# Patient Record
Sex: FEMALE | Race: BLACK OR AFRICAN AMERICAN | NOT HISPANIC OR LATINO | Employment: OTHER | URBAN - METROPOLITAN AREA
[De-identification: names, ages, dates, MRNs, and addresses within clinical notes are randomized per-mention and may not be internally consistent; named-entity substitution may affect disease eponyms.]

---

## 2017-01-03 ENCOUNTER — GENERIC CONVERSION - ENCOUNTER (OUTPATIENT)
Dept: OTHER | Facility: OTHER | Age: 67
End: 2017-01-03

## 2017-01-20 ENCOUNTER — ALLSCRIPTS OFFICE VISIT (OUTPATIENT)
Dept: OTHER | Facility: OTHER | Age: 67
End: 2017-01-20

## 2017-04-14 ENCOUNTER — ALLSCRIPTS OFFICE VISIT (OUTPATIENT)
Dept: OTHER | Facility: OTHER | Age: 67
End: 2017-04-14

## 2017-05-17 ENCOUNTER — ALLSCRIPTS OFFICE VISIT (OUTPATIENT)
Dept: OTHER | Facility: OTHER | Age: 67
End: 2017-05-17

## 2017-05-17 LAB
BILIRUB UR QL STRIP: NORMAL
CLARITY UR: NORMAL
COLOR UR: YELLOW
GLUCOSE (HISTORICAL): NORMAL
HGB UR QL STRIP.AUTO: NORMAL
KETONES UR STRIP-MCNC: NORMAL MG/DL
LEUKOCYTE ESTERASE UR QL STRIP: NORMAL
NITRITE UR QL STRIP: NORMAL
PH UR STRIP.AUTO: 7 [PH]
PROT UR STRIP-MCNC: NORMAL MG/DL
SP GR UR STRIP.AUTO: 1.01
UROBILINOGEN UR QL STRIP.AUTO: 0

## 2017-05-30 ENCOUNTER — ALLSCRIPTS OFFICE VISIT (OUTPATIENT)
Dept: OTHER | Facility: OTHER | Age: 67
End: 2017-05-30

## 2017-05-30 DIAGNOSIS — Z00.00 ENCOUNTER FOR GENERAL ADULT MEDICAL EXAMINATION WITHOUT ABNORMAL FINDINGS: ICD-10-CM

## 2017-05-30 DIAGNOSIS — M41.9 SCOLIOSIS: ICD-10-CM

## 2017-06-06 ENCOUNTER — APPOINTMENT (OUTPATIENT)
Dept: PHYSICAL THERAPY | Facility: CLINIC | Age: 67
End: 2017-06-06
Payer: MEDICARE

## 2017-06-06 ENCOUNTER — GENERIC CONVERSION - ENCOUNTER (OUTPATIENT)
Dept: OTHER | Facility: OTHER | Age: 67
End: 2017-06-06

## 2017-06-06 PROCEDURE — G8982 BODY POS GOAL STATUS: HCPCS

## 2017-06-06 PROCEDURE — 97110 THERAPEUTIC EXERCISES: CPT

## 2017-06-06 PROCEDURE — 97161 PT EVAL LOW COMPLEX 20 MIN: CPT

## 2017-06-06 PROCEDURE — G8981 BODY POS CURRENT STATUS: HCPCS

## 2017-06-09 ENCOUNTER — ALLSCRIPTS OFFICE VISIT (OUTPATIENT)
Dept: OTHER | Facility: OTHER | Age: 67
End: 2017-06-09

## 2017-06-14 ENCOUNTER — GENERIC CONVERSION - ENCOUNTER (OUTPATIENT)
Dept: OTHER | Facility: OTHER | Age: 67
End: 2017-06-14

## 2017-06-15 ENCOUNTER — APPOINTMENT (OUTPATIENT)
Dept: PHYSICAL THERAPY | Facility: CLINIC | Age: 67
End: 2017-06-15
Payer: MEDICARE

## 2017-06-15 PROCEDURE — 97110 THERAPEUTIC EXERCISES: CPT

## 2017-06-19 ENCOUNTER — GENERIC CONVERSION - ENCOUNTER (OUTPATIENT)
Dept: OTHER | Facility: OTHER | Age: 67
End: 2017-06-19

## 2017-06-22 ENCOUNTER — APPOINTMENT (OUTPATIENT)
Dept: PHYSICAL THERAPY | Facility: CLINIC | Age: 67
End: 2017-06-22
Payer: MEDICARE

## 2017-06-22 PROCEDURE — 97110 THERAPEUTIC EXERCISES: CPT

## 2017-06-28 ENCOUNTER — APPOINTMENT (OUTPATIENT)
Dept: PHYSICAL THERAPY | Facility: CLINIC | Age: 67
End: 2017-06-28
Payer: MEDICARE

## 2017-06-28 PROCEDURE — 97110 THERAPEUTIC EXERCISES: CPT

## 2017-06-30 ENCOUNTER — ALLSCRIPTS OFFICE VISIT (OUTPATIENT)
Dept: OTHER | Facility: OTHER | Age: 67
End: 2017-06-30

## 2017-07-06 ENCOUNTER — APPOINTMENT (OUTPATIENT)
Dept: PHYSICAL THERAPY | Facility: CLINIC | Age: 67
End: 2017-07-06
Payer: MEDICARE

## 2017-07-06 PROCEDURE — 97110 THERAPEUTIC EXERCISES: CPT

## 2017-07-06 PROCEDURE — G8983 BODY POS D/C STATUS: HCPCS

## 2017-07-06 PROCEDURE — G8982 BODY POS GOAL STATUS: HCPCS

## 2017-07-10 ENCOUNTER — ALLSCRIPTS OFFICE VISIT (OUTPATIENT)
Dept: OTHER | Facility: OTHER | Age: 67
End: 2017-07-10

## 2017-07-12 ENCOUNTER — GENERIC CONVERSION - ENCOUNTER (OUTPATIENT)
Dept: OTHER | Facility: OTHER | Age: 67
End: 2017-07-12

## 2017-07-13 ENCOUNTER — APPOINTMENT (OUTPATIENT)
Dept: PHYSICAL THERAPY | Facility: CLINIC | Age: 67
End: 2017-07-13
Payer: MEDICARE

## 2017-07-14 ENCOUNTER — TRANSCRIBE ORDERS (OUTPATIENT)
Dept: LAB | Facility: CLINIC | Age: 67
End: 2017-07-14

## 2017-07-14 ENCOUNTER — APPOINTMENT (OUTPATIENT)
Dept: LAB | Facility: CLINIC | Age: 67
End: 2017-07-14
Payer: MEDICARE

## 2017-07-14 DIAGNOSIS — Z00.00 ENCOUNTER FOR GENERAL ADULT MEDICAL EXAMINATION WITHOUT ABNORMAL FINDINGS: ICD-10-CM

## 2017-07-14 LAB
BASOPHILS # BLD AUTO: 0.06 THOUSANDS/ΜL (ref 0–0.1)
BASOPHILS NFR BLD AUTO: 2 % (ref 0–1)
EOSINOPHIL # BLD AUTO: 0 THOUSAND/ΜL (ref 0–0.61)
EOSINOPHIL NFR BLD AUTO: 0 % (ref 0–6)
ERYTHROCYTE [DISTWIDTH] IN BLOOD BY AUTOMATED COUNT: 13.5 % (ref 11.6–15.1)
HCT VFR BLD AUTO: 37.5 % (ref 34.8–46.1)
HGB BLD-MCNC: 12 G/DL (ref 11.5–15.4)
LYMPHOCYTES # BLD AUTO: 1.18 THOUSANDS/ΜL (ref 0.6–4.47)
LYMPHOCYTES NFR BLD AUTO: 29 % (ref 14–44)
MCH RBC QN AUTO: 30 PG (ref 26.8–34.3)
MCHC RBC AUTO-ENTMCNC: 32 G/DL (ref 31.4–37.4)
MCV RBC AUTO: 94 FL (ref 82–98)
MONOCYTES # BLD AUTO: 0.55 THOUSAND/ΜL (ref 0.17–1.22)
MONOCYTES NFR BLD AUTO: 14 % (ref 4–12)
NEUTROPHILS # BLD AUTO: 2.27 THOUSANDS/ΜL (ref 1.85–7.62)
NEUTS SEG NFR BLD AUTO: 55 % (ref 43–75)
NRBC BLD AUTO-RTO: 0 /100 WBCS
PLATELET # BLD AUTO: 217 THOUSANDS/UL (ref 149–390)
PMV BLD AUTO: 11.6 FL (ref 8.9–12.7)
RBC # BLD AUTO: 4 MILLION/UL (ref 3.81–5.12)
WBC # BLD AUTO: 4.06 THOUSAND/UL (ref 4.31–10.16)

## 2017-07-14 PROCEDURE — 36415 COLL VENOUS BLD VENIPUNCTURE: CPT

## 2017-07-14 PROCEDURE — 85025 COMPLETE CBC W/AUTO DIFF WBC: CPT

## 2017-07-19 ENCOUNTER — GENERIC CONVERSION - ENCOUNTER (OUTPATIENT)
Dept: OTHER | Facility: OTHER | Age: 67
End: 2017-07-19

## 2017-07-25 ENCOUNTER — ALLSCRIPTS OFFICE VISIT (OUTPATIENT)
Dept: OTHER | Facility: OTHER | Age: 67
End: 2017-07-25

## 2017-08-09 ENCOUNTER — APPOINTMENT (OUTPATIENT)
Dept: AUDIOLOGY | Facility: CLINIC | Age: 67
End: 2017-08-09
Payer: MEDICARE

## 2017-08-09 PROCEDURE — 92557 COMPREHENSIVE HEARING TEST: CPT | Performed by: AUDIOLOGIST

## 2017-08-09 PROCEDURE — 92567 TYMPANOMETRY: CPT | Performed by: AUDIOLOGIST

## 2017-08-16 DIAGNOSIS — H91.90 HEARING LOSS: ICD-10-CM

## 2017-09-25 ENCOUNTER — TRANSCRIBE ORDERS (OUTPATIENT)
Dept: LAB | Facility: CLINIC | Age: 67
End: 2017-09-25

## 2017-09-25 ENCOUNTER — APPOINTMENT (OUTPATIENT)
Dept: LAB | Facility: CLINIC | Age: 67
End: 2017-09-25
Payer: MEDICARE

## 2017-09-25 DIAGNOSIS — Z00.00 ROUTINE GENERAL MEDICAL EXAMINATION AT A HEALTH CARE FACILITY: Primary | ICD-10-CM

## 2017-09-25 DIAGNOSIS — Z00.00 ROUTINE GENERAL MEDICAL EXAMINATION AT A HEALTH CARE FACILITY: ICD-10-CM

## 2017-09-25 LAB
BASOPHILS # BLD AUTO: 0.02 THOUSANDS/ΜL (ref 0–0.1)
BASOPHILS NFR BLD AUTO: 1 % (ref 0–1)
EOSINOPHIL # BLD AUTO: 0 THOUSAND/ΜL (ref 0–0.61)
EOSINOPHIL NFR BLD AUTO: 0 % (ref 0–6)
ERYTHROCYTE [DISTWIDTH] IN BLOOD BY AUTOMATED COUNT: 13.6 % (ref 11.6–15.1)
HCT VFR BLD AUTO: 37.4 % (ref 34.8–46.1)
HGB BLD-MCNC: 11.8 G/DL (ref 11.5–15.4)
LYMPHOCYTES # BLD AUTO: 1.03 THOUSANDS/ΜL (ref 0.6–4.47)
LYMPHOCYTES NFR BLD AUTO: 33 % (ref 14–44)
MCH RBC QN AUTO: 29.3 PG (ref 26.8–34.3)
MCHC RBC AUTO-ENTMCNC: 31.6 G/DL (ref 31.4–37.4)
MCV RBC AUTO: 93 FL (ref 82–98)
MONOCYTES # BLD AUTO: 0.39 THOUSAND/ΜL (ref 0.17–1.22)
MONOCYTES NFR BLD AUTO: 13 % (ref 4–12)
NEUTROPHILS # BLD AUTO: 1.67 THOUSANDS/ΜL (ref 1.85–7.62)
NEUTS SEG NFR BLD AUTO: 53 % (ref 43–75)
NRBC BLD AUTO-RTO: 0 /100 WBCS
PLATELET # BLD AUTO: 228 THOUSANDS/UL (ref 149–390)
PMV BLD AUTO: 11.6 FL (ref 8.9–12.7)
RBC # BLD AUTO: 4.03 MILLION/UL (ref 3.81–5.12)
WBC # BLD AUTO: 3.11 THOUSAND/UL (ref 4.31–10.16)

## 2017-09-25 PROCEDURE — 85025 COMPLETE CBC W/AUTO DIFF WBC: CPT

## 2017-09-25 PROCEDURE — 36415 COLL VENOUS BLD VENIPUNCTURE: CPT

## 2017-10-03 ENCOUNTER — ALLSCRIPTS OFFICE VISIT (OUTPATIENT)
Dept: OTHER | Facility: OTHER | Age: 67
End: 2017-10-03

## 2017-10-27 NOTE — PROGRESS NOTES
Assessment  1  Callus (700) (L84)   2  Atherosclerosis of arteries of extremities (440 20) (I70 209)   3  Deformity of ankle and foot, acquired (736 70) (M21 969)   4  Pain in both feet (729 5) (M79 671,M79 672)    Plan   · Follow-up visit in 9 weeks Evaluation and Treatment  Follow-up  Status: Hold For -  Scheduling  Requested for: 77IOF1041   · Inspect your feet and legs daily if you have vascular disease ; Status:Complete;   Done:  45HGY7019 02:11PM          Follow-up visit in 3 months Evaluation and Treatment  Follow-up  Status: Hold For - Scheduling  Requested for: 06Ixw2335  Ordered; For: Pain in both feet;  Ordered By: Jewel Rose  Performed:   Due: 30BHI2716     Discussion/Summary  The patient, patient's caretaker was counseled regarding instructions for management,-- risk factor reductions,-- patient and family education,-- importance of compliance with treatment  The treatment plan was reviewed with the patient/guardian  The patient/guardian understands and agrees with the treatment plan      Chief Complaint  Patient has painful Calluses and thick painful nails that hurt when walking and wearing shoes  History of Present Illness  HPI: aids have not noticed any redness or signs of infection      Active Problems  1  Acid reflux (530 81) (K21 9)   2  Aftercare following joint replacement surgery (V54 81) (Z47 1)   3  Arthritis (716 90) (M19 90)   4  Atherosclerosis of arteries of extremities (440 20) (I70 209)   5  BMI 32 0-32 9,adult (V85 32) (Z68 32)   6  Callus (700) (L84)   7  Cataract (366 9) (H26 9)   8  Deformity of ankle and foot, acquired (736 70) (M21 969)   9  Depression screen (V79 0) (Z13 89)   10  Encounter for health maintenance examination with abnormal findings (V70 0) (Z00 01)   11  Flank pain (789 09) (R10 9)   12  Hammer toes of both feet (735 4) (M20 41,M20 42)   13  Hearing loss (389 9) (H91 90)   14  Ingrowing nail (703 0) (L60 0)   15   Lactose intolerance (271 3) (E73 9) 16  Need for Tdap vaccination (V06 1) (Z23)   17  Osteoarthritis, knee/lower leg (715 96) (M17 9)   18  Osteoporosis (733 00) (M81 0)   19  Pain in both feet (729 5) (M79 671,M79 672)   20  Pain in extremity (729 5) (M79 609)   21  Rheumatoid arthritis (714 0) (M06 9)   22  Routine eye exam (V72 0) (Z01 00)   23  Screening for genitourinary condition (V81 6) (Z13 89)   24  Seizure, epileptic (345 90) (G40 909)   25  Severe scoliosis (737 30) (M41 9)   26  Special screening for other neurological conditions (V80 09) (Z13 89)   27  Systemic lupus erythematosus (710 0) (M32 9)    Past Medical History   · _ (795 0)   · Acute upper respiratory infection (465 9) (J06 9)   · History of Cellulitis of toe of right foot (681 10) (L03 031)   · History of Dermatophytosis of nail (110 1) (B35 1)   · History of Fall, accidental (E888 9) (M15 CJXE)   · History of acute gastritis (V12 70) (Z87 19)   · History of allergic rhinitis (V12 69) (Z87 09)   · History of disuse osteoporosis (V13 59) (Z87 39)   · History of hypertension (V12 59) (Z86 79)   · History of ingrown nail (V13 3) (Z87 2)   · History of viral infection (V12 09) (Z86 19)   · History of Maceration of skin (709 8) (L98 8)   · History of Middle ear effusion (381 4) (H65 90)   · History of Paronychia of toe (681 11) (L03 039)   · History of Rib pain on left side (786 50) (R07 81)   · Screening examination for pulmonary tuberculosis (V74 1) (Z11 1)   · Skin Abscess Of Hip (682 6)    The active problems and past medical history were reviewed and updated today  Surgical History   · History of Knee Replacement   · History of Revision Of Total Knee Arthroplasty    The surgical history was reviewed and updated today         Family History  Mother    · Family history of Alzheimer's disease (V17 2) (Z82 0)   · Family history of arthritis (V17 7) (Z82 61)   · Family history of malignant neoplasm of breast (V16 3) (Z80 3)   · FH: mastectomy (V19 8) (Z80 80)  Father    · Family history of diabetes mellitus (V18 0) (Z83 3)    Social History   · Lives with roommate   · Never A Smoker   · No alcohol use  The social history was reviewed and updated today  Current Meds   1  B-6 50 MG Oral Tablet; take 1 tab twice daily, once at 8 am and once at 8pm;   Therapy: 35XFY9101 to (Last Rx:97Qnw5512)  Requested for: 77DRW1980 Ordered   2  Calcitonin ([place]) 200 UNIT/ACT Nasal Solution; ONE SPRAY TO ALTERNATING   NOSTIL @ 8PM  DR ETIENNE; Therapy: 06Vqc6409 to (Evaluate:78Clu8968)  Requested for: 10XMK6563; Last   Rx:09Jun2017 Ordered   3  Calcium 600 MG Oral Tablet; take 1 tab daily at 8am and one tab at 8pm  Requested for:   43Cls3325; Last Rx:11Gsn6517 Ordered   4  Calcium Carbonate 600 MG Oral Tablet; TAKE ONE TABLET @ 8AM AND @ [address]; Therapy: 66HEX3179 to (Evaluate:77Qim6729)  Requested for: 58JEM6753; Last   Rx:65Rsd6117 Ordered   5  Claritin 10 MG Oral Tablet; take 1 tab daily at 8pm;   Therapy: 26AUF9341 to (Evaluate:85Ivr3192)  Requested for: 19ROE9034; Last   WS:08RNP3626 Ordered   6  Clindamycin HCl - 300 MG Oral Capsule; take 2 caps prior to dental procedures; Therapy: 20FKS9238 to (Last Rx:34Hiy9429)  Requested for: 27Udc1797 Ordered   7  Daily Multi Oral Tablet; Take 1 tab daily at 8AM; Last Rx:09Jun2017 Ordered   8  Ferocon Oral Capsule; TAKE ONE CAPSULE @ 8AM DROPPERFULMICAH BABB; Therapy: 30JGZ9625 to (77 769 135)  Requested for: 18HNU9264; Last   Rx:33Hyn1974 Ordered   9  Folic Acid 1 MG Oral Tablet; TAKE ONE TABLET @ 8AM DROPPERFULL  ETIENNE; Therapy: 39NFD5628 to (Evaluate:16Oct2016)  Requested for: 03Pbl6102; Last   Rx:50Bev1168 Ordered   10  Fosamax 70 MG Oral Tablet; take 1 tab by mouth once a week on sunday mornings at    7am; Last Rx:85Ujp2495 Ordered   11  Furosemide 20 MG Oral Tablet; TAKE ONE TABLET @ 8AM DR ETIENNE; Therapy: 52Omg3604 to (Evaluate:40Crr9724)  Requested for: 81ITP2729; Last    AR:90FFQ1022 Ordered   12   Lactaid 3000 UNIT Oral Tablet; Take 2 tabs with 1st bite of dairy as needed  take 1 more    tab if dairy consumption continues 30-45 mins after 1st dosage; Therapy: 48CMR6878 to (Evaluate:84Nhf8500); Last Rx:09Jun2017 Ordered   13  Lactase Enzyme 3000 UNIT Oral Tablet; TAKE 2 CAPLETS WITH 1ST BITE OF DAIRY  TAKE 1 CAPLET IF DAIRY CONSUMPTION CONTINUES 30-45 MINUTES AFTER 1ST    DOSAGE;    Therapy: 43Qet7459 to (Evaluate:20Apr2017)  Requested for: 70OZU9207; Last    Rx:79Zhg6338 Ordered   14  LevETIRAcetam 500 MG Oral Tablet; 1 Two Times A Day; 7 AM & 8 PM;    Therapy: 91KHA2882 to (Evaluate:15Nov2016)  Requested for: 31IEO2811; Last    Rx:49Oxx2763 Ordered   15  Pataday 0 2 % Ophthalmic Solution; Therapy: 32XBI2739 to Recorded   16  PHENobarbital 64 8 MG Oral Tablet; Therapy: (Recorded:62Xfr7981) to Recorded   17  Plaquenil 200 MG Oral Tablet; Therapy: (Recorded:66Ozc7703) to Recorded   18  PredniSONE 5 MG Oral Tablet; TAKE 1 TABLET DAILY; Therapy: (Recorded:35Nvm6117) to Recorded   19  RaNITidine HCl - 150 MG Oral Tablet; TAKE ONE TABLET @ 8AM AND @ 8PM  DR ETIENNE; Therapy: 45MVN2185 to (Evaluate:24Ebc8974)  Requested for: 76ISU6941; Last    NH:96BTG7736 Ordered   20  Tab-A-Mike Oral Tablet; TAKE ONE TABLET @ 8AM  DR Kirsten Navas; Therapy: 11Aug2016 to (Evaluate:30Jan2018)  Requested for: 69Lnb8454; Last    Rx:68Nik8557 Ordered   21  Tylenol 325 MG Oral Tablet; TAKE 1 TO 2 TABLETS EVERY 4 HOURS AS NEEDED; Therapy: 20KUU4550 to (Evaluate:03Jun2017); Last Rx:75Uab3925 Ordered   22  Vitamin B-6 50 MG Oral Tablet; TAKE ONE TABLET @ 8AM AND @ 8PM  DR BABB; Therapy: 46Yuw9937 to (21 )  Requested for: 23Vwt5422; Last    Rx:81Tjv4078 Ordered   23  Vitamin D (Cholecalciferol) 400 UNIT Oral Tablet; TAKE 2 TABLETS @ [facility]; Therapy: 27WKL1655 to (Evaluate:04Jun2018)  Requested for: 35HRL0530; Last    Rx:09Jun2017 Ordered    The medication list was reviewed and updated today  Allergies  1  Erythromycin TABS   2  Penicillins   3  Relafen TABS   4  Erythromycin TABS   5  Penicillins   6  Relafen TABS  7  Dairy   8  No Known Latex Allergies   9  Seasonal    Vitals   Recorded: 14NYM4280 56:55LE   Systolic 972   Diastolic 82   Height 5 ft 3 in   Weight 181 lb    BMI Calculated 32 06   BSA Calculated 1 85     Physical Exam    Constitutional: no acute distress, well appearing and well nourished  Pulmonary: no increased work of breathing or signs of respiratory distress  Cardiovascular: abnormal dorsalis pedis pulse,-- abnormal posterior tibialis pulse,-- elevation pallor,-- dependence rubor,-- abnormal capillary refill-- and-- edema  Orthopedic/Biomechanical: abnormal foot type,-- hammertoe(s),-- bunion(s),-- abnormal MPJ ROM,-- abnormal midtarsal joint ROM,-- abnormal subtalar joint ROM,-- decreased strength with dorsiflexion,-- discolored nails,-- ingrown nails-- and-- subungual debris, but-- normal strength  Skin: keratoses  Left Foot: Appearance: Normal except as noted: a deformity-- and-- pes planus  Great toe deformities include a bunion  Second toe deformities include hammer toe  Third toe deformities include hammer toe  Forth toe deformities include hammer toe  Fifth toe deformities include hammer toe  Evaluation of the great toe nail demonstrates paronychia-- and-- an ingrown nail  Negative erythema negative signs of infection  Hyperkeratotic lesions first digit in first MPJ bilateral large hyperkeratotic lesion distal right third digit  Special Tests: painful hyperkeratotic lesion on the distal aspect of right third digit secondary to contracted hammertoes patient has rigidly contracted hammertoes 2 through 5 bilateral    Right Foot: Appearance: Normal except as noted: a deformity-- and-- pes planus  Great toe deformities include a bunion  Second toe deformities include hammer toe  Third toe deformities include hammer toe  Forth toe deformities include hammer toe   Fifth toe deformities include hammer toe  Evaluation of the great toe nail demonstrates no paronychia-- and-- no ingrown nail  No signs of ingrown erythema no exudate  Pre-ulcerative callus first and fifth met heads rigid hammertoes 2 through 5 bilateral moderate hallux valgus bilateral decreased range of motion first MPJ  Painful hyperkeratotic lesion on the distal aspect of the right third toe  There is blood beneath the skin there is no exudate or signs of infection  Special Tests: mild hyperpigmentation lower legs bilateral mild edema  Neurological Exam: performed  Light touch was decreased bilaterally  Vibratory sensation was decreased in both ankles  Response to monofilament test was 4/6 sites missed bilaterally  Deep tendon reflexes: achilles reflex absent bilateraly  Vascular Exam: Dorsalis pedis pulses were absent bilaterally  Posterior tibial pulses were absent bilaterally  Elevation Pallor: present bilaterally  Dependence rubor was present bilaterally  Capillary refill time was greater than 3 seconds bilaterally  Edema: moderate bilaterally  Toenails: All of the toenails were elongated,-- hypertrophied,-- discolored,-- shown to have subungual debris-- and-- tender  Hyperkeratosis: present on both first toes,-- present on the right third toe,-- present on both first sub metatarsals-- and-- skin hairless atrophic  Shoe Gear Evaluation: performed ()  Right Foot: width: d-- and-- length: 9  Left Foot: width: d-- and-- length: 9  Recommendation(s): athletic shoes-- and-- SAS style  Procedure      Procedure: Debridement of hyperkeratosis  The procedure's risks and infection risk were discussed with the patient and with the guardian  Procedure Note:  Anesthesia:   The area for debridement was located on the First metatarsal head, fifth metatarsal head, bilateral  Debridement was performed on partial thickness  Post-Procedure:   Aseptic debridement of pre-trophic hyperkeratotic lesions ? 4 plantar feet bilateral  Debridement with #10 scalpel debrided nonviable tissue down to healthy viable tissue  No complicationsdebridement and planing of nails x10, manually, and mechanically       Future Appointments    Date/Time Provider Specialty Site   10/24/2017 02:45 PM Evonne Duran DPM Podiat[facility] [address] [facility]   12/19/2017 03:15 PM Evonne Duran DPM Podiatry [address] [facility]     Signatures   Electronically signed by :  Colleen Hou DPM; Oct  3 2017  2:15PM EST                       (Author)

## 2017-11-14 ENCOUNTER — TRANSCRIBE ORDERS (OUTPATIENT)
Dept: ADMINISTRATIVE | Facility: HOSPITAL | Age: 67
End: 2017-11-14

## 2017-11-14 DIAGNOSIS — M85.80 OSTEOPENIA, UNSPECIFIED LOCATION: ICD-10-CM

## 2017-11-14 DIAGNOSIS — Z12.31 VISIT FOR SCREENING MAMMOGRAM: Primary | ICD-10-CM

## 2017-11-15 ENCOUNTER — HOSPITAL ENCOUNTER (OUTPATIENT)
Dept: RADIOLOGY | Facility: CLINIC | Age: 67
Discharge: HOME | End: 2017-11-15
Payer: MEDICARE

## 2017-11-15 DIAGNOSIS — Z12.31 VISIT FOR SCREENING MAMMOGRAM: ICD-10-CM

## 2017-11-15 PROCEDURE — G0202 SCR MAMMO BI INCL CAD: HCPCS

## 2017-12-05 ENCOUNTER — ALLSCRIPTS OFFICE VISIT (OUTPATIENT)
Dept: OTHER | Facility: OTHER | Age: 67
End: 2017-12-05

## 2017-12-05 ENCOUNTER — TRANSCRIBE ORDERS (OUTPATIENT)
Dept: ADMINISTRATIVE | Facility: HOSPITAL | Age: 67
End: 2017-12-05

## 2017-12-05 DIAGNOSIS — R07.2 PRECORDIAL PAIN: Primary | ICD-10-CM

## 2017-12-05 DIAGNOSIS — M32.9 SYSTEMIC LUPUS ERYTHEMATOSUS, UNSPECIFIED SLE TYPE, UNSPECIFIED ORGAN INVOLVEMENT STATUS (HCC): ICD-10-CM

## 2017-12-05 DIAGNOSIS — R06.02 SHORTNESS OF BREATH: ICD-10-CM

## 2017-12-05 DIAGNOSIS — R07.2 PRECORDIAL PAIN: ICD-10-CM

## 2017-12-05 DIAGNOSIS — R94.31 NONSPECIFIC ABNORMAL ELECTROCARDIOGRAM (ECG) (EKG): Primary | ICD-10-CM

## 2017-12-05 DIAGNOSIS — I70.209 ATHEROSCLEROSIS OF NATIVE ARTERY OF EXTREMITY (HCC): ICD-10-CM

## 2017-12-07 NOTE — PROGRESS NOTES
Assessment  1  Systemic lupus erythematosus (710 0) (M32 9)  2  Severe scoliosis (737 30) (M41 9)  3  Abnormal ECG (794 31) (R94 31)  4  Chest pain, precordial (786 51) (R07 2)  5  Exertional shortness of breath (786 05) (R06 02)  6  Atherosclerosis of arteries of extremities (440 20) (I70 209)    Plan     · * NM MYOCARDIAL PERFUSION SPECT (STRESS AND/OR REST); Status:NeedInformation - Financial Authorization; Requested for:[id number];   Perform:[facility]; [phone]; Last Updated By:Regla Nicholas; 12/5/2017 1:46:51 PM;Ordered; For:Abnormal ECG, Chest pain, precordial; Ordered By:Cristobal Morgan;     · (1) LIPID PANEL FASTING W DIRECT LDL REFLEX; Status:Active; Requestedfor:78Aeu0046;   Perform:[facility]; Due:33Oyf4218; Ordered; For:Atherosclerosis of arteries of extremities; Ordered By:Cristobal Morgan;   · (1) LIPOPROTEIN (a); Status:Active; Requested for:42Hds9054;   Perform:[facility]; Due:11Pdp4120; Ordered; For:Atherosclerosis of arteries of extremities; Ordered By:Cristobal Morgan;   · EKG/ECG- POC; Status:Complete;   Done: 67CLL6253  Perform: In Office; [phone]; Last Updated By:Any Del Toro; 12/5/2017 1:13:39 PM;Ordered; For:Atherosclerosis of arteries of extremities; Ordered By:Cristobal Morgan;     · ECHO COMPLETE WITH CONTRAST IF INDICATED; Status:Need Information - FinancialAuthorization; Requested for:[id number];   Perform:[facility]; [phone]; Last Updated By:Regla Nicholas; 12/5/2017 1:46:51 PM;Ordered; For:Chest pain, precordial, Exertional shortness of breath, Systemic lupus erythematosus; Ordered By:Cristobal Morgan;    Discussion/Summary    Exertional chest tightness-intermediate [place] risk factorsnuclear stress test  Unable to walk secondary to severe scoliosislipid panel  shortness of breath-ECG with incomplete right bundle branch block/possible left atrial enlargement? Rule out structural abnormality elevated filling pressures   pulmonary htn from sle?  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  She has been having more shortness of breath with exertion and tightness in chest  Previously she could walk without having to stop  She is a poor historian and caretaker helping with history  Has never passed out before  Some depedent edema in the past  She a neurologist for seizures  [facility]      Review of Systems     Cardiac: chest pain-- and-- palpitations present , but-- No complaints of chest pain, no palpitations, no fainting  Skin: No complaints of nonhealing sores or skin rash  Genitourinary: No complaints of recurrent urinary tract infections, frequent urination at night, difficult urination, blood in urine, kidney stones, loss of bladder control, kidney problems, denies any birth control or hormone replacement, is not post menopausal, not currently pregnant  Psychological: No complaints of feeling depressed, anxiety, panic attacks, or difficulty concentrating  General: No complaints of trouble sleeping, lack of energy, fatigue, appetite changes, weight changes, fever, frequent infections, or night sweats  Respiratory: shortness of breath, but-- as noted in HPI  HEENT: No complaints of serious problems, hearing problems, nose problems, throat problems, or snoring  Gastrointestinal: No complaints of liver problems, nausea, vomiting, heartburn, constipation, bloody stools, diarrhea, problems swallowing, adbominal pain, or rectal bleeding    Hematologic: No complaints of bleeding disorders, anemia, blood clots, or excessive brusing  Neurological: No complaints of numbness, tingling, dizziness, weakness, seizures, headaches, syncope or fainting, AM fatigue, daytime sleepiness, no witnessed apnea episodes  Musculoskeletal: No complaints of arthritis, back pain, or painfull swelling  Active Problems  1  Acid reflux (530 81) (K21 9)  2  Aftercare following joint replacement surgery (V54 81) (Z47 1)  3  Arthritis (716 90) (M19 90)  4  Atherosclerosis of arteries of extremities (440 20) (I70 209)  5  BMI 32 0-32 9,adult (V85 32) (Z68 32)  6  Callus (700) (L84)  7  Cataract (366 9) (H26 9)  8  Deformity of ankle and foot, acquired (736 70) (M21 969)  9  Depression screen (V79 0) (Z13 89)  10  Encounter for health maintenance examination with abnormal findings (V70 0) (Z00 01)  11  Flank pain (789 09) (R10 9)  12  Hammer toes of both feet (735 4) (M20 41,M20 42)  13  Hearing loss (389 9) (H91 90)  14  Ingrowing nail (703 0) (L60 0)  15  Lactose intolerance (271 3) (E73 9)  16  Need for Tdap vaccination (V06 1) (Z23)  17  Osteoarthritis, knee/lower leg (715 96) (M17 9)  18  Osteoporosis (733 00) (M81 0)  19  Pain in both feet (729 5) (M79 671,M79 672)  20  Pain in extremity (729 5) (M79 609)  21  Rheumatoid arthritis (714 0) (M06 9)  22  Routine eye exam (V72 0) (Z01 00)  23  Screening for genitourinary condition (V81 6) (Z13 89)  24  Seizure, epileptic (345 90) (G40 909)  25  Severe scoliosis (737 30) (M41 9)  26  Special screening for other neurological conditions (V80 09) (Z13 89)  27   Systemic lupus erythematosus (710 0) (M32 9)    Past Medical History   · _ (795 0)   · Acute upper respiratory infection (465 9) (J06 9)   · History of Cellulitis of toe of right foot (681 10) (L03 031)   · History of Dermatophytosis of nail (110 1) (B35 1)   · History of Fall, accidental (K599 6) (D24 GFKE)   · History of acute gastritis (V12 70) (Z87 19)   · History of allergic rhinitis (V12 69) (Z87 09)   · History of disuse osteoporosis (V13 59) (Z87 39)   · History of hypertension (V12 59) (Z86 79)   · History of ingrown nail (V13 3) (Z87 2)   · History of viral infection (V12 09) (Z86 19)   · History of Maceration of skin (709 8) (L98 8)   · History of Middle ear effusion (381 4) (H65 90)   · History of Paronychia of toe (681 11) (L03 039)   · History of Rib pain on left side (786 50) (R07 81)   · Screening examination for pulmonary tuberculosis (V74 1) (Z11 1)   · Skin Abscess Of Hip (682 6)    The active problems and past medical history were reviewed and updated today  Surgical History   · History of Knee Replacement   · History of Revision Of Total Knee Arthroplasty    The surgical history was reviewed and updated today  Family History  Mother    · Family history of Alzheimer's disease (V17 2) (Z82 0)   · Family history of arthritis (V17 7) (Z82 61)   · Family history of malignant neoplasm of breast (V16 3) (Z80 3)   · FH: mastectomy (V19 8) (Z80 80)  Father    · Family history of diabetes mellitus (V18 0) (Z83 3)    The family history was reviewed and updated today  Social History     · Lives with roommate   · Never A Smoker   · No alcohol use  The social history was reviewed and updated today  Current Meds  1  B-6 50 MG Oral Tablet; take 1 tab twice daily, once at 8 am and once at 8pm; Therapy: 20ZUH3298 to (Last Rx:29Sep2017)  Requested for: 70AFZ1020 Ordered  2  Calcitonin ([place]) 200 UNIT/ACT Nasal Solution; ONE SPRAY TO ALTERNATING NOSTIL @ 8PM  DR ETIENNE; Therapy: 11Aug2016 to (Evaluate:45Cdu9689)  Requested for: 90LWW9925; Last Rx:09Jun2017 Ordered  3  Calcium 600 MG Oral Tablet; take 1 tab daily at 8am and one tab at 8pm  Requested for: 83Ash8665; Last Rx:87Bex8961 Ordered  4  Calcium Carbonate 600 MG Oral Tablet; TAKE ONE TABLET @ 8AM AND @ 8PM  DR BABB;  Therapy: 26CNX0114 to ([phone])  Requested for: 05UPU7551; Last Rx:05Oct2017 Ordered  5  Claritin 10 MG Oral Tablet; take 1 tab daily at 8pm; Therapy: 64AUQ0892 to (Evaluate:31Srj0341)  Requested for: 91YOJ4190; Last EH:50SVQ6634 Ordered  6  Clindamycin HCl - 300 MG Oral Capsule; take 2 caps prior to dental procedures; Therapy: 74IWD5320 to (Last Rx:25Ywh7985)  Requested for: 76Llq7468 Ordered  7  Daily Multi Oral Tablet; Take 1 tab daily at 8AM; Last Rx:09Jun2017 Ordered  8  Ferocon Oral Capsule; TAKE ONE CAPSULE @ 8AM DROPPERFULL  SKINNY; Therapy: 23WCI0821 to (Brian Search)  Requested for: 65WGC1590; Last Rx:05Oct2017 Ordered  9  Folic Acid 1 MG Oral Tablet; TAKE ONE TABLET @ 8AM DROPPERFUL  ETIENNE; Therapy: 06IUA2522 to (Evaluate:24Uqm1006)  Requested for: 38Lyw4372; Last Rx:85Mpv1777 Ordered  10  Fosamax 70 MG Oral Tablet; take 1 tab by mouth once a week on sunday mornings at  7am; Last Rx:09Jun2017 Ordered  11  Furosemide 20 MG Oral Tablet; TAKE ONE TABLET @ 8AM DR ETIENNE; Therapy: 10Xpl5587 to (Evaluate:83Asf4615)  Requested for: 88YPH6917; Last  HX:35XRS2557 Ordered  12  Lactaid 3000 UNIT Oral Tablet; Take 2 tabs with 1st bite of dairy as needed  take 1 more  tab if dairy consumption continues 30-45 mins after 1st dosage; Therapy: 20XZZ7317 to (Evaluate:08Riw8735); Last Rx:09Jun2017 Ordered  13  Lactase Enzyme 3000 UNIT Oral Tablet; TAKE 2 CAPLETS WITH 1ST BITE OF DAIRY  TAKE 1 CAPLET IF DAIRY CONSUMPTION CONTINUES 30-45 MINUTES AFTER 1ST  DOSAGE;  Therapy: 19Xks7518 to (Evaluate:26Jan2018)  Requested for: 27Nov2017; Last  Rx:27Nov2017 Ordered  14  LevETIRAcetam 500 MG Oral Tablet; 1 Two Times A Day; 7 AM & 8 PM;  Therapy: 42TAG2545 to (Evaluate:78Uob5246)  Requested for: 34LNY8125; Last  Rx:53Voa7893 Ordered  15  Pataday 0 2 % Ophthalmic Solution; Therapy: 18YUR5151 to Recorded  16  PHENobarbital 64 8 MG Oral Tablet; Therapy: (Recorded:94Toi6559) to Recorded  17  Plaquenil 200 MG Oral Tablet; Therapy: (Recorded:78Hmh1379) to Recorded  18   PredniSONE 5 MG Oral Tablet; TAKE 1 TABLET DAILY; Therapy: (Recorded:71Stm5356) to Recorded  19  RaNITidine HCl - 150 MG Oral Tablet; TAKE ONE TABLET @ 8AM AND @ 8PM  DR ETIENNE; Therapy: 22HYB8278 to (Evaluate:05Tsz0291)  Requested for: 09QVQ1963; Last  NE:41PRF9617 Ordered  20  Tab-A-Mike Oral Tablet; TAKE ONE TABLET @ 8AM    [address] [place] [address]; Therapy: 11Aug2016 to (Evaluate:30Jan2018)  Requested for: 24Ich0118; Last  Rx:06Hpf4065 Ordered  21  Tylenol 325 MG Oral Tablet; TAKE 1 TO 2 TABLETS EVERY 4 HOURS AS NEEDED; Therapy: 76VGR5771 to (Evaluate:03Jun2017); Last Rx:19Rjt1005 Ordered  22  Vitamin B-6 50 MG Oral Tablet; TAKE ONE TABLET @ 8AM AND @ 8PM  DR BABB; Therapy: 11Aug2016 to ([phone])  Requested for: 05Oct2017; Last  Rx:05Oct2017 Ordered  23  Vitamin D (Cholecalciferol) 400 UNIT Oral Tablet; TAKE 2 TABLETS @ [facility] 11; Therapy: 73CWL7997 to (Evaluate:04Jun2018)  Requested for: 76NPX8538; Last  Rx:09Jun2017 Ordered    The medication list was reviewed and updated today  Allergies  1  Erythromycin TABS  2  Penicillins  3  Relafen TABS  4  Erythromycin TABS  5  Penicillins  6  Relafen TABS  7  Dairy  8  No Known Latex Allergies  9  Seasonal    Vitals  Signs     Heart Rate: 68, Apical  Systolic: 890, RUE, Sitting  Diastolic: 80, RUE, Sitting  Height: 5 ft 3 in  Weight: 185 lb 4 oz  BMI Calculated: 32 82  BSA Calculated: 1 87  O2 Saturation: 97, RA    Physical Exam   Eyes  Conjunctiva and Sclera examination: Conjunctiva pink, sclera anicteric  Ears, Nose, Mouth, and Throat - Oropharynx: Clear, nares are clear, mucous membranes are moist   Neck  Neck and thyroid: Normal, supple, trachea midline, no thyromegaly  Pulmonary  Respiratory effort: No increased work of breathing or signs of respiratory distress  Auscultation of lungs: Clear to auscultation, no rales, no rhonchi, no wheezing, good air movement  Cardiovascular PMI displaced  -- Regular rate and rhythm positive S1 positive S2 positive S4    Carotid pulses: Normal, 2+ bilaterally  Peripheral vascular exam: Normal pulses throughout, no tenderness, erythema or swelling  Pedal pulses: Normal, 2+ bilaterally  Examination of extremities for edema and/or varicosities: Normal    Abdomen  Abdomen: Non-tender and no distention  Liver and spleen: No hepatomegaly or splenomegaly  Musculoskeletal Gait and station: Abnormal -- Digits and nails: Abnormal -- Inspection/palpation of joints, bones, and muscles: Abnormal -- Severe scoliosis  Skin - Skin and subcutaneous tissue: Abnormal   Neurologic - Cranial nerves: II - XII intact  -- Speech: Normal    Psychiatric - Orientation to person, place, and time: Normal -- Mood and affect: Normal       Results/Data  I personally reviewed the recording/images in the office today  My interpretation follows  Sinus rhythm with premature APC possible left atrial enlargement incomplete right bundle branch block      Future Appointments    Date/Time Provider Specialty Site   12/19/2017 03:15 PM Raydell Babinski, DPM Podiatry [address] [facility]   01/09/2018 01:00 PM JUSTIN Roth   Cardiology [address]       Signatures   Electronically signed by : JUSTIN Ortega ; Dec  6 2017  1:01PM EST                       (Author)

## 2017-12-19 ENCOUNTER — ALLSCRIPTS OFFICE VISIT (OUTPATIENT)
Dept: OTHER | Facility: OTHER | Age: 67
End: 2017-12-19

## 2017-12-20 ENCOUNTER — HOSPITAL ENCOUNTER (OUTPATIENT)
Dept: RADIOLOGY | Facility: HOSPITAL | Age: 67
Discharge: HOME | End: 2017-12-20
Attending: INTERNAL MEDICINE
Payer: MEDICARE

## 2017-12-20 ENCOUNTER — HOSPITAL ENCOUNTER (OUTPATIENT)
Dept: NON INVASIVE DIAGNOSTICS | Facility: HOSPITAL | Age: 67
Discharge: HOME | End: 2017-12-20
Attending: INTERNAL MEDICINE
Payer: MEDICARE

## 2017-12-20 ENCOUNTER — TRANSCRIBE ORDERS (OUTPATIENT)
Dept: ADMINISTRATIVE | Facility: HOSPITAL | Age: 67
End: 2017-12-20

## 2017-12-20 ENCOUNTER — GENERIC CONVERSION - ENCOUNTER (OUTPATIENT)
Dept: CARDIOLOGY CLINIC | Facility: CLINIC | Age: 67
End: 2017-12-20

## 2017-12-20 ENCOUNTER — LAB (OUTPATIENT)
Dept: LAB | Facility: HOSPITAL | Age: 67
End: 2017-12-20
Attending: INTERNAL MEDICINE
Payer: MEDICARE

## 2017-12-20 DIAGNOSIS — I70.90 MONCKEBERG'S MEDIAL SCLEROSIS: ICD-10-CM

## 2017-12-20 DIAGNOSIS — R07.2 PRECORDIAL PAIN: ICD-10-CM

## 2017-12-20 DIAGNOSIS — R94.31 NONSPECIFIC ABNORMAL ELECTROCARDIOGRAM (ECG) (EKG): ICD-10-CM

## 2017-12-20 DIAGNOSIS — I70.90 MONCKEBERG'S MEDIAL SCLEROSIS: Primary | ICD-10-CM

## 2017-12-20 DIAGNOSIS — I70.209 ATHEROSCLEROSIS OF NATIVE ARTERY OF EXTREMITY (HCC): ICD-10-CM

## 2017-12-20 DIAGNOSIS — R06.02 SHORTNESS OF BREATH: ICD-10-CM

## 2017-12-20 DIAGNOSIS — M32.9 SYSTEMIC LUPUS ERYTHEMATOSUS, UNSPECIFIED SLE TYPE, UNSPECIFIED ORGAN INVOLVEMENT STATUS (HCC): ICD-10-CM

## 2017-12-20 LAB
CHOLEST SERPL-MCNC: 194 MG/DL (ref 50–200)
HDLC SERPL-MCNC: 102 MG/DL (ref 40–60)
LDLC SERPL CALC-MCNC: 71 MG/DL (ref 0–100)
TRIGL SERPL-MCNC: 107 MG/DL

## 2017-12-20 PROCEDURE — A9502 TC99M TETROFOSMIN: HCPCS

## 2017-12-20 PROCEDURE — 83695 ASSAY OF LIPOPROTEIN(A): CPT

## 2017-12-20 PROCEDURE — 78452 HT MUSCLE IMAGE SPECT MULT: CPT

## 2017-12-20 PROCEDURE — 80061 LIPID PANEL: CPT

## 2017-12-20 PROCEDURE — 93017 CV STRESS TEST TRACING ONLY: CPT

## 2017-12-20 PROCEDURE — 93306 TTE W/DOPPLER COMPLETE: CPT

## 2017-12-20 RX ORDER — PHENOL 1.4 %
600 AEROSOL, SPRAY (ML) MUCOUS MEMBRANE 2 TIMES DAILY WITH MEALS
COMMUNITY
End: 2018-07-01 | Stop reason: SDUPTHER

## 2017-12-20 RX ORDER — RANITIDINE 150 MG/1
150 TABLET ORAL 2 TIMES DAILY
COMMUNITY
End: 2018-02-09 | Stop reason: SDUPTHER

## 2017-12-20 RX ORDER — FUROSEMIDE 20 MG/1
20 TABLET ORAL 2 TIMES DAILY
COMMUNITY
End: 2018-03-06 | Stop reason: SDUPTHER

## 2017-12-20 RX ORDER — HYDROXYCHLOROQUINE SULFATE 200 MG/1
200 TABLET, FILM COATED ORAL 2 TIMES DAILY WITH MEALS
COMMUNITY

## 2017-12-20 RX ORDER — FOLIC ACID 1 MG/1
TABLET ORAL DAILY
COMMUNITY

## 2017-12-20 RX ORDER — ALENDRONATE SODIUM 70 MG/1
70 TABLET ORAL
COMMUNITY
End: 2021-12-29

## 2017-12-20 RX ORDER — LANOLIN ALCOHOL/MO/W.PET/CERES
50 CREAM (GRAM) TOPICAL DAILY
COMMUNITY
End: 2021-12-29

## 2017-12-20 RX ORDER — PHENOBARBITAL 64.8 MG/1
64.8 TABLET ORAL 2 TIMES DAILY
COMMUNITY

## 2017-12-20 RX ORDER — LORATADINE 10 MG/1
10 TABLET ORAL DAILY
COMMUNITY
End: 2018-04-25 | Stop reason: SDUPTHER

## 2017-12-20 RX ADMIN — REGADENOSON 0.4 MG: 0.08 INJECTION, SOLUTION INTRAVENOUS at 09:44

## 2017-12-20 NOTE — PROGRESS NOTES
Assessment  1  Atherosclerosis of arteries of extremities (440 20) (I70 209)   2  Callus (700) (L84)   3  Pain in both feet (729 5) (M79 671,M79 672)   4  Deformity of ankle and foot, acquired (736 70) (M21 969)    Plan   · Follow-up visit in 9 weeks Evaluation and Treatment  Follow-up  Status: Hold For -Scheduling  Requested for: 72SXQ2907   · Inspect your feet and legs daily if you have vascular disease ; Status:Complete;   Done:11Lqi5450 03:26PM      Follow-up visit in 3 months Evaluation and Treatment  Follow-up  Status: Hold For - Scheduling  Requested for: 82Ihn8546 Ordered; For: Pain in both feet;  Ordered By: Yuan Cook  Performed:   Due: 09WCI3079   Discussion/Summary    Daily foot checks monitor signs of infection  The patient, patient's caretaker was counseled regarding instructions for management,-- risk factor reductions,-- patient and family education,-- importance of compliance with treatment  The treatment plan was reviewed with the patient/guardian  The patient/guardian understands and agrees with the treatment plan      Chief Complaint  Patient has painful Calluses and thick painful nails that hurt when walking and wearing shoes  History of Present Illness  HPI: aids have not noticed any redness or signs of infection      Active Problems  1  Abnormal ECG (794 31) (R94 31)   2  Acid reflux (530 81) (K21 9)   3  Aftercare following joint replacement surgery (V54 81) (Z47 1)   4  Arthritis (716 90) (M19 90)   5  Atherosclerosis of arteries of extremities (440 20) (I70 209)   6  BMI 32 0-32 9,adult (V85 32) (Z68 32)   7  Callus (700) (L84)   8  Cataract (366 9) (H26 9)   9  Chest pain, precordial (786 51) (R07 2)   10  Deformity of ankle and foot, acquired (736 70) (M21 969)   11  Depression screen (V79 0) (Z13 89)   12  Encounter for health maintenance examination with abnormal findings (V70 0) (Z00 01)   13  Encounter for screening colonoscopy (V76 51) (Z12 11)   14   Exertional shortness of breath (786 05) (R06 02)   15  Flank pain (789 09) (R10 9)   16  Hammer toes of both feet (735 4) (M20 41,M20 42)   17  Hearing loss (389 9) (H91 90)   18  Ingrowing nail (703 0) (L60 0)   19  Lactose intolerance (271 3) (E73 9)   20  Need for Tdap vaccination (V06 1) (Z23)   21  Osteoarthritis, knee/lower leg (715 96) (M17 9)   22  Osteoporosis (733 00) (M81 0)   23  Pain in both feet (729 5) (M79 671,M79 672)   24  Pain in extremity (729 5) (M79 609)   25  Rheumatoid arthritis (714 0) (M06 9)   26  Routine eye exam (V72 0) (Z01 00)   27  Screening for genitourinary condition (V81 6) (Z13 89)   28  Seizure, epileptic (345 90) (G40 909)   29  Severe scoliosis (737 30) (M41 9)   30  Special screening for other neurological conditions (V80 09) (Z13 89)   31  Systemic lupus erythematosus (710 0) (M32 9)    Past Medical History   · _ (795 0)   · Acute upper respiratory infection (465 9) (J06 9)   · History of Cellulitis of toe of right foot (681 10) (L03 031)   · History of Dermatophytosis of nail (110 1) (B35 1)   · History of Fall, accidental (E888 9) (L93 CSAD)   · History of acute gastritis (V12 70) (Z87 19)   · History of allergic rhinitis (V12 69) (Z87 09)   · History of cataract (V12 49) (Z86 69)   · History of disuse osteoporosis (V13 59) (Z87 39)   · History of gastroesophageal reflux (GERD) (V12 79) (Z87 19)   · History of hypertension (V12 59) (Z86 79)   · History of ingrown nail (V13 3) (Z87 2)   · History of seizure (V12 49) (K46 217)   · History of viral infection (V12 09) (Z86 19)   · History of Maceration of skin (709 8) (L98 8)   · History of Middle ear effusion (381 4) (H65 90)   · History of Paronychia of toe (681 11) (L03 039)   · History of Rib pain on left side (786 50) (R07 81)   · Screening examination for pulmonary tuberculosis (V74 1) (Z11 1)   · Skin Abscess Of Hip (682 6)    The active problems and past medical history were reviewed and updated today        Surgical History   · History of Knee Replacement   · History of Revision Of Total Knee Arthroplasty    The surgical history was reviewed and updated today  Family History  Mother    · Family history of Alzheimer's disease (V17 2) (Z82 0)   · Family history of arthritis (V17 7) (Z82 61)   · Family history of malignant neoplasm of breast (V16 3) (Z80 3)   · FH: mastectomy (V19 8) (Z80 80)  Father    · Family history of diabetes mellitus (V18 0) (Z83 3)    Social History   · Does not exercise (V69 0) (Z72 3)   · Lives with roommate   · Never A Smoker   · No alcohol use  The social history was reviewed and updated today  Current Meds   1  B-6 50 MG Oral Tablet; take 1 tab twice daily, once at 8 am and once at 8pm; Therapy: 52EQD2222 to (Last Rx:43Gql1481)  Requested for: 82ABV0208 Ordered   2  Calcitonin ([place]) 200 UNIT/ACT Nasal Solution; ONE SPRAY TO ALTERNATING NOSTIL @ 8PM  DR ETIENNE; Therapy: 35Ayv5799 to (Evaluate:07Sep2017)  Requested for: 44KYT7509; Last Rx:09Jun2017 Ordered   3  Calcium 600 MG Oral Tablet; take 1 tab daily at 8am and one tab at 8pm  Requested for: 02Owv2738; Last Rx:74Syt6359 Ordered   4  Calcium Carbonate 600 MG Oral Tablet; TAKE ONE TABLET @ 8AM AND @ 8PM  DR BABB; Therapy: 13NMQ4300 to ([facility])  Requested for: 14HMZ1763; Last Rx:05Oct2017 Ordered   5  Claritin 10 MG Oral Tablet; take 1 tab daily at 8pm; Therapy: 59XZJ5068 to (Evaluate:89Fgo5571)  Requested for: 86WIP3695; Last RO:53CLW8235 Ordered   6  Clindamycin HCl - 300 MG Oral Capsule; take 2 caps prior to dental procedures; Therapy: 04LAK1864 to (Last Rx:46Vue7186)  Requested for: 79Cdg9213 Ordered   7  Daily Multi Oral Tablet; Take 1 tab daily at 8AM; Last Rx:09Jun2017 Ordered   8  Ferocon Oral Capsule; TAKE ONE CAPSULE @ 8AM LYDIA BABB; Therapy: 49ABU9804 to ([facility])  Requested for: 03KIX4786; Last Rx:67Hcs2893 Ordered   9  Folic Acid 1 MG Oral Tablet; TAKE ONE TABLET @ 8AM LYDIA ETIENNE;  Therapy: 80RCS2052 to (Evaluate:80Llk1938)  Requested for: 11Utm3119; Last Rx:06Sxr5101 Ordered   10  Fosamax 70 MG Oral Tablet; take 1 tab by mouth once a week on sunday mornings at  7am; Last Rx:09Jun2017 Ordered   11  Furosemide 20 MG Oral Tablet; TAKE ONE TABLET @ 8AM DR ETIENNE; Therapy: 02Brq3650 to (Evaluate:66Ejp8879)  Requested for: 76JFF8700; Last  MW:53TTL3958 Ordered   12  Lactaid 3000 UNIT Oral Tablet; Take 2 tabs with 1st bite of dairy as needed  take 1 more  tab if dairy consumption continues 30-45 mins after 1st dosage; Therapy: 52KGL3019 to (Evaluate:55Ruq8215); Last Rx:09Jun2017 Ordered   13  Lactase Enzyme 3000 UNIT Oral Tablet; TAKE 2 CAPLETS WITH 1ST BITE OF DAIRY  TAKE 1 CAPLET IF DAIRY CONSUMPTION CONTINUES 30-45 MINUTES AFTER 1ST  DOSAGE;  Therapy: 50Euz7790 to (Evaluate:26Jan2018)  Requested for: 27Nov2017; Last  Rx:27Nov2017 Ordered   14  LevETIRAcetam 500 MG Oral Tablet; 1 Two Times A Day; 7 AM & 8 PM;  Therapy: 86VMY4667 to (Evaluate:71Zva6087)  Requested for: 18CET0260; Last  Rx:47Ezy6163 Ordered   15  Pataday 0 2 % Ophthalmic Solution; Therapy: 57PLL6597 to Recorded   16  PHENobarbital 64 8 MG Oral Tablet; Therapy: (Recorded:84Udu1120) to Recorded   17  Plaquenil 200 MG Oral Tablet; Therapy: (Recorded:34Dra7206) to Recorded   18  PredniSONE 5 MG Oral Tablet; TAKE 1 TABLET DAILY; Therapy: (Recorded:07Ffa1305) to Recorded   19  RaNITidine HCl - 150 MG Oral Tablet; TAKE ONE TABLET @ 8AM AND @ 8PM  DR ETIENNE; Therapy: 37CEF3435 to (Evaluate:89Iqn8158)  Requested for: 07TNN0182; Last  CB:98BHR9440 Ordered   20  Tab-A-Mike Oral Tablet; TAKE ONE TABLET @ 8AM    [address] [place] [address]; Therapy: 11Aug2016 to (Evaluate:30Jan2018)  Requested for: 31Bcl9882; Last  Rx:50Bzy9062 Ordered   21  Tylenol 325 MG Oral Tablet; TAKE 1 TO 2 TABLETS EVERY 4 HOURS AS NEEDED; Therapy: 67UEZ6473 to (Evaluate:03Jun2017); Last Rx:87Cbj3945 Ordered   22  Vitamin B-6 50 MG Oral Tablet; TAKE ONE TABLET @ 8AM AND @ 8PM  DR BABB;   Therapy: 20LRF4421 to (05 06 52 16 25)  Requested for: 55Fio9849; Last  Rx:17Iyw4045 Ordered   23  Vitamin D (Cholecalciferol) 400 UNIT Oral Tablet; TAKE 2 TABLETS @ 8AM  DR [facility]; Therapy: 46HDP0802 to ([phone])  Requested for: 35BSI4340; Last  Rx:06Uaf1420 Ordered    The medication list was reviewed and updated today  Allergies  1  Erythromycin TABS   2  Penicillins   3  Relafen TABS   4  Erythromycin TABS   5  Penicillins   6  Relafen TABS  7  Dairy   8  No Known Latex Allergies   9  Seasonal    Vitals   Recorded: 22LXB7904 38:48VD   Systolic 717   Diastolic 80   Height 5 ft 3 in   Weight 185 lb    BMI Calculated 32 77   BSA Calculated 1 87     Physical Exam   Constitutional: no acute distress, well appearing and well nourished  Pulmonary: no increased work of breathing or signs of respiratory distress  Cardiovascular: abnormal dorsalis pedis pulse,-- abnormal posterior tibialis pulse,-- elevation pallor,-- dependence rubor,-- abnormal capillary refill-- and-- edema  Orthopedic/Biomechanical: abnormal foot type,-- hammertoe(s),-- bunion(s),-- abnormal MPJ ROM,-- abnormal midtarsal joint ROM,-- abnormal subtalar joint ROM,-- decreased strength with dorsiflexion,-- discolored nails,-- ingrown nails-- and-- subungual debris, but-- normal strength  Skin: keratoses  Left Foot: Appearance: Normal except as noted: a deformity-- and-- pes planus  Great toe deformities include a bunion  Second toe deformities include hammer toe  Third toe deformities include hammer toe  Forth toe deformities include hammer toe  Fifth toe deformities include hammer toe  Evaluation of the great toe nail demonstrates paronychia-- and-- an ingrown nail  Negative erythema negative signs of infection  Hyperkeratotic lesions first digit in first MPJ bilateral large hyperkeratotic lesion distal right third digit   Special Tests: painful hyperkeratotic lesion on the distal aspect of right third digit secondary to contracted hammertoes patient has rigidly contracted hammertoes 2 through 5 bilateral    Right Foot: Appearance: Normal except as noted: a deformity-- and-- pes planus  Great toe deformities include a bunion  Second toe deformities include hammer toe  Third toe deformities include hammer toe  Forth toe deformities include hammer toe  Fifth toe deformities include hammer toe  Evaluation of the great toe nail demonstrates no paronychia-- and-- no ingrown nail  No signs of ingrown erythema no exudate  Pre-ulcerative callus first and fifth met heads rigid hammertoes 2 through 5 bilateral moderate hallux valgus bilateral decreased range of motion first MPJ  Painful hyperkeratotic lesion on the distal aspect of the right third toe  There is blood beneath the skin there is no exudate or signs of infection  Special Tests: Moderate xerosis negative erythema mild edema no signs of infection no exudate  Skin is hairless and atrophic  Neurological Exam: performed  Light touch was decreased bilaterally  Vibratory sensation was decreased in both ankles  Response to monofilament test was 4/6 sites missed bilaterally  Deep tendon reflexes: achilles reflex absent bilateraly  Vascular Exam: Dorsalis pedis pulses were absent bilaterally  Posterior tibial pulses were absent bilaterally  Elevation Pallor: present bilaterally  Dependence rubor was present bilaterally  Capillary refill time was greater than 3 seconds bilaterally  Edema: moderate bilaterally  Toenails: All of the toenails were elongated,-- hypertrophied,-- discolored,-- shown to have subungual debris-- and-- tender  Hyperkeratosis: present on both first toes,-- present on the right third toe,-- present on both first sub metatarsals-- and-- skin hairless atrophic  Shoe Gear Evaluation: performed ()  Right Foot: width: d-- and-- length: 9  Left Foot: width: d-- and-- length: 9  Recommendation(s): athletic shoes-- and-- SAS style        Procedure     Procedure: Debridement of hyperkeratosis  The procedure's risks and infection risk were discussed with the patient and with the guardian  Procedure Note: Anesthesia:  The area for debridement was located on the First metatarsal head, fifth metatarsal head, bilateral  Debridement was performed on partial thickness  Post-Procedure:   Aseptic debridement of pre-trophic hyperkeratotic lesions Ã4 plantar feet bilateral  Debridement with #10 scalpel debrided nonviable tissue down to healthy viable tissue  No complicationsAseptic debridement and planing of nails x10, manually, and mechanically      Future Appointments    Date/Time Provider Specialty Site   03/20/2018 03:15 PM Martita Nair DPM Podiatry [address] [place] [address] [place]    01/09/2018 01:00 PM JUSTIN Neil Cha  [address]     Signatures   Electronically signed by :  Esme Marcus DPM; Dec 19 2017  3:27PM EST                       (Author)

## 2017-12-21 LAB
CHEST PAIN STATEMENT: NORMAL
MAX DIASTOLIC BP: 65 MMHG
MAX HEART RATE: 102 BPM
MAX PREDICTED HEART RATE: 153 BPM
MAX. SYSTOLIC BP: 113 MMHG
PROTOCOL NAME: NORMAL
REASON FOR TERMINATION: NORMAL
TARGET HR FORMULA: NORMAL
TEST INDICATION: NORMAL
TIME IN EXERCISE PHASE: NORMAL

## 2017-12-27 LAB — LPA SERPL-SCNC: <10 NMOL/L

## 2018-01-09 ENCOUNTER — ALLSCRIPTS OFFICE VISIT (OUTPATIENT)
Dept: OTHER | Facility: OTHER | Age: 68
End: 2018-01-09

## 2018-01-09 NOTE — MISCELLANEOUS
Message  Got med refill request for Calcitonin - refilled it for 30 days, but Called pt, spoke with her caregiver, Caregiver informed she takes care of the medical mgt for the pt, informed her we need to see pt in office for f/u of chronic conditions before we can refill any further meds  Caregiver expressed understanding and agreement  Plan  Allergic rhinitis    · Calcitonin ([place]) 200 UNIT/ACT Nasal Solution (Miacalcin); ONE SPRAY TO  ALTERNATING NOSTIL @ 8PM  DR Susannah Griffin    Signatures   Electronically signed by : Jania Maki DO; Raj  3 2017  2:27PM EST                       (Author)

## 2018-01-11 NOTE — PROGRESS NOTES
Assessment    1  Allergic rhinitis (477 9) (J30 9)   2  Encounter for preventive health examination (V70 0) (Z00 00)   3  Lactose intolerance (271 3) (E73 9)   4  Systemic lupus erythematosus (710 0) (M32 9)   5  Osteoporosis (733 00) (M81 0)   6  Rheumatoid arthritis (714 0) (M06 9)   7  Seizure, epileptic (345 90) (G40 909)   8  BMI 32 0-32 9,adult (V85 32) (Z68 32)    Plan  Allergic rhinitis    · Calcitonin ([employer]) 200 UNIT/ACT Nasal Solution (Miacalcin); one spray in a  nostril daily, alternate nostrils   · Claritin 10 MG Oral Tablet; take 1 tab daily at 8pm  Atherosclerosis of arteries of extremities    · Furosemide 20 MG Oral Tablet (Lasix); TAKE ONE TABLET BY MOUTH IN THE  MORNING (8AM) -[facility]    · Calcium 600 MG Oral Tablet; take 1 tab daily at 8am and one tab at 8pm   · Ferocon Oral Capsule; TAKE ONE CAPSULE BY MOUTH EVERY DAY (8AM)  -[facility]   · Folic Acid 1 MG Oral Tablet; TAKE ONE TABLET BY MOUTH IN THE MORNING  (8AM) -[facility]   · Ranitidine HCl - 150 MG Oral Tablet (Zantac); TAKE ONE TABLET BY MOUTH  TWO TIMES A DAY (8AM&8PM) -[facility]   · (1) CBC/PLT/DIFF; Status:Active; Requested [id number]:[id number];    · (1) COMPREHENSIVE METABOLIC PANEL; Status:Active; Requested EVM:[id number];    · (1) HEMOGLOBIN A1C; Status:Active; Requested NLW:[id number];    · (1) LIPID PANEL, FASTING; Status:Active; Requested MBW:[id number];   Lactose intolerance    · Lactaid 3000 UNIT Oral Tablet; Take 2 tabs with 1st bite of dairy as needed  take  1 more tab if dairy consumption continues 30-45 mins after 1st dosage  Seizure, epileptic    · LevETIRAcetam 500 MG Oral Tablet (Keppra); 1 Two Times A Day; 7 AM & 8 PM    Discussion/Summary    Advised caretaker to contact [facility] about which Pneumococcal vaccine was administered to to pt in 10/16  Refilled patients medications;  Requisitions for labwork also given  Mammogram/pap/breast exam done already this year by Dr Kathleen Taylor caretaker, colonoscopy not due until pt is 68yrs  Commended pt on complying with diet and exercise  Recommended continued follow up with other specialists as needed, completed group home physical form  Will f/u in 1 year  D/W Dr Melany Dobson  Chief Complaint  well exam also needs refills      History of Present Illness  HPI: 71 yo F with a Hx of SLE and seizure disorder from the [facility], presents for her annual CPE  Caretaker present who states that the pt has no acute complaints today except for needing refills of her medications  No seizures x years and pt is very compliant with medications, diet and exercise  The pt currently follows with Dr Jarett Esparza, her rheumatologist in [place], Dr Lety Fair, her neurologist in [facility], Dr Celi Lino where she already had her mammogram, breast and pap exams done this year, [facility] 6 months, Dr Mike Chacon her opthalmologist, Dr Enrike Keenan for her knee replacement follow up and podiatry  Pt also had colonoscopy with Dr Erasto Bellamy 7 years ago which caretaker said was normal  The pt received a pneumococcal vaccine in October of last year along with her flu, however the caretaker has no documentation of which pneumococcal vaccine was administered  Review of Systems    Constitutional: no fever, not feeling poorly, no chills, not feeling tired and no recent weight loss  Eyes: no eyesight problems and no purulent discharge from the eyes  ENT: no earache, no sore throat, no hearing loss and no nasal discharge  Cardiovascular: no chest pain, no intermittent leg claudication, the heart rate was not fast, no palpitations and no lower extremity edema  Respiratory: no shortness of breath, no cough, no orthopnea, no shortness of breath during exertion and no PND  Gastrointestinal: no abdominal pain, no nausea, no vomiting, no constipation and no diarrhea  Genitourinary: no dysuria, no pelvic pain, no vaginal discharge and no incontinence     Musculoskeletal: arthralgias, but no joint swelling, no limb pain, no myalgias, no joint stiffness and no limb swelling  Integumentary: no rashes  Hematologic/Lymphatic: no swollen glands and no tendency for easy bruising  Over the past 2 weeks, how often have you been bothered by the following problems? 1 ) Little interest or pleasure in doing things? Not at all    2 ) Feeling down, depressed or hopeless? Not at all    3 ) Trouble falling asleep or sleeping too much? Not at all    4 ) Feeling tired or having little energy? Not at all    5 ) Poor appetite or overeating? Not at all    6 ) Feeling bad about yourself, or that you are a failure, or have let yourself or your family down? Not at all    7 ) Trouble concentrating on things, such as reading a newspaper or watching television? Several days  8 ) Moving or speaking so slowly that other people could have noticed, or the opposite, moving or speaking faster than usual? Not at all    9 ) Thoughts that you would be better off dead or of hurting yourself in some way? Not at all  Score 1      Active Problems    1  Allergic rhinitis (477 9) (J30 9)   2  Atherosclerosis of arteries of extremities (440 20) (I70 209)   3  Callus (700) (L84)   4  Deformity of ankle and foot, acquired (736 70) (M21 969)   5  Fall, accidental (E888 9) [facility])   6  Hearing loss (389 9) (H91 90)   7  Lactose intolerance (271 3) (E73 9)   8  Maceration of skin (709 8) (L98 8)   9  Osteoarthritis, knee/lower leg (715 96) (M17 9)   10  Osteoporosis (733 00) (M81 0)   11  Pain in both feet (729 5) (M79 671,M79 672)   12  Pain in extremity (729 5) (M79 609)   13  Paronychia of toe (681 11) (L03 039)   14  Rheumatoid arthritis (714 0) (M06 9)   15  Rib pain on left side (786 50) (R07 81)   16  Seizure, epileptic (345 90) (G40 909)   17  Severe scoliosis (737 30) (M41 9)   18   Systemic lupus erythematosus (710 0) (M32 9)    Past Medical History    · _ (795 0)   · Acute upper respiratory infection (465 9) (J06 9)   · History of Dermatophytosis of nail (110 1) (B35 1)   · History of disuse osteoporosis (V13 59) (Z87 39)   · History of hypertension (V12 59) (Z86 79)   · History of viral infection (V12 09) (Z86 19)   · History of Middle ear effusion (381 4) (H65 90)   · Screening examination for pulmonary tuberculosis (V74 1) (Z11 1)   · Skin Abscess Of Hip (682 6)    Surgical History    · History of Knee Replacement   · History of Revision Of Total Knee Arthroplasty    Family History  Mother    · Family history of Alzheimer's disease (V17 2) (Z82 0)   · FH: mastectomy (V19 8) (Z80 80)  Father    · Family history of diabetes mellitus (V18 0) (Z83 3)    Social History    · Lives with roommate   · Never A Smoker   · No alcohol use    Current Meds   1  B-6 50 MG Oral Tablet; take 1 tab twice daily, once at 8 am and once at 8pm;   Therapy: 03ZAN3408 to (Last Rx:66Kds6806)  Requested for: 56QNN2269 Ordered   2  Calcitonin ([place]) 200 UNIT/ACT Nasal Solution; one spray in a nostril daily, alternate   nostrils; Therapy: 47YEY7977 to (Last Rx:75Oay0582) Ordered   3  Calcium 600 MG Oral Tablet; take 1 tab daily at 8am and one tab at 8pm; Last   Rx:36Kcq9045 Ordered   4  Claritin 10 MG Oral Tablet; take 1 tab daily at 8pm;   Therapy: 94HVQ2737 to (Last Rx:87Mbk2599) Ordered   5  Clindamycin HCl - 300 MG Oral Capsule; take 2 caps prior to dental procedures; Therapy: 13LON9396 to (Last Rx:11Mvv8115) Ordered   6  Daily Multi Oral Tablet; Take 1 tab daily at 8AM; Last Rx:32Vuo1747 Ordered   7  Ferocon Oral Capsule; TAKE ONE CAPSULE BY MOUTH EVERY DAY (8AM) -[facility]; Therapy: 96DIZ9792 to (Last Rx:26Jan2016)  Requested for: 00IUA6960 Ordered   8  Folic Acid 1 MG Oral Tablet; TAKE ONE TABLET BY MOUTH IN THE MORNING (8AM)   -[facility]; Therapy: 52BCD4789 to (Last Rx:26Jan2016)  Requested for: 31GNL3143 Ordered   9  Fosamax 70 MG Oral Tablet; take 1 tab by mouth once a week on sunday mornings at   7am; Last Rx:48Zyr6781 Ordered   10   Furosemide 20 MG Oral Tablet; TAKE ONE TABLET BY MOUTH IN THE MORNING (8AM)    -[facility]; Therapy: 73Vjv1966 to (Last Rx:28Mar2016)  Requested for: 28Mar2016 Ordered   11  Keppra 500 MG Oral Tablet; 1 Two Times A Day; 7 AM & 8 PM;    Therapy: 35BCI2669 to  Requested for: 81HMQ2406 Recorded   12  Lactaid 3000 UNIT Oral Tablet; Take 2 tabs with 1st bite of dairy as needed  take 1 more    tab if dairy consumption continues 30-45 mins after 1st dosage; Therapy: 03GRR6695 to (Last Rx:98Mcp1138) Ordered   13  Pataday 0 2 % Ophthalmic Solution; Therapy: 55QWN0411 to Recorded   14  PHENobarbital 64 8 MG Oral Tablet; Therapy: (Recorded:97Uwo4745) to Recorded   15  Plaquenil 200 MG Oral Tablet; Therapy: (Recorded:87Qvy4639) to Recorded   16  PredniSONE 5 MG Oral Tablet; TAKE 1 TABLET DAILY; Therapy: (Recorded:75Kqe8826) to Recorded   17  Ranitidine HCl - 150 MG Oral Tablet; TAKE ONE TABLET BY MOUTH TWO TIMES A DAY    (8AM&8PM) -[place]; Therapy: 71GMT7055 to (Last Rx:82Psa3704)  Requested for: 33Dwf7728 Ordered   18  Vitamin D 400 UNIT TABS; Take 2 tab daily at 8AM; Last Rx:62Vhv9261 Ordered   19  Vitaplex TABS; one daily at 7 am;    Therapy: 95Cfj7251 to  Requested for: 04TTS4002 Recorded    Allergies    1  Erythromycin TABS   2  Penicillins   3  Relafen TABS   4  Erythromycin TABS   5  Penicillins   6  Relafen TABS    7  Dairy   8  No Known Latex Allergies   9  Seasonal    Vitals   Recorded: 27LZF2207 01:04PM   Temperature 98 7 F, Tympanic   Heart Rate 78   Respiration 18   Systolic 030, RUE, Sitting   Diastolic 78, RUE, Sitting   Height 5 ft 3 in   Weight 183 lb 1 6 oz   BMI Calculated 32 43   BSA Calculated 1 86   O2 Saturation 97   Pain Scale 2     Physical Exam    Constitutional   General appearance: No acute distress, well appearing and well nourished  Head and Face   Head and face: Normal     Palpation of the face and sinuses: No sinus tenderness  Eyes   Conjunctiva and lids: No swelling, erythema or discharge  Pupils and irises: Equal, round, reactive to light  Ophthalmoscopic examination: Normal fundi and optic discs  Ears, Nose, Mouth, and Throat   External inspection of ears and nose: Normal     Otoscopic examination: Tympanic membranes translucent with normal light reflex  Canals patent without erythema  Nasal mucosa, septum, and turbinates: Normal without edema or erythema  Lips, teeth, and gums: Normal, good dentition  Oropharynx: Normal with no erythema, edema, exudate or lesions  Neck   Neck: Supple, symmetric, trachea midline, no masses  Thyroid: Normal, no thyromegaly  Pulmonary   Respiratory effort: No increased work of breathing or signs of respiratory distress  Auscultation of lungs: Clear to auscultation  Cardiovascular   Auscultation of heart: Normal rate and rhythm, normal S1 and S2, no murmurs  Pedal pulses: 2+ bilaterally  Examination of extremities for edema and/or varicosities: Normal     Chest   Chest: Normal     Abdomen   Abdomen: Non-tender, no masses  Liver and spleen: No hepatomegaly or splenomegaly  Examination for hernias: No hernia appreciated  Lymphatic   Palpation of lymph nodes in neck: No lymphadenopathy  Musculoskeletal   Gait and station: Normal     Digits and nails: Normal without clubbing or cyanosis  Joints, bones, and muscles: Normal     Range of motion: Normal     Muscle strength/tone: Normal     Skin   Skin and subcutaneous tissue: Normal without rashes or lesions  Neurologic   Cranial nerves: Cranial nerves II-XII intact  Attending Note  Attending Note: Attending Note: I did not interview and examine the patient, I discussed the case with the Resident and reviewed the Resident's note, I supervised the Resident and I agree with the Resident management plan as it was presented to me  Level of Participation: I was present in clinic, but did not examine the patient   Patient's History: 72 y o  female group home resident here for state mandated yearly exam  Key Parts of the Exam: As per resident's note  Diagnosis and Plan: Epilepsy- stable; cont  present meds and f/u with neurologist   SLE- stable- f/u with rheumatologist as directed  Health maint  - caretakers to find out which pneumococcal vaccine she received in 10/15 elsewhere  I agree with the Resident's note  Future Appointments    Date/Time Provider Specialty Site   07/29/2016 02:15 PM Viola Del Rosario DPM Podiatry [address] [facility]     Signatures   Electronically signed by : JUSTIN Moscoso ; May 21 2016 10:43AM EST                       (Author)    Electronically signed by :  JUSTIN Hampton ; May 21 2016  2:10PM EST                       (Co-author)

## 2018-01-12 VITALS
TEMPERATURE: 97.7 F | RESPIRATION RATE: 16 BRPM | DIASTOLIC BLOOD PRESSURE: 76 MMHG | WEIGHT: 183 LBS | HEIGHT: 63 IN | HEART RATE: 82 BPM | BODY MASS INDEX: 32.43 KG/M2 | SYSTOLIC BLOOD PRESSURE: 112 MMHG

## 2018-01-12 VITALS
SYSTOLIC BLOOD PRESSURE: 112 MMHG | HEIGHT: 63 IN | WEIGHT: 181 LBS | DIASTOLIC BLOOD PRESSURE: 69 MMHG | BODY MASS INDEX: 32.07 KG/M2

## 2018-01-13 VITALS — RESPIRATION RATE: 16 BRPM | BODY MASS INDEX: 32.43 KG/M2 | WEIGHT: 183 LBS | HEIGHT: 63 IN

## 2018-01-13 VITALS
SYSTOLIC BLOOD PRESSURE: 110 MMHG | DIASTOLIC BLOOD PRESSURE: 78 MMHG | BODY MASS INDEX: 32.43 KG/M2 | HEIGHT: 63 IN | WEIGHT: 183 LBS

## 2018-01-13 VITALS
BODY MASS INDEX: 32.07 KG/M2 | DIASTOLIC BLOOD PRESSURE: 84 MMHG | WEIGHT: 181 LBS | HEIGHT: 63 IN | SYSTOLIC BLOOD PRESSURE: 132 MMHG

## 2018-01-13 VITALS
BODY MASS INDEX: 32.07 KG/M2 | SYSTOLIC BLOOD PRESSURE: 130 MMHG | WEIGHT: 181 LBS | HEIGHT: 63 IN | DIASTOLIC BLOOD PRESSURE: 82 MMHG

## 2018-01-13 NOTE — RESULT NOTES
Verified Results  (1) CBC/PLT/DIFF 79MNZ7969 07:51AM Lynsey Quach Order Number: [id number]     Test Name Result Flag Reference   WBC COUNT 4 06 Thousand/uL L 4 31-10 16   RBC COUNT 4 00 Million/uL  3 81-5 12   HEMOGLOBIN 12 0 g/dL  11 5-15 4   HEMATOCRIT 37 5 %  34 8-46  1   MCV 94 fL  82-98   MCH 30 0 pg  26 8-34 3   MCHC 32 0 g/dL  31 4-37 4   RDW 13 5 %  11 6-15 1   MPV 11 6 fL  8 9-12 7   PLATELET COUNT 882 Thousands/uL  149-390   nRBC AUTOMATED 0 /100 WBCs     NEUTROPHILS RELATIVE PERCENT 55 %  43-75   LYMPHOCYTES RELATIVE PERCENT 29 %  14-44   MONOCYTES RELATIVE PERCENT 14 % H 4-12   EOSINOPHILS RELATIVE PERCENT 0 %  0-6   BASOPHILS RELATIVE PERCENT 2 % H 0-1   NEUTROPHILS ABSOLUTE COUNT 2 27 Thousands/? ??L  1 85-7 62   LYMPHOCYTES ABSOLUTE COUNT 1 18 Thousands/? ??L  0 60-4 47   MONOCYTES ABSOLUTE COUNT 0 55 Thousand/? ??L  0 17-1 22   EOSINOPHILS ABSOLUTE COUNT 0 00 Thousand/? ??L  0 00-0 61   BASOPHILS ABSOLUTE COUNT 0 06 Thousands/? ??L  0 00-0 10

## 2018-01-13 NOTE — PROGRESS NOTES
Assessment   Assessed    1  Atherosclerosis of arteries of extremities (440 20) (I70 209)   2  Systemic lupus erythematosus (710 0) (M32 9)    Discussion/Summary   Discussion Summary:    Exertional chest tightness-   negative stress test   reassurance   shortness of breath-ECG with incomplete right bundle branch block/possible left atrial enlargement? Rule out structural abnormality elevated filling pressures  pulmonary htn from sle? 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 smoking   Hx with htn      Review of Systems   Cardiology Female ROS:         Cardiac: chest pain-- and-- palpitations present , but-- No complaints of chest pain, no palpitations, no fainting  Skin: No complaints of nonhealing sores or skin rash  Genitourinary: No complaints of recurrent urinary tract infections, frequent urination at night, difficult urination, blood in urine, kidney stones, loss of bladder control, kidney problems, denies any birth control or hormone replacement, is not post menopausal, not currently pregnant        Psychological: No complaints of feeling depressed, anxiety, panic attacks, or difficulty concentrating  General: No complaints of trouble sleeping, lack of energy, fatigue, appetite changes, weight changes, fever, frequent infections, or night sweats  Respiratory: shortness of breath, but-- as noted in HPI  HEENT: No complaints of serious problems, hearing problems, nose problems, throat problems, or snoring  Gastrointestinal: No complaints of liver problems, nausea, vomiting, heartburn, constipation, bloody stools, diarrhea, problems swallowing, adbominal pain, or rectal bleeding  Hematologic: No complaints of bleeding disorders, anemia, blood clots, or excessive brusing  Neurological: No complaints of numbness, tingling, dizziness, weakness, seizures, headaches, syncope or fainting, AM fatigue, daytime sleepiness, no witnessed apnea episodes  Musculoskeletal: No complaints of arthritis, back pain, or painfull swelling  Active Problems   Problems    1  Abnormal ECG (794 31) (R94 31)   2  Acid reflux (530 81) (K21 9)   3  Aftercare following joint replacement surgery (V54 81) (Z47 1)   4  Arthritis (716 90) (M19 90)   5  Atherosclerosis of arteries of extremities (440 20) (I70 209)   6  BMI 32 0-32 9,adult (V85 32) (Z68 32)   7  Callus (700) (L84)   8  Cataract (366 9) (H26 9)   9  Chest pain, precordial (786 51) (R07 2)   10  Deformity of ankle and foot, acquired (736 70) (M21 969)   11  Depression screen (V79 0) (Z13 89)   12  Encounter for health maintenance examination with abnormal findings (V70 0) (Z00 01)   13  Encounter for screening colonoscopy (V76 51) (Z12 11)   14  Exertional shortness of breath (786 05) (R06 02)   15  Flank pain (789 09) (R10 9)   16  Hammer toes of both feet (735 4) (M20 41,M20 42)   17  Hearing loss (389 9) (H91 90)   18  Ingrowing nail (703 0) (L60 0)   19  Lactose intolerance (271 3) (E73 9)   20  Need for Tdap vaccination (V06 1) (Z23)   21  Osteoarthritis, knee/lower leg (715 96) (M17 9)   22   Osteoporosis (733 00) (M81 0)   23  Pain in both feet (729 5) (M79 671,M79 672)   24  Pain in extremity (729 5) (M79 609)   25  Rheumatoid arthritis (714 0) (M06 9)   26  Routine eye exam (V72 0) (Z01 00)   27  Screening for genitourinary condition (V81 6) (Z13 89)   28  Seizure, epileptic (345 90) (G40 909)   29  Severe scoliosis (737 30) (M41 9)   30  Special screening for other neurological conditions (V80 09) (Z13 89)   31  Systemic lupus erythematosus (710 0) (M32 9)    Past Medical History   Problems    1  _ (795 0)   2  Acute upper respiratory infection (465 9) (J06 9)   3  History of Cellulitis of toe of right foot (681 10) (L03 031)   4  History of Dermatophytosis of nail (110 1) (B35 1)   5  History of Fall, accidental (E888 9) (W19 XXXA)   6  History of acute gastritis (V12 70) (Z87 19)   7  History of allergic rhinitis (V12 69) (Z87 09)   8  History of cataract (V12 49) (Z86 69)   9  History of disuse osteoporosis (V13 59) (Z87 39)   10  History of gastroesophageal reflux (GERD) (V12 79) (Z87 19)   11  History of hypertension (V12 59) (Z86 79)   12  History of ingrown nail (V13 3) (Z87 2)   13  History of seizure (V12 49) (Z87 898)   14  History of viral infection (V12 09) (Z86 19)   15  History of Maceration of skin (709 8) (L98 8)   16  History of Middle ear effusion (381 4) (H65 90)   17  History of Paronychia of toe (681 11) (L03 039)   18  History of Rib pain on left side (786 50) (R07 81)   19  Screening examination for pulmonary tuberculosis (V74 1) (Z11 1)   20  Skin Abscess Of Hip (682 6)  Active Problems And Past Medical History Reviewed: The active problems and past medical history were reviewed and updated today  Surgical History   Problems    1  History of Knee Replacement   2  History of Revision Of Total Knee Arthroplasty  Surgical History Reviewed: The surgical history was reviewed and updated today  Family History   Mother    1   Family history of Alzheimer's disease (V17 2) (Z82 0) 2  Family history of arthritis (V17 7) (Z82 61)   3  Family history of malignant neoplasm of breast (V16 3) (Z80 3)   4  FH: mastectomy (V19 8) (Z84 89)  Father    5  Family history of diabetes mellitus (V18 0) (Z83 3)  Family History Reviewed: The family history was reviewed and updated today  Social History   Problems    · Does not exercise (V69 0) (Z72 3)   · Lives with roommate   · Never A Smoker   · No alcohol use  Social History Reviewed: The social history was reviewed and updated today  Current Meds    1  B-6 50 MG Oral Tablet; take 1 tab twice daily, once at 8 am and once at 8pm;     Therapy: 57XQF6288 to (Last Rx:32Zsy1195)  Requested for: 68CDI1074 Ordered   2  Calcitonin ([place]) 200 UNIT/ACT Nasal Solution; ONE SPRAY TO ALTERNATING     NOSTIL @ 8PM  DR ETIENNE; Therapy: 57Gpy3885 to (Evaluate:01Aze4336)  Requested for: 04Yyc0606; Last     Rx:09Jun2017 Ordered   3  Calcium 600 MG Oral Tablet; take 1 tab daily at 8am and one tab at 8pm  Requested for:     83Xwl4197; Last Rx:55Whg9336 Ordered   4  Calcium Carbonate 600 MG Oral Tablet; TAKE ONE TABLET @ 8AM AND @ 8PM  DR BABB; Therapy: 92ZPD0053 to (0472 94 41 68)  Requested for: 74IXB1548; Last     Rx:05Oct2017 Ordered   5  Claritin 10 MG Oral Tablet; take 1 tab daily at 8pm;     Therapy: 51YEK0296 to (Evaluate:50Izj7586)  Requested for: 61UWF4094; Last     AL:41AZG2863 Ordered   6  Clindamycin HCl - 300 MG Oral Capsule; take 2 caps prior to dental procedures; Therapy: 87RNA6912 to (Last Rx:26Kij3995)  Requested for: 92Mvx1178 Ordered   7  Daily Multi Oral Tablet; Take 1 tab daily at 8AM; Last Rx:09Jun2017 Ordered   8  Ferocon Oral Capsule; TAKE ONE CAPSULE @ 8AM DROPPERFULL  SKINNY; Therapy: 22NFU3928 to (0472 94 41 68)  Requested for: 64PMJ9961; Last     Rx:05Oct2017 Ordered   9  Folic Acid 1 MG Oral Tablet; TAKE ONE TABLET @ 8AM DROPPERFULL  ETIENNE;      Therapy: 83HPP1113 to (Evaluate:89Rzk2310)  Requested for: 36WIN5090; Last     Rx:37Ftb3445 Ordered   10  Fosamax 70 MG Oral Tablet; take 1 tab by mouth once a week on sunday mornings at      7am; Last Rx:09Jun2017 Ordered   11  Furosemide 20 MG Oral Tablet; TAKE ONE TABLET @ 8AM DR ETIENNE; Therapy: 65Sxy3808 to (Evaluate:93Kai7667)  Requested for: 39EBI7875; Last      CX:40AHP3631 Ordered   12  Lactaid 3000 UNIT Oral Tablet; Take 2 tabs with 1st bite of dairy as needed  take 1 more      tab if dairy consumption continues 30-45 mins after 1st dosage; Therapy: 45IYA4480 to (Evaluate:28Zsu2265); Last Rx:09Jun2017 Ordered   13  Lactase Enzyme 3000 UNIT Oral Tablet; TAKE 2 CAPLETS WITH 1ST BITE OF DAIRY  TAKE 1 CAPLET IF DAIRY CONSUMPTION CONTINUES 30-45 MINUTES AFTER 1ST      DOSAGE;      Therapy: 15Jxl5989 to (Evaluate:26Jan2018)  Requested for: 27Nov2017; Last      Rx:27Nov2017 Ordered   14  LevETIRAcetam 500 MG Oral Tablet; 1 Two Times A Day; 7 AM & 8 PM;      Therapy: 07WDZ2897 to (Evaluate:15Nov2016)  Requested for: 50OGQ3960; Last      Rx:95Fxz3767 Ordered   15  Pataday 0 2 % Ophthalmic Solution; Therapy: 24LTU1529 to Recorded   16  PHENobarbital 64 8 MG Oral Tablet; Therapy: (Recorded:17Bru8937) to Recorded   17  Plaquenil 200 MG Oral Tablet; Therapy: (Recorded:53Znv1727) to Recorded   18  PredniSONE 5 MG Oral Tablet; TAKE 1 TABLET DAILY; Therapy: (Recorded:45Twv8886) to Recorded   19  RaNITidine HCl - 150 MG Oral Tablet; TAKE ONE TABLET @ 8AM AND @ 8PM  DR ETIENNE; Therapy: 94KOP8659 to (Evaluate:75Hth2655)  Requested for: 52ULF8298; Last      VF:19CUK9903 Ordered   20  Tab-A-Mike Oral Tablet; TAKE ONE TABLET @ 8AM    [address] [place] [address]; Therapy: 11Aug2016 to (Evaluate:30Jan2018)  Requested for: 11Nrn2504; Last      Rx:30Bgy5677 Ordered   21  Tylenol 325 MG Oral Tablet; TAKE 1 TO 2 TABLETS EVERY 4 HOURS AS NEEDED; Therapy: 42VXA0535 to (Evaluate:03Jun2017); Last Rx:19Zvf6583 Ordered   22   Vitamin B-6 50 MG Oral Tablet; TAKE ONE TABLET @ 8AM AND @ 8PM  DR BABB; Therapy: 11Aug2016 to (60 124 37 75)  Requested for: 05Oct2017; Last      Rx:05Oct2017 Ordered   23  Vitamin D (Cholecalciferol) 400 UNIT Oral Tablet; TAKE 2 TABLETS @ 8AM    [facility]; Therapy: 83CXW3082 to (Evaluate:07Jan2018)  Requested for: 41ZFJ4342; Last      Rx:39Ekl0910 Ordered  Medication List Reviewed: The medication list was reviewed and updated today  Allergies   Medication    1  Erythromycin TABS   2  Penicillins   3  Relafen TABS   4  Erythromycin TABS   5  Penicillins   6  Relafen TABS  Non-Medication    7  Dairy   8  No Known Latex Allergies   9  Seasonal    Vitals   Vital Signs    Recorded: 37GBU9299 12:59PM   Heart Rate 73, Apical   Systolic 899, RUE, Sitting   Diastolic 78, RUE, Sitting   Height 5 ft 3 in   Weight 186 lb 6 oz   BMI Calculated 33 02   BSA Calculated 1 88   O2 Saturation 96, RA     Physical Exam        Eyes      Conjunctiva and Sclera examination: Conjunctiva pink, sclera anicteric  Ears, Nose, Mouth, and Throat - Oropharynx: Clear, nares are clear, mucous membranes are moist       Neck      Neck and thyroid: Normal, supple, trachea midline, no thyromegaly  Pulmonary      Respiratory effort: No increased work of breathing or signs of respiratory distress  Auscultation of lungs: Clear to auscultation, no rales, no rhonchi, no wheezing, good air movement  Cardiovascular PMI displaced  -- Regular rate and rhythm positive S1 positive S2 positive S4  Carotid pulses: Normal, 2+ bilaterally  Peripheral vascular exam: Normal pulses throughout, no tenderness, erythema or swelling  Pedal pulses: Normal, 2+ bilaterally  Examination of extremities for edema and/or varicosities: Normal        Abdomen      Abdomen: Non-tender and no distention  Liver and spleen: No hepatomegaly or splenomegaly         Musculoskeletal Gait and station: Abnormal -- Digits and nails: Abnormal -- Inspection/palpation of joints, bones, and muscles: Abnormal -- Severe scoliosis  Skin - Skin and subcutaneous tissue: Abnormal       Neurologic - Cranial nerves: II - XII intact  -- Speech: Normal        Psychiatric - Orientation to person, place, and time: Normal -- Mood and affect: Normal       Results/Data   Diagnostic Studies Reviewed Cardio: I personally reviewed the recording/images in the office today  My interpretation follows  Stress Test: pharmacological nuclear stress test negative for evidence of any isch  ECG Report: Sinus rhythm with premature APC possible left atrial enlargement incomplete right bundle branch block      Health Management   Routine eye exam   *VB - Eye Exam; every 6 months; Last 02WUL3908; Next Due: 81Ivf6830;  Overdue    Future Appointments      Date/Time Provider Specialty Site   03/20/2018 03:15 PM Radha Pham DPM Podiatry [address] [facility]     Signatures    Electronically signed by : JUSTIN Lomax ; Jan 11 2018  3:40PM EST                       (Author)

## 2018-01-14 VITALS
HEART RATE: 68 BPM | WEIGHT: 181 LBS | TEMPERATURE: 97.4 F | RESPIRATION RATE: 18 BRPM | OXYGEN SATURATION: 94 % | DIASTOLIC BLOOD PRESSURE: 68 MMHG | HEIGHT: 63 IN | SYSTOLIC BLOOD PRESSURE: 110 MMHG | BODY MASS INDEX: 32.07 KG/M2

## 2018-01-14 VITALS
DIASTOLIC BLOOD PRESSURE: 72 MMHG | HEART RATE: 71 BPM | HEIGHT: 63 IN | SYSTOLIC BLOOD PRESSURE: 113 MMHG | WEIGHT: 183.38 LBS | BODY MASS INDEX: 32.49 KG/M2

## 2018-01-17 NOTE — PROGRESS NOTES
Assessment    1  Callus (700) (L84)   2  Deformity of ankle and foot, acquired (736 70) (M21 969)   3  Atherosclerosis of arteries of extremities (440 20) (I70 209)   4  Limb pain (729 5) (M79 609)    Plan    · Follow-up visit in 2 months Evaluation and Treatment  Follow-up  Status: Hold For -  Scheduling  Requested for: 97AXE7410   · Inspect your feet and legs daily if you have vascular disease ; Status:Complete;   Done:  06IYI4465 01:32PM    Discussion/Summary    Discussed permanent removal of ingrown if recurrent  Discuss surgical and conservative options for bunion and hammertoesi f becoming more painful  The treatment plan was reviewed with the patient/guardian  The patient/guardian understands and agrees with the treatment plan      Chief Complaint  Chief complaint of painful calluses and painful nails that hurt when walking and wearing shoes  History of Present Illness  HPI: patient gets recurrent ingrown nails bilateral big toes has not noticed any pus or redness does have some pain when standing and walking  Active Problems    1  Allergic rhinitis (477 9) (J30 9)   2  Atherosclerosis of arteries of extremities (440 20) (I70 209)   3  Callus (700) (L84)   4  Deformity of ankle and foot, acquired (736 70) (M21 969)   5  Fall, [facility] (E888 9) [facility])   6  Hearing loss (389 9) (H91 90)   7  Lactose intolerance (271 3) (E73 9)   8  Limb pain (729 5) (M79 609)   9  Maceration of skin (709 8) (L98 8)   10  Osteoarthritis, knee/lower leg (715 96) (M17 9)   11  Osteoporosis (733 00) (M81 0)   12  Pain in both feet (729 5) (M79 671,M79 672)   13  Paronychia of toe (681 11) (L03 039)   14  Rheumatoid arthritis (714 0) (M06 9)   15  Rib pain on left side (786 50) (R07 81)   16  Seizure, epileptic (345 90) (G40 909)   17  Severe scoliosis (737 30) (M41 9)   18   Systemic lupus erythematosus (710 0) (M32 9)    Past Medical History    · _ (795 0)   · Acute upper respiratory infection (465 9) (J06 9) · History of Dermatophytosis of nail (110 1) (B35 1)   · History of disuse osteoporosis (V13 59) (Z87 39)   · History of hypertension (V12 59) (Z86 79)   · History of viral infection (V12 09) (Z86 19)   · History of Middle ear effusion (381 4) (H65 90)   · Screening examination for pulmonary tuberculosis (V74 1) (Z11 1)   · Skin Abscess Of Hip (682 6)    The active problems and past medical history were reviewed and updated today  Surgical History    · History of Knee Replacement   · History of Revision Of Total Knee Arthroplasty    Family History    · Family history of Alzheimer's disease (V17 2) (Z82 0)   · FH: mastectomy (V19 8) (Z84 89)    · Family history of diabetes mellitus (V18 0) (Z83 3)    Social History    · Lives with roommate   · Never A Smoker   · No alcohol use    Current Meds   1  B-6 50 MG Oral Tablet; take 1 tab twice daily, once at 8 am and once at 8pm;   Therapy: 23MMV1148 to (Last Rx:99Lcv5627)  Requested for: 65NXT0880 Ordered   2  Calcitonin ([place]) 200 UNIT/ACT Nasal Solution; one spray in a nostril daily, alternate   nostrils; Therapy: 23UFI2420 to (Last Rx:74Bev6687) Ordered   3  Calcium 600 MG Oral Tablet; take 1 tab daily at 8am and one tab at 8pm; Last   Rx:39Jwd9591 Ordered   4  Claritin 10 MG Oral Tablet; take 1 tab daily at 8pm;   Therapy: 32DAZ4193 to (Last Rx:07Ykz1631) Ordered   5  Clindamycin HCl - 300 MG Oral Capsule; take 2 caps prior to dental procedures; Therapy: 60WAE3783 to (Last Rx:73Ygz8360) Ordered   6  Daily Multi Oral Tablet; Take 1 tab daily at 8AM; Last Rx:91Aer1708 Ordered   7  Ferocon Oral Capsule; TAKE ONE CAPSULE BY MOUTH EVERY DAY (8AM) -ETIENNE; Therapy: 38QUF3069 to (Last Rx:26Jan2016)  Requested for: 06ANL9563 Ordered   8  Folic Acid 1 MG Oral Tablet; TAKE ONE TABLET BY MOUTH IN THE MORNING (8AM)   -ETIENNE; Therapy: 86ODZ5273 to (Last Rx:26Jan2016)  Requested for: 86KZB4953 Ordered   9   Fosamax 70 MG Oral Tablet; take 1 tab by mouth once a week on sunday mornings at   7am; Last Rx:79Gir0285 Ordered   10  Furosemide 20 MG Oral Tablet; Take one tablet every morning at 8:00 A M;    Therapy: 98WPC0408 to (Last Rx:02Xio4997)  Requested for: 39RXW2770 Ordered   11  Keppra 500 MG Oral Tablet; 1 Two Times A Day; 7 AM & 8 PM;    Therapy: 58VHI5901 to  Requested for: 05RZL4021 Recorded   12  Lactaid 3000 UNIT Oral Tablet; Take 2 tabs with 1st bite of dairy as needed  take 1 more    tab if dairy consumption continues 30-45 mins after 1st dosage; Therapy: 41GLU6484 to (Last Rx:85Jlm3919) Ordered   13  Pataday 0 2 % Ophthalmic Solution; Therapy: 76LWQ1009 to Recorded   14  PHENobarbital 64 8 MG Oral Tablet; Therapy: (Recorded:34Atz2761) to Recorded   15  Plaquenil 200 MG Oral Tablet; Therapy: (Recorded:70Lch5399) to Recorded   16  Ranitidine HCl - 150 MG Oral Tablet; take 1 tab daily at 8am and 1 tab daily at 8pm;    Therapy: 54UJM2006 to (Last Rx:55Flz2328) Ordered   17  Vitamin D 400 UNIT Oral Tablet; Take 2 tab daily at 8AM; Last Rx:35Nnu8638 Ordered   18  Vitaplex TABS; one daily at 7 am;    Therapy: 35Apq7460 to  Requested for: 23HKW2523 Recorded    The medication list was reviewed and updated today  Allergies    1  Erythromycin TABS   2  Penicillins   3  Relafen TABS   4  Erythromycin TABS   5  Penicillins   6  Relafen TABS    Vitals   Recorded: 14HWJ6387 01:13PM   Height 5 ft 3 in   Weight 172 lb    BMI Calculated 30 47   BSA Calculated 1 81     Physical Exam    Constitutional: no acute distress, well appearing and well nourished  Pulmonary: no increased work of breathing or signs of respiratory distress  Cardiovascular: abnormal dorsalis pedis pulse, abnormal posterior tibialis pulse, elevation pallor, dependence rubor, abnormal capillary refill and edema     Orthopedic/Biomechanical: abnormal foot type, hammertoe(s), bunion(s), abnormal MPJ ROM, abnormal midtarsal joint ROM, abnormal subtalar joint ROM, decreased strength with dorsiflexion, discolored nails, ingrown nails and subungual debris, but normal strength  Skin: keratoses  Left Foot: Appearance: Normal except as noted: a deformity and pes planus  Great toe deformities include a bunion  Second toe deformities include hammer toe  Third toe deformities include hammer toe  Forth toe deformities include hammer toe  Fifth toe deformities include hammer toe  Evaluation of the great toe nail demonstrates paronychia and an ingrown nail  Mild ingrown negative erythema negative exudate  Hyperkeratotic lesions first digit in first MPJ bilateral large hyperkeratotic lesion distal right third digit  Special Tests: All nails thick discolored dystrophic, positive subungual debris, positive pain on palpation  Right Foot: Appearance: Normal except as noted: a deformity and pes planus  Great toe deformities include a bunion  Second toe deformities include hammer toe  Third toe deformities include hammer toe  Forth toe deformities include hammer toe  Fifth toe deformities include hammer toe  Evaluation of the great toe nail demonstrates no paronychia and no ingrown nail  Pre-ulcerative callus first and fifth met heads rigid hammertoes 2 through 5 bilateral moderate hallux valgus bilateral decreased range of motion first MPJ  Special Tests: rigid hammertoes 2 through 5 bilateral plantarflexed first metatarsal head bilateral decreased range of motion first MPJ  Neurological Exam: performed  Light touch was decreased bilaterally  Vibratory sensation was decreased in both ankles  Response to monofilament test was 4/6 sites missed bilaterally  Deep tendon reflexes: achilles reflex absent bilateraly  Vascular Exam: Dorsalis pedis pulses were absent bilaterally  Posterior tibial pulses were absent bilaterally  Elevation Pallor: present bilaterally  Dependence rubor was present bilaterally  Capillary refill time was greater than 3 seconds bilaterally  Edema: moderate bilaterally  Toenails:  All of the toenails were elongated, hypertrophied, discolored, shown to have subungual debris and tender  Hyperkeratosis: present on both first toes, present on the right third toe, present on both first sub metatarsals and skin hairless atrophic  Shoe Gear Evaluation: performed ()  Right Foot: width: d and length: 9  Left Foot: width: d and length: 9  Recommendation(s): athletic shoes and SAS style  Procedure      Procedure: Debridement of hyperkeratosis  The procedure's risks and infection risk were discussed with the patient and with the guardian  Procedure Note:  Anesthesia:   The area for debridement was located on the First metatarsal head, fifth metatarsal head, bilateral  Debridement was performed on partial thickness  Post-Procedure:   aseptic debridement tyloma x4  Aseptic debridement and planing of nails x10, manually, and mechanically         Signatures   Electronically signed by :  Tiffany Hartmann DPM; Feb 2 2016  1:33PM EST                       (Author)

## 2018-01-22 VITALS
OXYGEN SATURATION: 96 % | HEART RATE: 73 BPM | DIASTOLIC BLOOD PRESSURE: 78 MMHG | WEIGHT: 186.38 LBS | SYSTOLIC BLOOD PRESSURE: 124 MMHG | HEIGHT: 63 IN | BODY MASS INDEX: 33.02 KG/M2

## 2018-01-23 VITALS
DIASTOLIC BLOOD PRESSURE: 80 MMHG | BODY MASS INDEX: 32.82 KG/M2 | HEART RATE: 68 BPM | WEIGHT: 185.25 LBS | HEIGHT: 63 IN | OXYGEN SATURATION: 97 % | SYSTOLIC BLOOD PRESSURE: 128 MMHG

## 2018-01-23 VITALS
HEIGHT: 63 IN | BODY MASS INDEX: 32.78 KG/M2 | SYSTOLIC BLOOD PRESSURE: 122 MMHG | DIASTOLIC BLOOD PRESSURE: 80 MMHG | WEIGHT: 185 LBS

## 2018-02-02 DIAGNOSIS — Z00.00 WELL ADULT EXAM: Primary | ICD-10-CM

## 2018-02-09 DIAGNOSIS — K21.9 GASTROESOPHAGEAL REFLUX DISEASE WITHOUT ESOPHAGITIS: Primary | ICD-10-CM

## 2018-02-10 RX ORDER — MULTIVITAMIN WITH FOLIC ACID 400 MCG
TABLET ORAL
Qty: 30 TABLET | Refills: 5 | Status: SHIPPED | OUTPATIENT
Start: 2018-02-10 | End: 2018-07-01 | Stop reason: SDUPTHER

## 2018-03-06 DIAGNOSIS — K21.9 GASTROESOPHAGEAL REFLUX DISEASE, ESOPHAGITIS PRESENCE NOT SPECIFIED: Primary | ICD-10-CM

## 2018-03-06 DIAGNOSIS — R60.0 BILATERAL LEG EDEMA: ICD-10-CM

## 2018-03-09 DIAGNOSIS — M81.0 AGE RELATED OSTEOPOROSIS, UNSPECIFIED PATHOLOGICAL FRACTURE PRESENCE: Primary | ICD-10-CM

## 2018-03-09 RX ORDER — PYRIDOXINE HCL (VITAMIN B6) 50 MG
TABLET ORAL
COMMUNITY
Start: 2009-06-24

## 2018-03-09 RX ORDER — ERGOCALCIFEROL (VITAMIN D2) 10 MCG
TABLET ORAL
COMMUNITY
Start: 2017-01-23 | End: 2018-03-09 | Stop reason: SDUPTHER

## 2018-03-09 RX ORDER — CHOLECALCIFEROL (VITAMIN D3) 125 MCG
CAPSULE ORAL
COMMUNITY
Start: 2015-07-31 | End: 2018-03-20 | Stop reason: SDUPTHER

## 2018-03-09 RX ORDER — PHENOL 1.4 %
AEROSOL, SPRAY (ML) MUCOUS MEMBRANE
COMMUNITY
End: 2021-12-29

## 2018-03-09 RX ORDER — LEVETIRACETAM 500 MG/1
500 TABLET ORAL EVERY 12 HOURS SCHEDULED
COMMUNITY
Start: 2018-03-06

## 2018-03-09 RX ORDER — OLOPATADINE HCL 0.2 %
DROPS OPHTHALMIC (EYE)
COMMUNITY
Start: 2018-01-30

## 2018-03-14 RX ORDER — RANITIDINE 150 MG/1
TABLET ORAL
Qty: 60 TABLET | Refills: 5 | Status: SHIPPED | OUTPATIENT
Start: 2018-03-14 | End: 2018-03-29 | Stop reason: SDUPTHER

## 2018-03-20 ENCOUNTER — OFFICE VISIT (OUTPATIENT)
Dept: PODIATRY | Facility: CLINIC | Age: 68
End: 2018-03-20
Payer: MEDICARE

## 2018-03-20 VITALS — WEIGHT: 172 LBS | BODY MASS INDEX: 30.48 KG/M2 | HEIGHT: 63 IN

## 2018-03-20 DIAGNOSIS — R26.2 DIFFICULTY IN WALKING INVOLVING ANKLE AND FOOT JOINT: ICD-10-CM

## 2018-03-20 DIAGNOSIS — I70.209 ATHEROSCLEROSIS OF ARTERIES OF EXTREMITIES (HCC): ICD-10-CM

## 2018-03-20 DIAGNOSIS — M21.961 ACQUIRED DEFORMITY OF RIGHT ANKLE AND FOOT: ICD-10-CM

## 2018-03-20 DIAGNOSIS — M20.42 HAMMER TOES OF BOTH FEET: Primary | ICD-10-CM

## 2018-03-20 DIAGNOSIS — M79.671 PAIN IN BOTH FEET: ICD-10-CM

## 2018-03-20 DIAGNOSIS — M20.41 HAMMER TOES OF BOTH FEET: Primary | ICD-10-CM

## 2018-03-20 DIAGNOSIS — M79.672 PAIN IN BOTH FEET: ICD-10-CM

## 2018-03-20 DIAGNOSIS — E73.9 LACTOSE INTOLERANCE: Primary | ICD-10-CM

## 2018-03-20 DIAGNOSIS — L84 CALLUS: ICD-10-CM

## 2018-03-20 PROCEDURE — 99213 OFFICE O/P EST LOW 20 MIN: CPT | Performed by: PODIATRIST

## 2018-03-20 NOTE — PROGRESS NOTES
Assessment/Plan:    Debrided hyperkeratotic lesions discussed proper shoes  Discussed surgical and conservative options for contracted hammertoes  Daily foot checks monitor for signs of infection  Follow-up 1 month  Diagnoses and all orders for this visit:    Hammer toes of both feet    Acquired deformity of right ankle and foot    Pain in both feet    Difficulty in walking involving ankle and foot joint          Subjective:      Patient ID: Shae Smoker is a 79 y o  female  Patient has been having significant pain when walking in hammertoes  Right worse than left  Rates pain 5/10 pain scale  Patient does get recurrent calluses secondary to the pressure of the hammertoes  There is some blood beneath the skin of the right 3rd digit  Aids have not noticed any redness or pus  Is having pain when walking  The following portions of the patient's history were reviewed and updated as appropriate: allergies, current medications, past family history, past medical history, past social history, past surgical history and problem list     Review of Systems   Constitutional: Negative  HENT: Negative  Eyes: Negative  Respiratory: Negative  Cardiovascular: Negative  Gastrointestinal: Negative  Endocrine: Negative  Genitourinary: Negative  Musculoskeletal: Negative  Skin: Negative  Allergic/Immunologic: Negative  Neurological: Negative  Hematological: Negative  Psychiatric/Behavioral: Negative  Objective:      Ht 5' 3" (1 6 m)   Wt 78 kg (172 lb)   BMI 30 47 kg/m²          Physical Exam       Constitutional: no acute distress, well appearing and well nourished  Pulmonary: no increased work of breathing or signs of respiratory distress  Cardiovascular: abnormal dorsalis pedis pulse,-- abnormal posterior tibialis pulse,-- elevation pallor,-- dependence rubor,-- abnormal capillary refill-- and-- edema    Orthopedic/Biomechanical: abnormal foot type,-- hammertoe(s),-- bunion(s),-- abnormal MPJ ROM,-- abnormal midtarsal joint ROM,-- abnormal subtalar joint ROM,-- decreased strength with dorsiflexion,-- discolored nails,-- ingrown nails-- and-- subungual debris, but-- normal strength  Skin: keratoses  Left Foot: Appearance: Normal except as noted: a deformity-- and-- pes planus  Great toe deformities include a bunion  Second toe deformities include hammer toe  Third toe deformities include hammer toe  Forth toe deformities include hammer toe  Fifth toe deformities include hammer toe  Evaluation of the great toe nail demonstrates paronychia-- and-- an ingrown nail  Negative erythema negative signs of infection  Hyperkeratotic lesions first digit in first MPJ bilateral large hyperkeratotic lesion distal right third digit  Special Tests: painful hyperkeratotic lesion on the distal aspect of right third digit secondary to contracted hammertoes patient has rigidly contracted hammertoes 2 through 5 bilateral    Right Foot: Appearance: Normal except as noted: a deformity-- and-- pes planus  Great toe deformities include a bunion  Second toe deformities include hammer toe  Third toe deformities include hammer toe  Forth toe deformities include hammer toe  Fifth toe deformities include hammer toe  Evaluation of the great toe nail demonstrates no paronychia-- and-- no ingrown nail  No signs of ingrown erythema no exudate  Pre-ulcerative callus first and fifth met heads rigid hammertoes 2 through 5 bilateral moderate hallux valgus bilateral decreased range of motion first MPJ  Painful hyperkeratotic lesion on the distal aspect of the right third toe  There is blood beneath the skin there is no exudate or signs of infection  Special Tests: Moderate xerosis negative erythema mild edema no signs of infection no exudate  Skin is hairless and atrophic  Neurological Exam: performed  Light touch was decreased bilaterally  Vibratory sensation was decreased in both ankles  Response to monofilament test was 4/6 sites missed bilaterally  Deep tendon reflexes: achilles reflex absent bilateraly  Vascular Exam: Dorsalis pedis pulses were absent bilaterally  Posterior tibial pulses were absent bilaterally  Elevation Pallor: present bilaterally  Dependence rubor was present bilaterally  Capillary refill time was greater than 3 seconds bilaterally  Edema: moderate bilaterally  Toenails: All of the toenails were elongated,-- hypertrophied,-- discolored,-- shown to have subungual debris-- and-- tender  Hyperkeratosis: present on both first toes,-- present on the right third toe,-- present on both first sub metatarsals-- and-- skin hairless atrophic  Shoe Gear Evaluation: performed ()  Right Foot: width: d-- and-- length: 9  Left Foot: width: d-- and-- length: 9  Recommendation(s): athletic shoes-- and-- SAS style  Rigidly contracted hammertoes 2 through 5 bilateral   Right 3rd digit distal there is a preulcerative hyperkeratotic lesion with some blood beneath the skin  No signs of infection no exudate    Positive pain on palpation

## 2018-03-27 RX ORDER — ERGOCALCIFEROL (VITAMIN D2) 10 MCG
2 TABLET ORAL EVERY MORNING
Qty: 60 TABLET | Refills: 3 | Status: SHIPPED | OUTPATIENT
Start: 2018-03-27

## 2018-03-27 RX ORDER — CHOLECALCIFEROL (VITAMIN D3) 125 MCG
6000 CAPSULE ORAL SEE ADMIN INSTRUCTIONS
Qty: 60 TABLET | Refills: 3 | Status: SHIPPED | OUTPATIENT
Start: 2018-03-27

## 2018-03-29 DIAGNOSIS — R60.0 LOWER LEG EDEMA: Primary | ICD-10-CM

## 2018-03-29 DIAGNOSIS — K21.9 GASTROESOPHAGEAL REFLUX DISEASE WITHOUT ESOPHAGITIS: ICD-10-CM

## 2018-03-30 RX ORDER — RANITIDINE 150 MG/1
TABLET ORAL
Qty: 60 TABLET | Refills: 3 | Status: SHIPPED | OUTPATIENT
Start: 2018-03-30 | End: 2018-07-24 | Stop reason: SDUPTHER

## 2018-03-30 RX ORDER — FUROSEMIDE 20 MG/1
TABLET ORAL
Qty: 30 TABLET | Refills: 3 | Status: SHIPPED | OUTPATIENT
Start: 2018-03-30 | End: 2021-12-29

## 2018-03-30 RX ORDER — FUROSEMIDE 20 MG/1
TABLET ORAL
Qty: 30 TABLET | Refills: 5 | Status: SHIPPED | OUTPATIENT
Start: 2018-03-30

## 2018-03-30 RX ORDER — RANITIDINE 150 MG/1
150 TABLET ORAL 2 TIMES DAILY
Qty: 60 TABLET | Refills: 3 | Status: SHIPPED | OUTPATIENT
Start: 2018-03-30 | End: 2020-06-18

## 2018-04-03 RX ORDER — FUROSEMIDE 20 MG/1
TABLET ORAL
Qty: 30 TABLET | Refills: 5 | OUTPATIENT
Start: 2018-04-03

## 2018-04-03 RX ORDER — RANITIDINE 150 MG/1
TABLET ORAL
Qty: 60 TABLET | Refills: 0 | OUTPATIENT
Start: 2018-04-03

## 2018-04-06 ENCOUNTER — TRANSCRIBE ORDERS (OUTPATIENT)
Dept: ADMINISTRATIVE | Facility: HOSPITAL | Age: 68
End: 2018-04-06

## 2018-04-06 DIAGNOSIS — Z12.31 VISIT FOR SCREENING MAMMOGRAM: Primary | ICD-10-CM

## 2018-04-06 DIAGNOSIS — N60.01 CYST (SOLITARY) OF BREAST, RIGHT: ICD-10-CM

## 2018-04-13 ENCOUNTER — HOSPITAL ENCOUNTER (OUTPATIENT)
Dept: RADIOLOGY | Facility: HOSPITAL | Age: 68
Discharge: HOME | End: 2018-04-13
Payer: MEDICARE

## 2018-04-13 DIAGNOSIS — Z12.31 VISIT FOR SCREENING MAMMOGRAM: ICD-10-CM

## 2018-04-13 DIAGNOSIS — N60.01 CYST (SOLITARY) OF BREAST, RIGHT: ICD-10-CM

## 2018-04-13 PROCEDURE — 77066 DX MAMMO INCL CAD BI: CPT

## 2018-04-17 ENCOUNTER — OFFICE VISIT (OUTPATIENT)
Dept: PODIATRY | Facility: CLINIC | Age: 68
End: 2018-04-17
Payer: MEDICARE

## 2018-04-17 VITALS — BODY MASS INDEX: 30.48 KG/M2 | WEIGHT: 172 LBS | HEIGHT: 63 IN

## 2018-04-17 DIAGNOSIS — M20.42 HAMMER TOES OF BOTH FEET: ICD-10-CM

## 2018-04-17 DIAGNOSIS — M21.961 ACQUIRED DEFORMITY OF RIGHT ANKLE AND FOOT: ICD-10-CM

## 2018-04-17 DIAGNOSIS — R26.2 DIFFICULTY IN WALKING INVOLVING ANKLE AND FOOT JOINT: ICD-10-CM

## 2018-04-17 DIAGNOSIS — M20.41 HAMMER TOES OF BOTH FEET: ICD-10-CM

## 2018-04-17 DIAGNOSIS — B07.9 WARTS OF FOOT: Primary | ICD-10-CM

## 2018-04-17 DIAGNOSIS — I70.209 ATHEROSCLEROSIS OF ARTERIES OF EXTREMITIES (HCC): ICD-10-CM

## 2018-04-17 PROCEDURE — 99213 OFFICE O/P EST LOW 20 MIN: CPT | Performed by: PODIATRIST

## 2018-04-17 NOTE — PROGRESS NOTES
Assessment/Plan:  Debrided hyperkeratotic lesions  Discussed possible need for acid or Cantharone treatment  Discussed surgical conservative options for contracted hammertoes  Discussed proper shoes discussed orthotics  Follow-up 2 months  Diagnoses and all orders for this visit:    Warts of foot    Hammer toes of both feet    Acquired deformity of right ankle and foot    Difficulty in walking involving ankle and foot joint    Atherosclerosis of arteries of extremities (HCC)          Subjective:      Patient ID: Augie Camacho is a 79 y o  female  Patient is follow-up for painful hammertoes and painful hyperkeratotic lesions plantar feet bilateral pain when walking and standing  Aids have not noticed any redness or infection pain has improved  Patient rates pain 3/10 pain scale  No past medical history on file  Current Outpatient Prescriptions:     alendronate (FOSAMAX) 70 mg tablet, Take 70 mg by mouth every 7 days, Disp: , Rfl:     CALCITONIN, SALMON, NA, into each nostril, Disp: , Rfl:     calcium carbonate (OS-YESICA) 600 MG tablet, Take 600 mg by mouth 2 (two) times a day with meals, Disp: , Rfl:     calcium carbonate (OS-YESICA) 600 MG tablet, Take by mouth, Disp: , Rfl:     ferrous [id number] C-folic acid (FEROCON) capsule, Take by mouth, Disp: , Rfl:     folic acid (FOLVITE) 1 mg tablet, Take by mouth daily, Disp: , Rfl:     furosemide (LASIX) 20 mg tablet, TAKE ONE TABLET @ 8AM  DR SAMANIEGO, Disp: 30 tablet, Rfl: 3    furosemide (LASIX) 20 mg tablet, TAKE 1 TABLET BY MOUTH DAILY @ 8AM  DR SAMANIEGO, Disp: 30 tablet, Rfl: 5    hydroxychloroquine (PLAQUENIL) 200 mg tablet, Take 200 mg by mouth 2 (two) times a day with meals, Disp: , Rfl:     lactase (LACTAID) 3,000 units tablet, Take 2 tablets (6,000 Units total) by mouth see administration instructions Take 2 tabs with first bite of dairy as needed  , Disp: 60 tablet, Rfl: 3    levETIRAcetam (KEPPRA) 500 mg tablet, , Disp: , Rfl:     loratadine (CLARITIN) 10 mg tablet, Take 10 mg by mouth daily, Disp: , Rfl:     Multiple Vitamin (TAB-A-ANNELIESE) TABS, TAKE ONE TABLET @ 8AM  DR ETIENNE, Disp: 30 tablet, Rfl: 5    Multiple Vitamins-Minerals (MULTIVITAL PO), Take by mouth, Disp: , Rfl:     PATADAY 0 2 % opth drops, , Disp: , Rfl:     PHENobarbital 64 8 mg tablet, Take 64 8 mg by mouth 2 (two) times a day, Disp: , Rfl:     pyridoxine (VITAMIN B6) 50 mg tablet, Take 50 mg by mouth daily, Disp: , Rfl:     Pyridoxine HCl (VITAMIN B-6) 50 MG TABS, Take by mouth, Disp: , Rfl:     ranitidine (ZANTAC) 150 mg tablet, Take 1 tablet (150 mg total) by mouth 2 (two) times a day Take at 8 AM and 8 PM, Disp: 60 tablet, Rfl: 3    ranitidine (ZANTAC) 150 mg tablet, TAKE 1 TABLET BY MOUTH DAILY @ 8AM AND @ 8PM  DR CHANEY, Disp: 60 tablet, Rfl: 3    Vitamin D, Cholecalciferol, 400 units TABS, Take 2 tablets (800 Units total) by mouth every morning Take 2 tab daily at 8AM, Disp: 60 tablet, Rfl: 3    No past surgical history on file  Allergies   Allergen Reactions    Erythromycin     Lactose Intolerance (Gi)     Nabumetone     Penicillins        Patient Active Problem List   Diagnosis    Atherosclerosis of arteries of extremities (HCC)    Callus    Deformity of ankle and foot, acquired    Hammer toes of both feet    Pain in both feet    Systemic lupus erythematosus ([facility] [address] )       Review of Systems   Constitutional: Negative  HENT: Negative  Eyes: Negative  Respiratory: Negative  Cardiovascular: Negative  Gastrointestinal: Negative  Endocrine: Negative  Genitourinary: Negative  Musculoskeletal: Negative  Skin: Negative  Allergic/Immunologic: Negative  Neurological: Negative  Hematological: Negative  Psychiatric/Behavioral: Negative            Objective:      Ht 5' 3" (1 6 m)   Wt 78 kg (172 lb)   BMI 30 47 kg/m²          Physical Exam     Constitutional: no acute distress, well appearing and well nourished   Pulmonary: no increased work of breathing or signs of respiratory distress   Cardiovascular: abnormal dorsalis pedis pulse,-- abnormal posterior tibialis pulse,-- elevation pallor,-- dependence rubor,-- abnormal capillary refill-- and-- edema   Orthopedic/Biomechanical: abnormal foot type,-- hammertoe(s),-- bunion(s),-- abnormal MPJ ROM,-- abnormal midtarsal joint ROM,-- abnormal subtalar joint ROM,-- decreased strength with dorsiflexion,-- discolored nails,-- ingrown nails-- and-- subungual debris, but-- normal strength   Skin: keratoses  Left Foot: Appearance: Normal except as noted: a deformity-- and-- pes planus  Great toe deformities include a bunion  Second toe deformities include hammer toe  Third toe deformities include hammer toe  Forth toe deformities include hammer toe  Fifth toe deformities include hammer toe  Evaluation of the great toe nail demonstrates paronychia-- and-- an ingrown nail  Negative erythema negative signs of infection  Hyperkeratotic lesions first digit in first MPJ bilateral large hyperkeratotic lesion distal right third digit  Special Tests: painful hyperkeratotic lesion on the distal aspect of right third digit secondary to contracted hammertoes patient has rigidly contracted hammertoes 2 through 5 bilateral    Right Foot: Appearance: Normal except as noted: a deformity-- and-- pes planus  Great toe deformities include a bunion  Second toe deformities include hammer toe  Third toe deformities include hammer toe  Forth toe deformities include hammer toe  Fifth toe deformities include hammer toe  Evaluation of the great toe nail demonstrates no paronychia-- and-- no ingrown nail  No signs of ingrown erythema no exudate  Pre-ulcerative callus first and fifth met heads rigid hammertoes 2 through 5 bilateral moderate hallux valgus bilateral decreased range of motion first MPJ  Painful hyperkeratotic lesion on the distal aspect of the right third toe   There is blood beneath the skin there is no exudate or signs of infection  Special Tests: Moderate xerosis negative erythema mild edema no signs of infection no exudate  Skin is hairless and atrophic  Neurological Exam: performed  Light touch was decreased bilaterally  Vibratory sensation was decreased in both ankles  Response to monofilament test was 4/6 sites missed bilaterally  Deep tendon reflexes: achilles reflex absent bilateraly  Vascular Exam: Dorsalis pedis pulses were absent bilaterally  Posterior tibial pulses were absent bilaterally  Elevation Pallor: present bilaterally  Dependence rubor was present bilaterally  Capillary refill time was greater than 3 seconds bilaterally  Edema: moderate bilaterally  Toenails: All of the toenails were elongated,-- hypertrophied,-- discolored,-- shown to have subungual debris-- and-- tender  Hyperkeratosis: present on both first toes,-- present on the right third toe,-- present on both first sub metatarsals-- and-- skin hairless atrophic  Shoe Gear Evaluation: performed ()  Right Foot: width: d-- and-- length: 9  Left Foot: width: d-- and-- length: 9  Recommendation(s): athletic shoes-- and-- SAS style       Rigidly contracted hammertoes 2 through 5 bilateral   Painful hyperkeratotic lesion distal right 3rd digit possible wart, hyperkeratotic lesions on the left plantar 4th metatarsal head possible wart  Positive thrombosed capillaries  Positive pain on palpation negative erythema no signs of infection    Pain in rigidly contracted hammertoes

## 2018-04-25 DIAGNOSIS — J30.1 SEASONAL ALLERGIC RHINITIS DUE TO POLLEN: Primary | ICD-10-CM

## 2018-04-26 RX ORDER — LORATADINE 10 MG/1
TABLET ORAL
Qty: 30 TABLET | Refills: 5 | Status: SHIPPED | OUTPATIENT
Start: 2018-04-26

## 2018-05-17 ENCOUNTER — TRANSCRIBE ORDERS (OUTPATIENT)
Dept: LAB | Facility: CLINIC | Age: 68
End: 2018-05-17

## 2018-05-17 ENCOUNTER — APPOINTMENT (OUTPATIENT)
Dept: LAB | Facility: CLINIC | Age: 68
End: 2018-05-17
Payer: MEDICARE

## 2018-05-17 DIAGNOSIS — Z79.899 HIGH RISK MEDICATION USE: ICD-10-CM

## 2018-05-17 DIAGNOSIS — M32.0 DRUG-INDUCED SYSTEMIC LUPUS ERYTHEMATOSUS, UNSPECIFIED ORGAN INVOLVEMENT STATUS (HCC): Primary | ICD-10-CM

## 2018-05-17 DIAGNOSIS — M32.0 DRUG-INDUCED SYSTEMIC LUPUS ERYTHEMATOSUS, UNSPECIFIED ORGAN INVOLVEMENT STATUS (HCC): ICD-10-CM

## 2018-05-17 LAB
ALBUMIN SERPL BCP-MCNC: 4.2 G/DL (ref 3.5–5)
ALP SERPL-CCNC: 78 U/L (ref 46–116)
ALT SERPL W P-5'-P-CCNC: 24 U/L (ref 12–78)
ANION GAP SERPL CALCULATED.3IONS-SCNC: 3 MMOL/L (ref 4–13)
AST SERPL W P-5'-P-CCNC: 33 U/L (ref 5–45)
BACTERIA UR QL AUTO: NORMAL /HPF
BASOPHILS # BLD AUTO: 0.03 THOUSANDS/ΜL (ref 0–0.1)
BASOPHILS NFR BLD AUTO: 1 % (ref 0–1)
BILIRUB DIRECT SERPL-MCNC: 0.1 MG/DL (ref 0–0.2)
BILIRUB SERPL-MCNC: 0.33 MG/DL (ref 0.2–1)
BILIRUB UR QL STRIP: NEGATIVE
BUN SERPL-MCNC: 8 MG/DL (ref 5–25)
C3 SERPL-MCNC: 125 MG/DL (ref 90–180)
C4 SERPL-MCNC: 23 MG/DL (ref 10–40)
CALCIUM SERPL-MCNC: 9 MG/DL (ref 8.3–10.1)
CHLORIDE SERPL-SCNC: 102 MMOL/L (ref 100–108)
CLARITY UR: CLEAR
CO2 SERPL-SCNC: 31 MMOL/L (ref 21–32)
COLOR UR: YELLOW
CREAT SERPL-MCNC: 1 MG/DL (ref 0.6–1.3)
EOSINOPHIL # BLD AUTO: 0 THOUSAND/ΜL (ref 0–0.61)
EOSINOPHIL NFR BLD AUTO: 0 % (ref 0–6)
ERYTHROCYTE [DISTWIDTH] IN BLOOD BY AUTOMATED COUNT: 13.6 % (ref 11.6–15.1)
ERYTHROCYTE [SEDIMENTATION RATE] IN BLOOD: 25 MM/HOUR (ref 0–20)
GFR SERPL CREATININE-BSD FRML MDRD: 67 ML/MIN/1.73SQ M
GLUCOSE P FAST SERPL-MCNC: 86 MG/DL (ref 65–99)
GLUCOSE UR STRIP-MCNC: NEGATIVE MG/DL
HCT VFR BLD AUTO: 39 % (ref 34.8–46.1)
HGB BLD-MCNC: 12.3 G/DL (ref 11.5–15.4)
HGB UR QL STRIP.AUTO: NEGATIVE
HYALINE CASTS #/AREA URNS LPF: NORMAL /LPF
IMM GRANULOCYTES # BLD AUTO: 0.01 THOUSAND/UL (ref 0–0.2)
IMM GRANULOCYTES NFR BLD AUTO: 0 % (ref 0–2)
KETONES UR STRIP-MCNC: NEGATIVE MG/DL
LEUKOCYTE ESTERASE UR QL STRIP: NEGATIVE
LYMPHOCYTES # BLD AUTO: 0.65 THOUSANDS/ΜL (ref 0.6–4.47)
LYMPHOCYTES NFR BLD AUTO: 11 % (ref 14–44)
MCH RBC QN AUTO: 29.9 PG (ref 26.8–34.3)
MCHC RBC AUTO-ENTMCNC: 31.5 G/DL (ref 31.4–37.4)
MCV RBC AUTO: 95 FL (ref 82–98)
MONOCYTES # BLD AUTO: 0.57 THOUSAND/ΜL (ref 0.17–1.22)
MONOCYTES NFR BLD AUTO: 9 % (ref 4–12)
NEUTROPHILS # BLD AUTO: 4.81 THOUSANDS/ΜL (ref 1.85–7.62)
NEUTS SEG NFR BLD AUTO: 79 % (ref 43–75)
NITRITE UR QL STRIP: NEGATIVE
NON-SQ EPI CELLS URNS QL MICRO: NORMAL /HPF
NRBC BLD AUTO-RTO: 0 /100 WBCS
PH UR STRIP.AUTO: 7.5 [PH] (ref 4.5–8)
PLATELET # BLD AUTO: 218 THOUSANDS/UL (ref 149–390)
PMV BLD AUTO: 11.1 FL (ref 8.9–12.7)
POTASSIUM SERPL-SCNC: 4.8 MMOL/L (ref 3.5–5.3)
PROT SERPL-MCNC: 8.1 G/DL (ref 6.4–8.2)
PROT UR STRIP-MCNC: NEGATIVE MG/DL
RBC # BLD AUTO: 4.12 MILLION/UL (ref 3.81–5.12)
RBC #/AREA URNS AUTO: NORMAL /HPF
SODIUM SERPL-SCNC: 136 MMOL/L (ref 136–145)
SP GR UR STRIP.AUTO: 1.01 (ref 1–1.03)
UROBILINOGEN UR QL STRIP.AUTO: 0.2 E.U./DL
WBC # BLD AUTO: 6.07 THOUSAND/UL (ref 4.31–10.16)
WBC #/AREA URNS AUTO: NORMAL /HPF

## 2018-05-17 PROCEDURE — 36415 COLL VENOUS BLD VENIPUNCTURE: CPT | Performed by: INTERNAL MEDICINE

## 2018-05-17 PROCEDURE — 81001 URINALYSIS AUTO W/SCOPE: CPT | Performed by: INTERNAL MEDICINE

## 2018-05-17 PROCEDURE — 82248 BILIRUBIN DIRECT: CPT

## 2018-05-17 PROCEDURE — 86225 DNA ANTIBODY NATIVE: CPT

## 2018-05-17 PROCEDURE — 85025 COMPLETE CBC W/AUTO DIFF WBC: CPT | Performed by: INTERNAL MEDICINE

## 2018-05-17 PROCEDURE — 85652 RBC SED RATE AUTOMATED: CPT | Performed by: INTERNAL MEDICINE

## 2018-05-17 PROCEDURE — 86160 COMPLEMENT ANTIGEN: CPT

## 2018-05-17 PROCEDURE — 80053 COMPREHEN METABOLIC PANEL: CPT | Performed by: INTERNAL MEDICINE

## 2018-05-18 LAB — DSDNA AB SER-ACNC: 1 IU/ML (ref 0–9)

## 2018-06-19 ENCOUNTER — OFFICE VISIT (OUTPATIENT)
Dept: PODIATRY | Facility: CLINIC | Age: 68
End: 2018-06-19
Payer: MEDICARE

## 2018-06-19 VITALS
WEIGHT: 172 LBS | RESPIRATION RATE: 17 BRPM | SYSTOLIC BLOOD PRESSURE: 126 MMHG | HEART RATE: 80 BPM | DIASTOLIC BLOOD PRESSURE: 76 MMHG | BODY MASS INDEX: 30.48 KG/M2 | HEIGHT: 63 IN

## 2018-06-19 DIAGNOSIS — M79.672 PAIN IN BOTH FEET: ICD-10-CM

## 2018-06-19 DIAGNOSIS — I70.209 ATHEROSCLEROSIS OF ARTERIES OF EXTREMITIES (HCC): ICD-10-CM

## 2018-06-19 DIAGNOSIS — L84 CALLUS: Primary | ICD-10-CM

## 2018-06-19 DIAGNOSIS — M79.671 PAIN IN BOTH FEET: ICD-10-CM

## 2018-06-19 DIAGNOSIS — M21.961 ACQUIRED DEFORMITY OF RIGHT ANKLE AND FOOT: ICD-10-CM

## 2018-06-19 PROCEDURE — 99213 OFFICE O/P EST LOW 20 MIN: CPT | Performed by: PODIATRIST

## 2018-06-19 NOTE — PROGRESS NOTES
Assessment/Plan:  Debrided hyperkeratotic lesions  Aseptic debridement and planning of nails x10 and manually and mechanically  Discussed proper shoes discussed orthotics  May need acid treatment if not resolving  Follow-up 2 months     Diagnoses and all orders for this visit:    Callus    Pain in both feet    Atherosclerosis of arteries of extremities (HCC)    Acquired deformity of right ankle and foot          Subjective:      Patient ID: Cara Baltazar is a 79 y o  female  Patient is follow-up for painful hyperkeratotic lesions bilateral feet  Possible wart left plantar 4th metatarsal head  Painful hyperkeratotic lesion with contracted painful hammertoe of right 3rd digit  Pain when walking and standing  Aids have not noticed any redness or signs of infection  No past medical history on file  Current Outpatient Prescriptions:     alendronate (FOSAMAX) 70 mg tablet, Take 70 mg by mouth every 7 days, Disp: , Rfl:     CALCITONIN, SALMON, NA, into each nostril, Disp: , Rfl:     calcium carbonate (OS-YESICA) 600 MG tablet, Take 600 mg by mouth 2 (two) times a day with meals, Disp: , Rfl:     calcium carbonate (OS-YESICA) 600 MG tablet, Take by mouth, Disp: , Rfl:     ferrous [id number] C-folic acid (FEROCON) capsule, Take by mouth, Disp: , Rfl:     folic acid (FOLVITE) 1 mg tablet, Take by mouth daily, Disp: , Rfl:     furosemide (LASIX) 20 mg tablet, TAKE ONE TABLET @ 8AM  DR SAMANIEGO, Disp: 30 tablet, Rfl: 3    furosemide (LASIX) 20 mg tablet, TAKE 1 TABLET BY MOUTH DAILY @ 8AM  DR SAMANIEGO, Disp: 30 tablet, Rfl: 5    hydroxychloroquine (PLAQUENIL) 200 mg tablet, Take 200 mg by mouth 2 (two) times a day with meals, Disp: , Rfl:     lactase (LACTAID) 3,000 units tablet, Take 2 tablets (6,000 Units total) by mouth see administration instructions Take 2 tabs with first bite of dairy as needed  , Disp: 60 tablet, Rfl: 3    levETIRAcetam (KEPPRA) 500 mg tablet, , Disp: , Rfl:    loratadine (CLARITIN) 10 mg tablet, TAKE 1 TABLET BY MOUTH @ 8PM  DR CHANEY, Disp: 30 tablet, Rfl: 5    Multiple Vitamin (TAB-A-ANNELIESE) TABS, TAKE ONE TABLET @ 8AM  DR ETIENNE, Disp: 30 tablet, Rfl: 5    Multiple Vitamins-Minerals (MULTIVITAL PO), Take by mouth, Disp: , Rfl:     PATADAY 0 2 % opth drops, , Disp: , Rfl:     PHENobarbital 64 8 mg tablet, Take 64 8 mg by mouth 2 (two) times a day, Disp: , Rfl:     pyridoxine (VITAMIN B6) 50 mg tablet, Take 50 mg by mouth daily, Disp: , Rfl:     Pyridoxine HCl (VITAMIN B-6) 50 MG TABS, Take by mouth, Disp: , Rfl:     ranitidine (ZANTAC) 150 mg tablet, Take 1 tablet (150 mg total) by mouth 2 (two) times a day Take at 8 AM and 8 PM, Disp: 60 tablet, Rfl: 3    ranitidine (ZANTAC) 150 mg tablet, TAKE 1 TABLET BY MOUTH DAILY @ 8AM AND @ 8PM  DR CHANEY, Disp: 60 tablet, Rfl: 3    Vitamin D, Cholecalciferol, 400 units TABS, Take 2 tablets (800 Units total) by mouth every morning Take 2 tab daily at 8AM, Disp: 60 tablet, Rfl: 3    No past surgical history on file  Allergies   Allergen Reactions    Erythromycin     Lactose Intolerance (Gi)     Nabumetone     Penicillins        Patient Active Problem List   Diagnosis    Atherosclerosis of arteries of extremities (HCC)    Callus    Deformity of ankle and foot, acquired    Hammer toes of both feet    Pain in both feet    Systemic lupus erythematosus ([facility] [address] )       Review of Systems   Constitutional: Negative  HENT: Negative  Eyes: Negative  Respiratory: Negative  Cardiovascular: Negative  Gastrointestinal: Negative  Endocrine: Negative  Genitourinary: Negative  Musculoskeletal: Negative  Skin: Negative  Allergic/Immunologic: Negative  Neurological: Negative  Hematological: Negative  Psychiatric/Behavioral: Negative            Objective:      /76   Pulse 80   Resp 17   Ht 5' 3" (1 6 m)   Wt 78 kg (172 lb)   BMI 30 47 kg/m²          Physical Exam     Constitutional: no acute distress, well appearing and well nourished   Pulmonary: no increased work of breathing or signs of respiratory distress   Cardiovascular: abnormal dorsalis pedis pulse,-- abnormal posterior tibialis pulse,-- elevation pallor,-- dependence rubor,-- abnormal capillary refill-- and-- edema   Orthopedic/Biomechanical: abnormal foot type,-- hammertoe(s),-- bunion(s),-- abnormal MPJ ROM,-- abnormal midtarsal joint ROM,-- abnormal subtalar joint ROM,-- decreased strength with dorsiflexion,-- discolored nails,-- ingrown nails-- and-- subungual debris, but-- normal strength   Skin: keratoses  Left Foot: Appearance: Normal except as noted: a deformity-- and-- pes planus  Great toe deformities include a bunion  Second toe deformities include hammer toe  Third toe deformities include hammer toe  Forth toe deformities include hammer toe  Fifth toe deformities include hammer toe  Evaluation of the great toe nail demonstrates paronychia-- and-- an ingrown nail  Negative erythema negative signs of infection  Hyperkeratotic lesions first digit in first MPJ bilateral large hyperkeratotic lesion distal right third digit  Special Tests: painful hyperkeratotic lesion on the distal aspect of right third digit secondary to contracted hammertoes patient has rigidly contracted hammertoes 2 through 5 bilateral    Right Foot: Appearance: Normal except as noted: a deformity-- and-- pes planus  Great toe deformities include a bunion  Second toe deformities include hammer toe  Third toe deformities include hammer toe  Forth toe deformities include hammer toe  Fifth toe deformities include hammer toe  Evaluation of the great toe nail demonstrates no paronychia-- and-- no ingrown nail  No signs of ingrown erythema no exudate  Pre-ulcerative callus first and fifth met heads rigid hammertoes 2 through 5 bilateral moderate hallux valgus bilateral decreased range of motion first MPJ   Painful hyperkeratotic lesion on the distal aspect of the right third toe  There is blood beneath the skin there is no exudate or signs of infection  Special Tests: Moderate xerosis negative erythema mild edema no signs of infection no exudate  Skin is hairless and atrophic  Neurological Exam: performed  Light touch was decreased bilaterally  Vibratory sensation was decreased in both ankles  Response to monofilament test was 4/6 sites missed bilaterally  Deep tendon reflexes: achilles reflex absent bilateraly  Vascular Exam: Dorsalis pedis pulses were absent bilaterally  Posterior tibial pulses were absent bilaterally  Elevation Pallor: present bilaterally  Dependence rubor was present bilaterally  Capillary refill time was greater than 3 seconds bilaterally  Edema: moderate bilaterally  Toenails: All of the toenails were elongated,-- hypertrophied,-- discolored,-- shown to have subungual debris-- and-- tender  Hyperkeratosis: present on both first toes,-- present on the right third toe,-- present on both first sub metatarsals-- and-- skin hairless atrophic  Shoe Gear Evaluation: performed ()  Right Foot: width: d-- and-- length: 9  Left Foot: width: d-- and-- length: 9  Recommendation(s): athletic shoes-- and-- SAS style       Rigidly contracted hammertoes 2 through 5 bilateral   Painful hyperkeratotic lesion distal right 3rd digit possible wart, hyperkeratotic lesions on the left plantar 4th metatarsal head possible wart  Positive thrombosed capillaries  Positive pain on palpation negative erythema no signs of infection  There is some blood beneath the skin of the right 3rd digit  No open wound or signs of infection  No redness or drainage  Moderate swelling bilateral legs  Moderate venous stasis

## 2018-06-21 DIAGNOSIS — Z00.00 WELL ADULT EXAM: ICD-10-CM

## 2018-06-27 RX ORDER — LANOLIN ALCOHOL/MO/W.PET/CERES
50 CREAM (GRAM) TOPICAL DAILY
Refills: 0 | OUTPATIENT
Start: 2018-06-27

## 2018-06-27 RX ORDER — PHENOL 1.4 %
600 AEROSOL, SPRAY (ML) MUCOUS MEMBRANE 2 TIMES DAILY WITH MEALS
Refills: 0 | OUTPATIENT
Start: 2018-06-27

## 2018-06-27 RX ORDER — FUROSEMIDE 20 MG/1
TABLET ORAL
Qty: 30 TABLET | Refills: 3 | OUTPATIENT
Start: 2018-06-27

## 2018-06-27 RX ORDER — RANITIDINE 150 MG/1
TABLET ORAL
Qty: 60 TABLET | Refills: 3 | OUTPATIENT
Start: 2018-06-27

## 2018-06-29 RX ORDER — PHENOL 1.4 %
600 AEROSOL, SPRAY (ML) MUCOUS MEMBRANE 2 TIMES DAILY WITH MEALS
Refills: 0 | OUTPATIENT
Start: 2018-06-29

## 2018-06-29 RX ORDER — MULTIVITAMIN WITH FOLIC ACID 400 MCG
TABLET ORAL
Qty: 30 TABLET | Refills: 10 | OUTPATIENT
Start: 2018-06-29

## 2018-07-01 RX ORDER — PHENOL 1.4 %
600 AEROSOL, SPRAY (ML) MUCOUS MEMBRANE 2 TIMES DAILY WITH MEALS
Qty: 180 TABLET | Refills: 3 | Status: SHIPPED | OUTPATIENT
Start: 2018-07-01 | End: 2021-12-29

## 2018-07-01 RX ORDER — MULTIVITAMIN WITH FOLIC ACID 400 MCG
1 TABLET ORAL DAILY
Qty: 90 TABLET | Refills: 3 | Status: SHIPPED | OUTPATIENT
Start: 2018-07-01 | End: 2021-12-29

## 2018-07-02 RX ORDER — FOLIC ACID 1 MG/1
TABLET ORAL
Qty: 30 TABLET | Refills: 10 | OUTPATIENT
Start: 2018-07-02

## 2018-07-18 DIAGNOSIS — Z00.00 WELL ADULT EXAM: ICD-10-CM

## 2018-07-19 DIAGNOSIS — K21.9 GASTROESOPHAGEAL REFLUX DISEASE, ESOPHAGITIS PRESENCE NOT SPECIFIED: Primary | ICD-10-CM

## 2018-07-20 RX ORDER — MULTIVITAMIN WITH FOLIC ACID 400 MCG
TABLET ORAL
Qty: 30 TABLET | Refills: 5 | Status: SHIPPED | OUTPATIENT
Start: 2018-07-20

## 2018-07-24 RX ORDER — RANITIDINE 150 MG/1
TABLET ORAL
Qty: 60 TABLET | Refills: 5 | Status: SHIPPED | OUTPATIENT
Start: 2018-07-24 | End: 2020-06-18

## 2018-09-18 ENCOUNTER — OFFICE VISIT (OUTPATIENT)
Dept: PODIATRY | Facility: CLINIC | Age: 68
End: 2018-09-18
Payer: MEDICARE

## 2018-09-18 VITALS
HEIGHT: 63 IN | HEART RATE: 80 BPM | DIASTOLIC BLOOD PRESSURE: 76 MMHG | WEIGHT: 172 LBS | SYSTOLIC BLOOD PRESSURE: 126 MMHG | RESPIRATION RATE: 17 BRPM | BODY MASS INDEX: 30.48 KG/M2

## 2018-09-18 DIAGNOSIS — I70.209 ATHEROSCLEROSIS OF ARTERIES OF EXTREMITIES (HCC): ICD-10-CM

## 2018-09-18 DIAGNOSIS — M79.672 PAIN IN BOTH FEET: ICD-10-CM

## 2018-09-18 DIAGNOSIS — M21.961 DEFORMITY OF BOTH FEET: ICD-10-CM

## 2018-09-18 DIAGNOSIS — M21.962 DEFORMITY OF BOTH FEET: ICD-10-CM

## 2018-09-18 DIAGNOSIS — L84 CALLUS: Primary | ICD-10-CM

## 2018-09-18 DIAGNOSIS — M79.671 PAIN IN BOTH FEET: ICD-10-CM

## 2018-09-18 PROCEDURE — 11056 PARNG/CUTG B9 HYPRKR LES 2-4: CPT | Performed by: PODIATRIST

## 2018-09-18 NOTE — PROGRESS NOTES
Assessment/Plan:  Aseptic debridement of pre-trophic hyperkeratotic lesions ×4 plantar feet bilateral  Debridement with #10 scalpel debrided nonviable tissue down to healthy viable tissue  Aseptic debridement and planning of nails x10 and manually and mechanically  Discussed proper shoes discussed orthotics  May need acid treatment if not resolving  Follow-up 2 months     Diagnoses and all orders for this visit:    Callus    Pain in both feet    Atherosclerosis of arteries of extremities (HCC)    Deformity of both feet          Subjective:      Patient ID: Vinny Meredith is a 76 y o  female  Patient is follow-up for painful hyperkeratotic lesions bilateral feet  Been has very thick incurvated nails  Pain when walking wearing shoes  Aids have not noticed any redness or signs of infection  No past medical history on file  Current Outpatient Prescriptions:     alendronate (FOSAMAX) 70 mg tablet, Take 70 mg by mouth every 7 days, Disp: , Rfl:     CALCITONIN, SALMON, NA, into each nostril, Disp: , Rfl:     calcium carbonate (OS-YESICA) 600 MG tablet, Take by mouth, Disp: , Rfl:     calcium carbonate (OS-YESICA) 600 MG tablet, Take 1 tablet (600 mg total) by mouth 2 (two) times a day with meals, Disp: 180 tablet, Rfl: 3    ferrous [id number] C-folic acid (FEROCON) capsule, Take by mouth, Disp: , Rfl:     folic acid (FOLVITE) 1 mg tablet, Take by mouth daily, Disp: , Rfl:     furosemide (LASIX) 20 mg tablet, TAKE ONE TABLET @ 8AM  DR SAMANIEGO, Disp: 30 tablet, Rfl: 3    furosemide (LASIX) 20 mg tablet, TAKE 1 TABLET BY MOUTH DAILY @ 8AM  DR SAMANIEGO, Disp: 30 tablet, Rfl: 5    hydroxychloroquine (PLAQUENIL) 200 mg tablet, Take 200 mg by mouth 2 (two) times a day with meals, Disp: , Rfl:     lactase (LACTAID) 3,000 units tablet, Take 2 tablets (6,000 Units total) by mouth see administration instructions Take 2 tabs with first bite of dairy as needed  , Disp: 60 tablet, Rfl: 3   levETIRAcetam (KEPPRA) 500 mg tablet, , Disp: , Rfl:     loratadine (CLARITIN) 10 mg tablet, TAKE 1 TABLET BY MOUTH @ 8PM  DR CHANEY, Disp: 30 tablet, Rfl: 5    Multiple Vitamin (TAB-A-ANNELIESE) TABS, Take 1 tablet by mouth daily Take at 8 AM, Disp: 90 tablet, Rfl: 3    Multiple Vitamin (TAB-A-ANNELIESE) TABS, TAKE 1 TABLET BY MOUTH DAILY @ 8AM  DR ETIENNE, Disp: 30 tablet, Rfl: 5    Multiple Vitamins-Minerals (MULTIVITAL PO), Take by mouth, Disp: , Rfl:     PATADAY 0 2 % opth drops, , Disp: , Rfl:     PHENobarbital 64 8 mg tablet, Take 64 8 mg by mouth 2 (two) times a day, Disp: , Rfl:     pyridoxine (VITAMIN B6) 50 mg tablet, Take 50 mg by mouth daily, Disp: , Rfl:     Pyridoxine HCl (VITAMIN B-6) 50 MG TABS, Take by mouth, Disp: , Rfl:     ranitidine (ZANTAC) 150 mg tablet, Take 1 tablet (150 mg total) by mouth 2 (two) times a day Take at 8 AM and 8 PM, Disp: 60 tablet, Rfl: 3    ranitidine (ZANTAC) 150 mg tablet, TAKE 1 TABLET BY MOUTH DAILY @ 8AM AND @ 8PM, Disp: 60 tablet, Rfl: 5    Vitamin D, Cholecalciferol, 400 units TABS, Take 2 tablets (800 Units total) by mouth every morning Take 2 tab daily at 8AM, Disp: 60 tablet, Rfl: 3    No past surgical history on file  Allergies   Allergen Reactions    Erythromycin     Lactose Intolerance (Gi)     Nabumetone     Penicillins        Patient Active Problem List   Diagnosis    Atherosclerosis of arteries of extremities (HCC)    Callus    Deformity of ankle and foot, acquired    Hammer toes of both feet    Pain in both feet    Systemic lupus erythematosus ([address] )       Review of Systems   Constitutional: Negative  HENT: Negative  Eyes: Negative  Respiratory: Negative  Cardiovascular: Negative  Gastrointestinal: Negative  Endocrine: Negative  Genitourinary: Negative  Musculoskeletal: Negative  Skin: Negative  Allergic/Immunologic: Negative  Neurological: Negative  Hematological: Negative      Psychiatric/Behavioral: Negative  Objective:      /76   Pulse 80   Resp 17   Ht 5' 3" (1 6 m)   Wt 78 kg (172 lb)   BMI 30 47 kg/m²          Physical Exam     Constitutional: no acute distress, well appearing and well nourished   Pulmonary: no increased work of breathing or signs of respiratory distress   Cardiovascular: abnormal dorsalis pedis pulse,-- abnormal posterior tibialis pulse,-- elevation pallor,-- dependence rubor,-- abnormal capillary refill-- and-- edema   Orthopedic/Biomechanical: abnormal foot type,-- hammertoe(s),-- bunion(s),-- abnormal MPJ ROM,-- abnormal midtarsal joint ROM,-- abnormal subtalar joint ROM,-- decreased strength with dorsiflexion,-- discolored nails,-- ingrown nails-- and-- subungual debris, but-- normal strength   Skin: keratoses  Left Foot: Appearance: Normal except as noted: a deformity-- and-- pes planus  Great toe deformities include a bunion  Second toe deformities include hammer toe  Third toe deformities include hammer toe  Forth toe deformities include hammer toe  Fifth toe deformities include hammer toe  Evaluation of the great toe nail demonstrates paronychia-- and-- an ingrown nail  Negative erythema negative signs of infection  Hyperkeratotic lesions first digit in first MPJ bilateral large hyperkeratotic lesion distal right third digit  Special Tests: painful hyperkeratotic lesion on the distal aspect of right third digit secondary to contracted hammertoes patient has rigidly contracted hammertoes 2 through 5 bilateral      Right Foot: Appearance: Normal except as noted: a deformity-- and-- pes planus  Great toe deformities include a bunion  Second toe deformities include hammer toe  Third toe deformities include hammer toe  Forth toe deformities include hammer toe  Fifth toe deformities include hammer toe      Pre-ulcerative callus first and fifth met heads rigid hammertoes 2 through 5 bilateral moderate hallux valgus bilateral decreased range of motion first MPJ  Neurological Exam: performed  Light touch was decreased bilaterally  Vibratory sensation was decreased in both ankles  Response to monofilament test was 4/6 sites missed bilaterally  Deep tendon reflexes: achilles reflex absent bilateraly  Vascular Exam: Dorsalis pedis pulses were absent bilaterally  Posterior tibial pulses were absent bilaterally  Elevation Pallor: present bilaterally  Dependence rubor was present bilaterally  Capillary refill time was greater than 3 seconds bilaterally  Edema: moderate bilaterally  Toenails: All of the toenails were elongated,-- hypertrophied,-- discolored,-- shown to have subungual debris-- and-- tender  Hyperkeratosis: present on both first toes,-- present on the right third toe,-- present on both first sub metatarsals-- and-- skin hairless atrophic  Shoe Gear Evaluation: performed ()  Right Foot: width: d-- and-- length: 9  Left Foot: width: d-- and-- length: 9  Recommendation(s): athletic shoes-- and-- SAS style       Rigidly contracted hammertoes 2 through 5 bilateral   Painful hyperkeratotic lesion distal right 3rd digit   Painful hyperkeratotic lesion the distal medial aspect of the left hallux     No open wound or signs of infection  Skin hairless and atrophic vascular changes noted bilateral  Significant bunion hammertoes bilateral

## 2018-10-10 ENCOUNTER — OFFICE VISIT (OUTPATIENT)
Dept: AUDIOLOGY | Facility: CLINIC | Age: 68
End: 2018-10-10
Payer: MEDICARE

## 2018-10-10 DIAGNOSIS — H90.3 SENSORINEURAL HEARING LOSS, BILATERAL: Primary | ICD-10-CM

## 2018-10-10 PROCEDURE — 92557 COMPREHENSIVE HEARING TEST: CPT | Performed by: AUDIOLOGIST

## 2018-10-10 PROCEDURE — 92567 TYMPANOMETRY: CPT | Performed by: AUDIOLOGIST

## 2018-10-10 NOTE — PROGRESS NOTES
HEARING EVALUATION    Name:  Eduar Ponce  :  1950  Age:  76 y o  Date of Evaluation: 10/10/18     History: annual exam  Reason for visit: Eduar Ponce is being seen today at the request of Dr Deneen Ridley for an evaluation of hearing  Patient and caregiver report no changes in communcation abilities  EVALUATION:    Otoscopic Evaluation:   Right Ear: Moderate cerumen, Could visualize tympanic membrane   Left Ear: Moderate cerumen, Could visualize tympanic membrane    Tympanometry:   Right: Type A - normal middle ear pressure and compliance   Left: Type A - normal middle ear pressure and compliance    Audiogram Results:  Normal sloping to moderate/moderately severe sensorineural hearing loss bilaterally  WRS was good in the right ear and excellent in the left ear  Audiogram is stable since 17 hearing evaluation  *see attached audiogram      RECOMMENDATIONS:  Annual hearing eval and Return to [facility]  for F/U    PATIENT EDUCATION:   Discussed results and recommendations with patient and caregiver  Questions were addressed and the patient was encouraged to contact our department should concerns arise        Diane Sam, Audiology Intern  Meenakshi Posey , [address]  Clinical Audiologist

## 2018-10-10 NOTE — LETTER
October 10, 2018     Abby Rasmussen [address]  [address]    Patient: Eugene Zambrano   YOB: 1950   Date of Visit: 10/10/2018       Dear Dr Zuhair Pepper: Thank you for referring Stephanieemilie Tj to me for evaluation  Below are my notes for this consultation  If you have questions, please do not hesitate to call me  I look forward to following your patient along with you  Sincerely,        Betsy Melo        CC: No Recipients  Betsy Melo  10/10/2018  2:52 PM  Sign at close encounter  HEARING EVALUATION    Name:  Eugene Zambrano  :  1950  Age:  76 y o  Date of Evaluation: 10/10/18     History: annual exam  Reason for visit: Eugene Zambrano is being seen today at the request of Dr Zuhair Pepper for an evaluation of hearing  Patient and caregiver report no changes in communcation abilities  EVALUATION:    Otoscopic Evaluation:   Right Ear: Moderate cerumen, Could visualize tympanic membrane   Left Ear: Moderate cerumen, Could visualize tympanic membrane    Tympanometry:   Right: Type A - normal middle ear pressure and compliance   Left: Type A - normal middle ear pressure and compliance    Audiogram Results:  Normal sloping to moderate/moderately severe sensorineural hearing loss bilaterally  WRS was good in the right ear and excellent in the left ear  Audiogram is stable since 17 hearing evaluation  *see attached audiogram      RECOMMENDATIONS:  Annual hearing eval and Return to [facility]  for F/U    PATIENT EDUCATION:   Discussed results and recommendations with patient and caregiver  Questions were addressed and the patient was encouraged to contact our department should concerns arise        Betsy Melo, Audiology Intern  Meenakshi Greenberg , [address] [facility]  Clinical Audiologist

## 2018-12-04 ENCOUNTER — OFFICE VISIT (OUTPATIENT)
Dept: PODIATRY | Facility: CLINIC | Age: 68
End: 2018-12-04
Payer: MEDICARE

## 2018-12-04 VITALS — HEIGHT: 63 IN | WEIGHT: 172 LBS | BODY MASS INDEX: 30.48 KG/M2

## 2018-12-04 DIAGNOSIS — M21.962 DEFORMITY OF BOTH FEET: ICD-10-CM

## 2018-12-04 DIAGNOSIS — L84 CALLUS: Primary | ICD-10-CM

## 2018-12-04 DIAGNOSIS — I70.209 ATHEROSCLEROSIS OF ARTERIES OF EXTREMITIES (HCC): ICD-10-CM

## 2018-12-04 DIAGNOSIS — M79.672 PAIN IN BOTH FEET: ICD-10-CM

## 2018-12-04 DIAGNOSIS — M21.961 DEFORMITY OF BOTH FEET: ICD-10-CM

## 2018-12-04 DIAGNOSIS — M79.671 PAIN IN BOTH FEET: ICD-10-CM

## 2018-12-04 PROCEDURE — 11056 PARNG/CUTG B9 HYPRKR LES 2-4: CPT | Performed by: PODIATRIST

## 2018-12-04 NOTE — PROGRESS NOTES
Assessment/Plan:  Aseptic debridement of pre-trophic hyperkeratotic lesions ×4 plantar feet bilateral  Debridement with #10 scalpel debrided nonviable tissue down to healthy viable tissue  Aseptic debridement and planning of nails x10 and manually and mechanically    Daily foot checks monitor for signs of infection  Follow-up 3 months     Diagnoses and all orders for this visit:    Callus    Pain in both feet    Atherosclerosis of arteries of extremities (HCC)    Deformity of both feet          Subjective:      Patient ID: Veronica Nieto is a 76 y o  female  Patient is follow-up for painful hyperkeratotic lesions bilateral feet  Been has very thick incurvated nails  Pain when walking wearing shoes  Aids have not noticed any redness or signs of infection  No past medical history on file  Current Outpatient Prescriptions:     alendronate (FOSAMAX) 70 mg tablet, Take 70 mg by mouth every 7 days, Disp: , Rfl:     CALCITONIN, SALMON, NA, into each nostril, Disp: , Rfl:     calcium carbonate (OS-YESICA) 600 MG tablet, Take by mouth, Disp: , Rfl:     calcium carbonate (OS-YESICA) 600 MG tablet, Take 1 tablet (600 mg total) by mouth 2 (two) times a day with meals, Disp: 180 tablet, Rfl: 3    ferrous [id number] C-folic acid (FEROCON) capsule, Take by mouth, Disp: , Rfl:     folic acid (FOLVITE) 1 mg tablet, Take by mouth daily, Disp: , Rfl:     furosemide (LASIX) 20 mg tablet, TAKE ONE TABLET @ 8AM  DR SAMANIEGO, Disp: 30 tablet, Rfl: 3    furosemide (LASIX) 20 mg tablet, TAKE 1 TABLET BY MOUTH DAILY @ 8AM  DR SAMANIEGO, Disp: 30 tablet, Rfl: 5    hydroxychloroquine (PLAQUENIL) 200 mg tablet, Take 200 mg by mouth 2 (two) times a day with meals, Disp: , Rfl:     lactase (LACTAID) 3,000 units tablet, Take 2 tablets (6,000 Units total) by mouth see administration instructions Take 2 tabs with first bite of dairy as needed  , Disp: 60 tablet, Rfl: 3    levETIRAcetam (KEPPRA) 500 mg tablet, , Disp: , Rfl:     loratadine (CLARITIN) 10 mg tablet, TAKE 1 TABLET BY MOUTH @ 8PM  DR CHANEY, Disp: 30 tablet, Rfl: 5    Multiple Vitamin (TAB-A-ANNELIESE) TABS, Take 1 tablet by mouth daily Take at 8 AM, Disp: 90 tablet, Rfl: 3    Multiple Vitamin (TAB-A-ANNELIESE) TABS, TAKE 1 TABLET BY MOUTH DAILY @ 8AM  DR ETIENNE, Disp: 30 tablet, Rfl: 5    Multiple Vitamins-Minerals (MULTIVITAL PO), Take by mouth, Disp: , Rfl:     PATADAY 0 2 % opth drops, , Disp: , Rfl:     PHENobarbital 64 8 mg tablet, Take 64 8 mg by mouth 2 (two) times a day, Disp: , Rfl:     pyridoxine (VITAMIN B6) 50 mg tablet, Take 50 mg by mouth daily, Disp: , Rfl:     Pyridoxine HCl (VITAMIN B-6) 50 MG TABS, Take by mouth, Disp: , Rfl:     ranitidine (ZANTAC) 150 mg tablet, Take 1 tablet (150 mg total) by mouth 2 (two) times a day Take at 8 AM and 8 PM, Disp: 60 tablet, Rfl: 3    ranitidine (ZANTAC) 150 mg tablet, TAKE 1 TABLET BY MOUTH DAILY @ 8AM AND @ 8PM, Disp: 60 tablet, Rfl: 5    Vitamin D, Cholecalciferol, 400 units TABS, Take 2 tablets (800 Units total) by mouth every morning Take 2 tab daily at 8AM, Disp: 60 tablet, Rfl: 3    No past surgical history on file  Allergies   Allergen Reactions    Erythromycin     Lactose Intolerance (Gi)     Nabumetone     Penicillins        Patient Active Problem List   Diagnosis    Atherosclerosis of arteries of extremities (HCC)    Callus    Deformity of ankle and foot, acquired    Hammer toes of both feet    Pain in both feet    Systemic lupus erythematosus ([address] )       Review of Systems   Constitutional: Negative  HENT: Negative  Eyes: Negative  Respiratory: Negative  Cardiovascular: Negative  Gastrointestinal: Negative  Endocrine: Negative  Genitourinary: Negative  Musculoskeletal: Negative  Skin: Negative  Allergic/Immunologic: Negative  Neurological: Negative  Hematological: Negative  Psychiatric/Behavioral: Negative            Objective:      Ht 5' 3" (1 6 m)   Wt 78 kg (172 lb)   BMI 30 47 kg/m²          Physical Exam     Constitutional: no acute distress, well appearing and well nourished   Pulmonary: no increased work of breathing or signs of respiratory distress   Cardiovascular: abnormal dorsalis pedis pulse,-- abnormal posterior tibialis pulse,-- elevation pallor,-- dependence rubor,-- abnormal capillary refill-- and-- edema   Orthopedic/Biomechanical: abnormal foot type,-- hammertoe(s),-- bunion(s),-- abnormal MPJ ROM,-- abnormal midtarsal joint ROM,-- abnormal subtalar joint ROM,-- decreased strength with dorsiflexion,-- discolored nails,-- ingrown nails-- and-- subungual debris, but-- normal strength   Skin: keratoses  Left Foot: Appearance: Normal except as noted: a deformity-- and-- pes planus  Great toe deformities include a bunion  Second toe deformities include hammer toe  Third toe deformities include hammer toe  Forth toe deformities include hammer toe  Fifth toe deformities include hammer toe  Evaluation of the great toe nail demonstrates paronychia-- and-- an ingrown nail  Negative erythema negative signs of infection  Hyperkeratotic lesions first digit in first MPJ bilateral large hyperkeratotic lesion distal right third digit  Special Tests: painful hyperkeratotic lesion on the distal aspect of right third digit secondary to contracted hammertoes patient has rigidly contracted hammertoes 2 through 5 bilateral      Right Foot: Appearance: Normal except as noted: a deformity-- and-- pes planus  Great toe deformities include a bunion  Second toe deformities include hammer toe  Third toe deformities include hammer toe  Forth toe deformities include hammer toe  Fifth toe deformities include hammer toe      Pre-ulcerative callus first and fifth met heads rigid hammertoes 2 through 5 bilateral moderate hallux valgus bilateral decreased range of motion first MPJ  Neurological Exam: performed  Light touch was decreased bilaterally   Vibratory sensation was decreased in both ankles  Response to monofilament test was 4/6 sites missed bilaterally  Deep tendon reflexes: achilles reflex absent bilateraly  Vascular Exam: Dorsalis pedis pulses were absent bilaterally  Posterior tibial pulses were absent bilaterally  Elevation Pallor: present bilaterally  Dependence rubor was present bilaterally  Capillary refill time was greater than 3 seconds bilaterally  Edema: moderate bilaterally  Toenails: All of the toenails were elongated,-- hypertrophied,-- discolored,-- shown to have subungual debris-- and-- tender  Hyperkeratosis: present on both first toes,-- present on the right third toe,-- present on both first sub metatarsals-- and-- skin hairless atrophic  Shoe Gear Evaluation: performed ()  Right Foot: width: d-- and-- length: 9  Left Foot: width: d-- and-- length: 9  Recommendation(s): athletic shoes-- and-- SAS style       Rigidly contracted hammertoes 2 through 5 bilateral   Painful hyperkeratotic lesion distal right 3rd digit   Painful hyperkeratotic lesion the distal medial aspect of the left hallux     No open wound or signs of infection  Skin hairless and atrophic vascular changes noted bilateral  Significant bunion hammertoes bilateral  Moderate xerosis, negative erythema  Mild edema bilateral legs  Mild hyperpigmentation lower legs bilateral moderate venous stasis

## 2018-12-28 ENCOUNTER — APPOINTMENT (OUTPATIENT)
Dept: LAB | Facility: CLINIC | Age: 68
End: 2018-12-28
Payer: MEDICARE

## 2018-12-28 ENCOUNTER — TRANSCRIBE ORDERS (OUTPATIENT)
Dept: LAB | Facility: CLINIC | Age: 68
End: 2018-12-28

## 2018-12-28 DIAGNOSIS — D59.10 HEMOLYTIC ANEMIA ASSOCIATED WITH SYSTEMIC LUPUS ERYTHEMATOSUS (HCC): Primary | ICD-10-CM

## 2018-12-28 DIAGNOSIS — M32.9 HEMOLYTIC ANEMIA ASSOCIATED WITH SYSTEMIC LUPUS ERYTHEMATOSUS (HCC): Primary | ICD-10-CM

## 2018-12-28 DIAGNOSIS — I51.9 MYXEDEMA HEART DISEASE: ICD-10-CM

## 2018-12-28 DIAGNOSIS — E03.9 MYXEDEMA HEART DISEASE: ICD-10-CM

## 2018-12-28 DIAGNOSIS — Z79.899 ENCOUNTER FOR LONG-TERM (CURRENT) USE OF HIGH-RISK MEDICATION: ICD-10-CM

## 2018-12-28 LAB
ALBUMIN SERPL BCP-MCNC: 3.9 G/DL (ref 3.5–5)
ALP SERPL-CCNC: 78 U/L (ref 46–116)
ALT SERPL W P-5'-P-CCNC: 26 U/L (ref 12–78)
ANION GAP SERPL CALCULATED.3IONS-SCNC: 4 MMOL/L (ref 4–13)
AST SERPL W P-5'-P-CCNC: 35 U/L (ref 5–45)
BACTERIA UR QL AUTO: NORMAL /HPF
BASOPHILS # BLD AUTO: 0.07 THOUSANDS/ΜL (ref 0–0.1)
BASOPHILS NFR BLD AUTO: 1 % (ref 0–1)
BILIRUB DIRECT SERPL-MCNC: 0.05 MG/DL (ref 0–0.2)
BILIRUB SERPL-MCNC: 0.32 MG/DL (ref 0.2–1)
BILIRUB UR QL STRIP: NEGATIVE
BUN SERPL-MCNC: 12 MG/DL (ref 5–25)
C3 SERPL-MCNC: 135 MG/DL (ref 90–180)
C4 SERPL-MCNC: 24 MG/DL (ref 10–40)
CALCIUM SERPL-MCNC: 9 MG/DL (ref 8.3–10.1)
CHLORIDE SERPL-SCNC: 103 MMOL/L (ref 100–108)
CLARITY UR: CLEAR
CO2 SERPL-SCNC: 31 MMOL/L (ref 21–32)
COLOR UR: YELLOW
CREAT SERPL-MCNC: 0.85 MG/DL (ref 0.6–1.3)
EOSINOPHIL # BLD AUTO: 0 THOUSAND/ΜL (ref 0–0.61)
EOSINOPHIL NFR BLD AUTO: 0 % (ref 0–6)
ERYTHROCYTE [DISTWIDTH] IN BLOOD BY AUTOMATED COUNT: 13.1 % (ref 11.6–15.1)
ERYTHROCYTE [SEDIMENTATION RATE] IN BLOOD: 19 MM/HOUR (ref 0–20)
GFR SERPL CREATININE-BSD FRML MDRD: 81 ML/MIN/1.73SQ M
GLUCOSE P FAST SERPL-MCNC: 83 MG/DL (ref 65–99)
GLUCOSE UR STRIP-MCNC: NEGATIVE MG/DL
HCT VFR BLD AUTO: 37.8 % (ref 34.8–46.1)
HGB BLD-MCNC: 12 G/DL (ref 11.5–15.4)
HGB UR QL STRIP.AUTO: NEGATIVE
HYALINE CASTS #/AREA URNS LPF: NORMAL /LPF
IMM GRANULOCYTES # BLD AUTO: 0 THOUSAND/UL (ref 0–0.2)
IMM GRANULOCYTES NFR BLD AUTO: 0 % (ref 0–2)
KETONES UR STRIP-MCNC: NEGATIVE MG/DL
LEUKOCYTE ESTERASE UR QL STRIP: NEGATIVE
LYMPHOCYTES # BLD AUTO: 0.93 THOUSANDS/ΜL (ref 0.6–4.47)
LYMPHOCYTES NFR BLD AUTO: 18 % (ref 14–44)
MCH RBC QN AUTO: 29.9 PG (ref 26.8–34.3)
MCHC RBC AUTO-ENTMCNC: 31.7 G/DL (ref 31.4–37.4)
MCV RBC AUTO: 94 FL (ref 82–98)
MONOCYTES # BLD AUTO: 0.55 THOUSAND/ΜL (ref 0.17–1.22)
MONOCYTES NFR BLD AUTO: 11 % (ref 4–12)
NEUTROPHILS # BLD AUTO: 3.68 THOUSANDS/ΜL (ref 1.85–7.62)
NEUTS SEG NFR BLD AUTO: 70 % (ref 43–75)
NITRITE UR QL STRIP: NEGATIVE
NON-SQ EPI CELLS URNS QL MICRO: NORMAL /HPF
NRBC BLD AUTO-RTO: 0 /100 WBCS
PH UR STRIP.AUTO: 6 [PH] (ref 4.5–8)
PLATELET # BLD AUTO: 227 THOUSANDS/UL (ref 149–390)
PMV BLD AUTO: 11.2 FL (ref 8.9–12.7)
POTASSIUM SERPL-SCNC: 4 MMOL/L (ref 3.5–5.3)
PROT SERPL-MCNC: 7.9 G/DL (ref 6.4–8.2)
PROT UR STRIP-MCNC: NEGATIVE MG/DL
RBC # BLD AUTO: 4.02 MILLION/UL (ref 3.81–5.12)
RBC #/AREA URNS AUTO: NORMAL /HPF
SODIUM SERPL-SCNC: 138 MMOL/L (ref 136–145)
SP GR UR STRIP.AUTO: 1.01 (ref 1–1.03)
UROBILINOGEN UR QL STRIP.AUTO: 1 E.U./DL
WBC # BLD AUTO: 5.23 THOUSAND/UL (ref 4.31–10.16)
WBC #/AREA URNS AUTO: NORMAL /HPF

## 2018-12-28 PROCEDURE — 80053 COMPREHEN METABOLIC PANEL: CPT

## 2018-12-28 PROCEDURE — 81001 URINALYSIS AUTO W/SCOPE: CPT

## 2018-12-28 PROCEDURE — 85025 COMPLETE CBC W/AUTO DIFF WBC: CPT

## 2018-12-28 PROCEDURE — 85652 RBC SED RATE AUTOMATED: CPT

## 2018-12-28 PROCEDURE — 36415 COLL VENOUS BLD VENIPUNCTURE: CPT

## 2018-12-28 PROCEDURE — 82248 BILIRUBIN DIRECT: CPT

## 2018-12-28 PROCEDURE — 86160 COMPLEMENT ANTIGEN: CPT

## 2018-12-28 PROCEDURE — 86225 DNA ANTIBODY NATIVE: CPT

## 2018-12-29 LAB — DSDNA AB SER-ACNC: 2 IU/ML (ref 0–9)

## 2019-02-19 ENCOUNTER — OFFICE VISIT (OUTPATIENT)
Dept: PODIATRY | Facility: CLINIC | Age: 69
End: 2019-02-19
Payer: MEDICARE

## 2019-02-19 VITALS
WEIGHT: 172 LBS | RESPIRATION RATE: 16 BRPM | BODY MASS INDEX: 30.48 KG/M2 | DIASTOLIC BLOOD PRESSURE: 76 MMHG | HEIGHT: 63 IN | SYSTOLIC BLOOD PRESSURE: 126 MMHG

## 2019-02-19 DIAGNOSIS — M79.672 PAIN IN BOTH FEET: ICD-10-CM

## 2019-02-19 DIAGNOSIS — L84 CALLUS: Primary | ICD-10-CM

## 2019-02-19 DIAGNOSIS — M21.961 DEFORMITY OF BOTH FEET: ICD-10-CM

## 2019-02-19 DIAGNOSIS — M79.671 PAIN IN BOTH FEET: ICD-10-CM

## 2019-02-19 DIAGNOSIS — M21.962 DEFORMITY OF BOTH FEET: ICD-10-CM

## 2019-02-19 DIAGNOSIS — I70.209 ATHEROSCLEROSIS OF ARTERIES OF EXTREMITIES (HCC): ICD-10-CM

## 2019-02-19 PROCEDURE — 11056 PARNG/CUTG B9 HYPRKR LES 2-4: CPT | Performed by: PODIATRIST

## 2019-02-19 NOTE — PROGRESS NOTES
Assessment/Plan:    Aseptic debridement of pre-trophic hyperkeratotic lesions ×4 plantar feet bilateral  Debridement with #10 scalpel debrided nonviable tissue down to healthy viable tissue  Aseptic debridement and planning of nails x10 and manually and mechanically     Daily foot checks monitor for signs of infection  Follow-up 3 months         Diagnoses and all orders for this visit:    Callus    Pain in both feet    Atherosclerosis of arteries of extremities (HCC)    Deformity of both feet          Subjective:      Patient ID: Eugene Zambrano is a 76 y o  female  Patient has painful hyperkeratotic lesions that hurt when walking wearing shoes  Patient has not noticed any redness or signs of infection  Patient has significant bunion and hammertoes  Significant foot deformity bilateral   Pain when walking and standing  Aids have not noticed any redness or signs of infection      No past medical history on file  Current Outpatient Medications:     alendronate (FOSAMAX) 70 mg tablet, Take 70 mg by mouth every 7 days, Disp: , Rfl:     CALCITONIN, SALMON, NA, into each nostril, Disp: , Rfl:     calcium carbonate (OS-YESICA) 600 MG tablet, Take by mouth, Disp: , Rfl:     calcium carbonate (OS-YESICA) 600 MG tablet, Take 1 tablet (600 mg total) by mouth 2 (two) times a day with meals, Disp: 180 tablet, Rfl: 3    ferrous [id number] C-folic acid (FEROCON) capsule, Take by mouth, Disp: , Rfl:     folic acid (FOLVITE) 1 mg tablet, Take by mouth daily, Disp: , Rfl:     furosemide (LASIX) 20 mg tablet, TAKE ONE TABLET @ 8AM  DR SAMANIEGO, Disp: 30 tablet, Rfl: 3    furosemide (LASIX) 20 mg tablet, TAKE 1 TABLET BY MOUTH DAILY @ 8AM  DR SAMANIEGO, Disp: 30 tablet, Rfl: 5    hydroxychloroquine (PLAQUENIL) 200 mg tablet, Take 200 mg by mouth 2 (two) times a day with meals, Disp: , Rfl:     lactase (LACTAID) 3,000 units tablet, Take 2 tablets (6,000 Units total) by mouth see administration instructions Take 2 tabs with first bite of dairy as needed  , Disp: 60 tablet, Rfl: 3    levETIRAcetam (KEPPRA) 500 mg tablet, , Disp: , Rfl:     loratadine (CLARITIN) 10 mg tablet, TAKE 1 TABLET BY MOUTH @ 8PM  DR CHANEY, Disp: 30 tablet, Rfl: 5    Multiple Vitamin (TAB-A-ANNELIESE) TABS, Take 1 tablet by mouth daily Take at 8 AM, Disp: 90 tablet, Rfl: 3    Multiple Vitamin (TAB-A-ANNELIESE) TABS, TAKE 1 TABLET BY MOUTH DAILY @ 8AM  DR ETIENNE, Disp: 30 tablet, Rfl: 5    Multiple Vitamins-Minerals (MULTIVITAL PO), Take by mouth, Disp: , Rfl:     PATADAY 0 2 % opth drops, , Disp: , Rfl:     PHENobarbital 64 8 mg tablet, Take 64 8 mg by mouth 2 (two) times a day, Disp: , Rfl:     pyridoxine (VITAMIN B6) 50 mg tablet, Take 50 mg by mouth daily, Disp: , Rfl:     Pyridoxine HCl (VITAMIN B-6) 50 MG TABS, Take by mouth, Disp: , Rfl:     ranitidine (ZANTAC) 150 mg tablet, Take 1 tablet (150 mg total) by mouth 2 (two) times a day Take at 8 AM and 8 PM, Disp: 60 tablet, Rfl: 3    ranitidine (ZANTAC) 150 mg tablet, TAKE 1 TABLET BY MOUTH DAILY @ 8AM AND @ 8PM, Disp: 60 tablet, Rfl: 5    Vitamin D, Cholecalciferol, 400 units TABS, Take 2 tablets (800 Units total) by mouth every morning Take 2 tab daily at 8AM, Disp: 60 tablet, Rfl: 3    No past surgical history on file  Allergies   Allergen Reactions    Erythromycin     Lactose Intolerance (Gi)     Nabumetone     Penicillins        Patient Active Problem List   Diagnosis    Atherosclerosis of arteries of extremities (HCC)    Callus    Deformity of ankle and foot, acquired    Hammer toes of both feet    Pain in both feet    Systemic lupus erythematosus ([address] )       Review of Systems   Constitutional: Negative  HENT: Negative  Eyes: Negative  Respiratory: Negative  Cardiovascular: Negative  Gastrointestinal: Negative  Endocrine: Negative  Genitourinary: Negative  Musculoskeletal: Positive for arthralgias  Skin: Negative      Allergic/Immunologic: Negative  Neurological: Negative  Hematological: Negative  Psychiatric/Behavioral: Negative  Objective:  Patient's shoes and socks were removed, feet examined  /76   Resp 16   Ht 5' 3" (1 6 m)   Wt 78 kg (172 lb)   BMI 30 47 kg/m²       Foot Exam    General  General Appearance: appears stated age and healthy       Right Foot/Ankle     Inspection and Palpation  Arch: pes planus  Hammertoes: second toe, fifth toe, fourth toe and third toe  Hallux valgus: yes  Skin Exam: callus, dry skin and skin changes; Neurovascular  Dorsalis pedis: 1+  Posterior tibial: 1+  Achilles reflex: 1+  Babinski reflex: 1+      Left Foot/Ankle      Inspection and Palpation  Arch: pes planus  Hammertoes: second toe, fifth toe, fourth toe and third toe  Hallux valgus: yes  Skin Exam: callus, dry skin and skin changes; Neurovascular  Dorsalis pedis: 1+  Posterior tibial: 1+  Achilles reflex: 1+  Babinski reflex: 1+          Right Foot/Ankle   Right Foot Inspection  Skin Exam: dry skin, callus and callus                              Vascular    The right DP pulse is 1+  The right PT pulse is 1+  Right Toe  - Comprehensive Exam  Arch: pes planus  Hammertoes: second toe, fifth toe, fourth toe and third toe  Hallux valgus: yes    Left Foot/Ankle  Left Foot Inspection  Skin Exam: dry skin and callus                                           Vascular    The left DP pulse is 1+  The left PT pulse is 1+  Left Toe  - Comprehensive Exam  Arch: pes planus  Hammertoes: second toe, fifth toe, fourth toe and third toe  Hallux valgus: yes         Physical Exam   Cardiovascular:   Pulses:       Dorsalis pedis pulses are 1+ on the right side, and 1+ on the left side  Posterior tibial pulses are 1+ on the right side, and 1+ on the left side  Feet:   Right Foot:   Skin Integrity: Positive for callus and dry skin  Left Foot:   Skin Integrity: Positive for callus and dry skin     Neurological:   Reflex Scores:       Achilles reflexes are 1+ on the right side and 1+ on the left side  Rigidly contracted hammertoes 2 through 5 bilateral   Painful hyperkeratotic lesion distal right 3rd digit   Painful hyperkeratotic lesion the distal medial aspect of the left hallux  No open wound or signs of infection  Skin hairless and atrophic vascular changes noted bilateral  Significant bunion hammertoes bilateral  Moderate xerosis, negative erythema  Mild edema bilateral legs  Mild hyperpigmentation lower legs bilateral moderate venous stasis  There is some blood beneath the skin of the painful lesion distal right 3rd digit    No open wounds no signs of infection

## 2019-05-21 ENCOUNTER — OFFICE VISIT (OUTPATIENT)
Dept: PODIATRY | Facility: CLINIC | Age: 69
End: 2019-05-21
Payer: MEDICARE

## 2019-05-21 VITALS
HEART RATE: 80 BPM | SYSTOLIC BLOOD PRESSURE: 126 MMHG | HEIGHT: 63 IN | RESPIRATION RATE: 17 BRPM | WEIGHT: 172 LBS | BODY MASS INDEX: 30.48 KG/M2 | DIASTOLIC BLOOD PRESSURE: 76 MMHG

## 2019-05-21 DIAGNOSIS — M21.962 DEFORMITY OF BOTH FEET: ICD-10-CM

## 2019-05-21 DIAGNOSIS — M79.672 PAIN IN BOTH FEET: ICD-10-CM

## 2019-05-21 DIAGNOSIS — I70.209 ATHEROSCLEROSIS OF ARTERIES OF EXTREMITIES (HCC): Primary | ICD-10-CM

## 2019-05-21 DIAGNOSIS — M79.671 PAIN IN BOTH FEET: ICD-10-CM

## 2019-05-21 DIAGNOSIS — L84 CALLUS: ICD-10-CM

## 2019-05-21 DIAGNOSIS — M21.961 DEFORMITY OF BOTH FEET: ICD-10-CM

## 2019-05-21 PROCEDURE — 11056 PARNG/CUTG B9 HYPRKR LES 2-4: CPT | Performed by: PODIATRIST

## 2019-06-06 RX ORDER — MULTIVITAMIN WITH FOLIC ACID 400 MCG
TABLET ORAL
Qty: 30 TABLET | Refills: 6 | OUTPATIENT
Start: 2019-06-06

## 2019-06-12 RX ORDER — PHENOL 1.4 %
AEROSOL, SPRAY (ML) MUCOUS MEMBRANE
Qty: 180 TABLET | Refills: 3 | OUTPATIENT
Start: 2019-06-12

## 2019-06-12 RX ORDER — PHENOL 1.4 %
AEROSOL, SPRAY (ML) MUCOUS MEMBRANE
Qty: 180 TABLET | Refills: 1 | OUTPATIENT
Start: 2019-06-12

## 2019-06-18 ENCOUNTER — TRANSCRIBE ORDERS (OUTPATIENT)
Dept: LAB | Facility: CLINIC | Age: 69
End: 2019-06-18

## 2019-06-19 DIAGNOSIS — Z00.00 WELL ADULT EXAM: Primary | ICD-10-CM

## 2019-06-20 RX ORDER — PHENOL 1.4 %
AEROSOL, SPRAY (ML) MUCOUS MEMBRANE
Qty: 180 TABLET | Refills: 3 | Status: SHIPPED | OUTPATIENT
Start: 2019-06-20

## 2019-06-21 ENCOUNTER — TRANSCRIBE ORDERS (OUTPATIENT)
Dept: LAB | Facility: CLINIC | Age: 69
End: 2019-06-21

## 2019-06-21 ENCOUNTER — APPOINTMENT (OUTPATIENT)
Dept: LAB | Facility: CLINIC | Age: 69
End: 2019-06-21
Payer: MEDICARE

## 2019-06-21 DIAGNOSIS — M81.0 SENILE OSTEOPOROSIS: ICD-10-CM

## 2019-06-21 DIAGNOSIS — M32.14 LUPUS GLOMERULONEPHRITIS (HCC): ICD-10-CM

## 2019-06-21 DIAGNOSIS — D72.819 LEUKOPENIA, UNSPECIFIED TYPE: ICD-10-CM

## 2019-06-21 DIAGNOSIS — M32.14 LUPUS GLOMERULONEPHRITIS (HCC): Primary | ICD-10-CM

## 2019-06-21 DIAGNOSIS — R79.9 ABNORMAL BLOOD CHEMISTRY: ICD-10-CM

## 2019-06-21 LAB
ALBUMIN SERPL BCP-MCNC: 4.1 G/DL (ref 3.5–5)
ALP SERPL-CCNC: 89 U/L (ref 46–116)
ALT SERPL W P-5'-P-CCNC: 27 U/L (ref 12–78)
ANION GAP SERPL CALCULATED.3IONS-SCNC: 3 MMOL/L (ref 4–13)
AST SERPL W P-5'-P-CCNC: 35 U/L (ref 5–45)
BACTERIA UR QL AUTO: ABNORMAL /HPF
BILIRUB DIRECT SERPL-MCNC: 0.1 MG/DL (ref 0–0.2)
BILIRUB SERPL-MCNC: 0.32 MG/DL (ref 0.2–1)
BILIRUB UR QL STRIP: NEGATIVE
BUN SERPL-MCNC: 12 MG/DL (ref 5–25)
C3 SERPL-MCNC: 111 MG/DL (ref 90–180)
C4 SERPL-MCNC: 22 MG/DL (ref 10–40)
CALCIUM SERPL-MCNC: 8.8 MG/DL (ref 8.3–10.1)
CHLORIDE SERPL-SCNC: 104 MMOL/L (ref 100–108)
CLARITY UR: CLEAR
CO2 SERPL-SCNC: 29 MMOL/L (ref 21–32)
COLOR UR: YELLOW
CREAT SERPL-MCNC: 0.8 MG/DL (ref 0.6–1.3)
ERYTHROCYTE [DISTWIDTH] IN BLOOD BY AUTOMATED COUNT: 13.2 % (ref 11.6–15.1)
ERYTHROCYTE [SEDIMENTATION RATE] IN BLOOD: 14 MM/HOUR (ref 0–20)
GFR SERPL CREATININE-BSD FRML MDRD: 88 ML/MIN/1.73SQ M
GLUCOSE P FAST SERPL-MCNC: 80 MG/DL (ref 65–99)
GLUCOSE UR STRIP-MCNC: NEGATIVE MG/DL
HCT VFR BLD AUTO: 37.6 % (ref 34.8–46.1)
HGB BLD-MCNC: 11.9 G/DL (ref 11.5–15.4)
HGB UR QL STRIP.AUTO: NEGATIVE
HYALINE CASTS #/AREA URNS LPF: ABNORMAL /LPF
KETONES UR STRIP-MCNC: NEGATIVE MG/DL
LEUKOCYTE ESTERASE UR QL STRIP: ABNORMAL
MCH RBC QN AUTO: 29.5 PG (ref 26.8–34.3)
MCHC RBC AUTO-ENTMCNC: 31.6 G/DL (ref 31.4–37.4)
MCV RBC AUTO: 93 FL (ref 82–98)
NITRITE UR QL STRIP: NEGATIVE
NON-SQ EPI CELLS URNS QL MICRO: ABNORMAL /HPF
PH UR STRIP.AUTO: 6.5 [PH]
PLATELET # BLD AUTO: 192 THOUSANDS/UL (ref 149–390)
PMV BLD AUTO: 11.3 FL (ref 8.9–12.7)
POTASSIUM SERPL-SCNC: 3.7 MMOL/L (ref 3.5–5.3)
PROT SERPL-MCNC: 7.7 G/DL (ref 6.4–8.2)
PROT UR STRIP-MCNC: NEGATIVE MG/DL
RBC # BLD AUTO: 4.03 MILLION/UL (ref 3.81–5.12)
RBC #/AREA URNS AUTO: ABNORMAL /HPF
SODIUM SERPL-SCNC: 136 MMOL/L (ref 136–145)
SP GR UR STRIP.AUTO: 1.01 (ref 1–1.03)
UROBILINOGEN UR QL STRIP.AUTO: 0.2 E.U./DL
WBC # BLD AUTO: 3.77 THOUSAND/UL (ref 4.31–10.16)
WBC #/AREA URNS AUTO: ABNORMAL /HPF

## 2019-06-21 PROCEDURE — 86160 COMPLEMENT ANTIGEN: CPT

## 2019-06-21 PROCEDURE — 85652 RBC SED RATE AUTOMATED: CPT | Performed by: INTERNAL MEDICINE

## 2019-06-21 PROCEDURE — 80053 COMPREHEN METABOLIC PANEL: CPT | Performed by: INTERNAL MEDICINE

## 2019-06-21 PROCEDURE — 81001 URINALYSIS AUTO W/SCOPE: CPT | Performed by: INTERNAL MEDICINE

## 2019-06-21 PROCEDURE — 82248 BILIRUBIN DIRECT: CPT | Performed by: INTERNAL MEDICINE

## 2019-06-21 PROCEDURE — 86225 DNA ANTIBODY NATIVE: CPT

## 2019-06-21 PROCEDURE — 36415 COLL VENOUS BLD VENIPUNCTURE: CPT | Performed by: INTERNAL MEDICINE

## 2019-06-21 PROCEDURE — 85027 COMPLETE CBC AUTOMATED: CPT

## 2019-06-22 LAB — DSDNA AB SER-ACNC: 1 IU/ML (ref 0–9)

## 2019-07-19 RX ORDER — CLINDAMYCIN HYDROCHLORIDE 300 MG/1
300 CAPSULE ORAL ONCE
Qty: 2 CAPSULE | Refills: 1 | OUTPATIENT
Start: 2019-07-19 | End: 2019-07-19

## 2019-07-19 NOTE — TELEPHONE ENCOUNTER
CALLED - PT IS IN GROUP HOME  SPOKE TO Meghann Aponte  SHE SAID SHE WOULD  AND HAVE HER CALL  SHE DOESN'T KNOW WHO THE PT'S PCP IS

## 2019-08-13 ENCOUNTER — OFFICE VISIT (OUTPATIENT)
Dept: PODIATRY | Facility: CLINIC | Age: 69
End: 2019-08-13
Payer: MEDICARE

## 2019-08-13 VITALS
HEIGHT: 63 IN | DIASTOLIC BLOOD PRESSURE: 78 MMHG | BODY MASS INDEX: 30.48 KG/M2 | SYSTOLIC BLOOD PRESSURE: 128 MMHG | WEIGHT: 172 LBS

## 2019-08-13 DIAGNOSIS — I70.209 ATHEROSCLEROSIS OF ARTERIES OF EXTREMITIES (HCC): Primary | ICD-10-CM

## 2019-08-13 DIAGNOSIS — M21.961 DEFORMITY OF BOTH FEET: ICD-10-CM

## 2019-08-13 DIAGNOSIS — L84 CALLUS: ICD-10-CM

## 2019-08-13 DIAGNOSIS — M79.671 PAIN IN BOTH FEET: ICD-10-CM

## 2019-08-13 DIAGNOSIS — M79.672 PAIN IN BOTH FEET: ICD-10-CM

## 2019-08-13 DIAGNOSIS — M21.962 DEFORMITY OF BOTH FEET: ICD-10-CM

## 2019-08-13 PROCEDURE — 11056 PARNG/CUTG B9 HYPRKR LES 2-4: CPT | Performed by: PODIATRIST

## 2019-08-13 NOTE — PROGRESS NOTES
Assessment/Plan:    Aseptic debridement of pre-trophic hyperkeratotic lesions ×4 plantar feet bilateral  Debridement with #10 scalpel debrided nonviable tissue down to healthy viable tissue  Aseptic debridement and planning of nails x10 and manually and mechanically  Partial resection of ingrown nail left hallux medial border, debrided hyperkeratotic lesion, applied silver nitrate  Patient will call if any redness or signs of infection     Daily foot checks monitor for signs of infection  Follow-up 3 months         Diagnoses and all orders for this visit:    Atherosclerosis of arteries of extremities (HCC)    Callus    Pain in both feet    Deformity of both feet          Subjective:      Patient ID: Joe Mancera is a 76 y o  female  Patient has painful hyperkeratotic lesions that hurt when walking wearing shoes  Patient has not noticed any redness or signs of infection  No past medical history on file  Current Outpatient Medications:     alendronate (FOSAMAX) 70 mg tablet, Take 70 mg by mouth every 7 days, Disp: , Rfl:     CALCITONIN, SALMON, NA, into each nostril, Disp: , Rfl:     calcium carbonate (OS-YESICA) 600 MG tablet, Take by mouth, Disp: , Rfl:     calcium carbonate (OS-YESICA) 600 MG tablet, Take 1 tablet (600 mg total) by mouth 2 (two) times a day with meals, Disp: 180 tablet, Rfl: 3    calcium carbonate (OS-YESICA) 600 MG tablet, TAKE ONE TABLET BY MOUTH @ 8AM AND @ 8PM, Disp: 180 tablet, Rfl: 3    ferrous [id number] C-folic acid (FEROCON) capsule, Take by mouth, Disp: , Rfl:     folic acid (FOLVITE) 1 mg tablet, Take by mouth daily, Disp: , Rfl:     furosemide (LASIX) 20 mg tablet, TAKE ONE TABLET @ 8AM  DR SAMANIEGO, Disp: 30 tablet, Rfl: 3    furosemide (LASIX) 20 mg tablet, TAKE 1 TABLET BY MOUTH DAILY @ 8AM  DR SAMANIEGO, Disp: 30 tablet, Rfl: 5    hydroxychloroquine (PLAQUENIL) 200 mg tablet, Take 200 mg by mouth 2 (two) times a day with meals, Disp: , Rfl:     lactase (LACTAID) 3,000 units tablet, Take 2 tablets (6,000 Units total) by mouth see administration instructions Take 2 tabs with first bite of dairy as needed  , Disp: 60 tablet, Rfl: 3    levETIRAcetam (KEPPRA) 500 mg tablet, , Disp: , Rfl:     loratadine (CLARITIN) 10 mg tablet, TAKE 1 TABLET BY MOUTH @ 8PM  DR CHANEY, Disp: 30 tablet, Rfl: 5    Multiple Vitamin (TAB-A-ANNELIESE) TABS, Take 1 tablet by mouth daily Take at 8 AM, Disp: 90 tablet, Rfl: 3    Multiple Vitamin (TAB-A-ANNELIESE) TABS, TAKE 1 TABLET BY MOUTH DAILY @ 8AM  DR ETIENNE, Disp: 30 tablet, Rfl: 5    Multiple Vitamins-Minerals (MULTIVITAL PO), Take by mouth, Disp: , Rfl:     PATADAY 0 2 % opth drops, , Disp: , Rfl:     PHENobarbital 64 8 mg tablet, Take 64 8 mg by mouth 2 (two) times a day, Disp: , Rfl:     pyridoxine (VITAMIN B6) 50 mg tablet, Take 50 mg by mouth daily, Disp: , Rfl:     Pyridoxine HCl (VITAMIN B-6) 50 MG TABS, Take by mouth, Disp: , Rfl:     ranitidine (ZANTAC) 150 mg tablet, Take 1 tablet (150 mg total) by mouth 2 (two) times a day Take at 8 AM and 8 PM, Disp: 60 tablet, Rfl: 3    ranitidine (ZANTAC) 150 mg tablet, TAKE 1 TABLET BY MOUTH DAILY @ 8AM AND @ 8PM, Disp: 60 tablet, Rfl: 5    Vitamin D, Cholecalciferol, 400 units TABS, Take 2 tablets (800 Units total) by mouth every morning Take 2 tab daily at 8AM, Disp: 60 tablet, Rfl: 3    No past surgical history on file  Allergies   Allergen Reactions    Erythromycin     Lactose Intolerance (Gi)     Nabumetone     Penicillins        Patient Active Problem List   Diagnosis    Atherosclerosis of arteries of extremities (HCC)    Callus    Deformity of ankle and foot, acquired    Hammer toes of both feet    Pain in both feet    Systemic lupus erythematosus ([facility] [address] )       Review of Systems   Constitutional: Negative  HENT: Negative  Eyes: Negative  Respiratory: Negative  Cardiovascular: Negative  Gastrointestinal: Negative  Endocrine: Negative  Genitourinary: Negative  Musculoskeletal: Positive for arthralgias  Skin: Negative  Allergic/Immunologic: Negative  Neurological: Negative  Hematological: Negative  Psychiatric/Behavioral: Negative  Objective:  Patient's shoes and socks were removed, feet examined  /78   Ht 5' 3" (1 6 m)   Wt 78 kg (172 lb)   BMI 30 47 kg/m²       Foot Exam    General  General Appearance: appears stated age and healthy       Right Foot/Ankle     Inspection and Palpation  Arch: pes planus  Hammertoes: second toe, fifth toe, fourth toe and third toe  Hallux valgus: yes  Skin Exam: callus, dry skin and skin changes; Neurovascular  Dorsalis pedis: 1+  Posterior tibial: 1+  Achilles reflex: 1+  Babinski reflex: 1+      Left Foot/Ankle      Inspection and Palpation  Arch: pes planus  Hammertoes: second toe, fifth toe, fourth toe and third toe  Hallux valgus: yes  Skin Exam: callus, dry skin and skin changes; Neurovascular  Dorsalis pedis: 1+  Posterior tibial: 1+  Achilles reflex: 1+  Babinski reflex: 1+          Right Foot/Ankle   Right Foot Inspection  Skin Exam: dry skin, callus and callus                              Vascular    The right DP pulse is 1+  The right PT pulse is 1+  Right Toe  - Comprehensive Exam  Arch: pes planus  Hammertoes: second toe, fifth toe, fourth toe and third toe  Hallux valgus: yes    Left Foot/Ankle  Left Foot Inspection  Skin Exam: dry skin and callus                                           Vascular    The left DP pulse is 1+  The left PT pulse is 1+  Left Toe  - Comprehensive Exam  Arch: pes planus  Hammertoes: second toe, fifth toe, fourth toe and third toe  Hallux valgus: yes         Physical Exam   Cardiovascular:   Pulses:       Dorsalis pedis pulses are 1+ on the right side, and 1+ on the left side  Posterior tibial pulses are 1+ on the right side, and 1+ on the left side     Feet:   Right Foot:   Skin Integrity: Positive for callus and dry skin    Left Foot:   Skin Integrity: Positive for callus and dry skin  Neurological:   Reflex Scores:       Achilles reflexes are 1+ on the right side and 1+ on the left side  Rigidly contracted hammertoes 2 through 5 bilateral   Painful hyperkeratotic lesion distal right 3rd digit   Painful hyperkeratotic lesion the distal medial aspect of the left hallux     No open wound or signs of infection  Skin hairless and atrophic vascular changes noted bilateral  Significant bunion hammertoes bilateral    Plus one edema bilateral feet and ankles  Hyperpigmentation lower legs bilateral  Venous stasis bilateral    Subungual hematoma left 3rd digit resolving nail is not loose no sign of  Small amount of blood but lesion of the right 3rd digit, no exudate or signs of infection  Ingrown left hallux medial border, some blood beneath the skin dried no sign of infection

## 2019-08-16 ENCOUNTER — TRANSCRIBE ORDERS (OUTPATIENT)
Dept: LAB | Facility: CLINIC | Age: 69
End: 2019-08-16

## 2019-08-29 ENCOUNTER — TRANSCRIBE ORDERS (OUTPATIENT)
Dept: LAB | Facility: CLINIC | Age: 69
End: 2019-08-29

## 2019-08-29 ENCOUNTER — APPOINTMENT (OUTPATIENT)
Dept: LAB | Facility: CLINIC | Age: 69
End: 2019-08-29
Payer: MEDICARE

## 2019-08-29 DIAGNOSIS — M32.9 SYSTEMIC LUPUS ERYTHEMATOSUS, UNSPECIFIED SLE TYPE, UNSPECIFIED ORGAN INVOLVEMENT STATUS (HCC): ICD-10-CM

## 2019-08-29 DIAGNOSIS — I51.9 MYXEDEMA HEART DISEASE: ICD-10-CM

## 2019-08-29 DIAGNOSIS — Z79.899 ENCOUNTER FOR LONG-TERM (CURRENT) USE OF OTHER MEDICATIONS: ICD-10-CM

## 2019-08-29 DIAGNOSIS — M32.9 SYSTEMIC LUPUS ERYTHEMATOSUS, UNSPECIFIED SLE TYPE, UNSPECIFIED ORGAN INVOLVEMENT STATUS (HCC): Primary | ICD-10-CM

## 2019-08-29 DIAGNOSIS — E03.9 MYXEDEMA HEART DISEASE: ICD-10-CM

## 2019-08-29 LAB
ALBUMIN SERPL BCP-MCNC: 4.6 G/DL (ref 3.5–5)
ALP SERPL-CCNC: 87 U/L (ref 46–116)
ALT SERPL W P-5'-P-CCNC: 20 U/L (ref 12–78)
ANION GAP SERPL CALCULATED.3IONS-SCNC: 6 MMOL/L (ref 4–13)
AST SERPL W P-5'-P-CCNC: 32 U/L (ref 5–45)
BACTERIA UR QL AUTO: NORMAL /HPF
BASOPHILS # BLD AUTO: 0.06 THOUSANDS/ΜL (ref 0–0.1)
BASOPHILS NFR BLD AUTO: 2 % (ref 0–1)
BILIRUB DIRECT SERPL-MCNC: 0.09 MG/DL (ref 0–0.2)
BILIRUB SERPL-MCNC: 0.4 MG/DL (ref 0.2–1)
BILIRUB UR QL STRIP: NEGATIVE
BUN SERPL-MCNC: 13 MG/DL (ref 5–25)
C3 SERPL-MCNC: 113 MG/DL (ref 90–180)
C4 SERPL-MCNC: 22 MG/DL (ref 10–40)
CALCIUM SERPL-MCNC: 9.8 MG/DL (ref 8.3–10.1)
CHLORIDE SERPL-SCNC: 106 MMOL/L (ref 100–108)
CLARITY UR: CLEAR
CO2 SERPL-SCNC: 30 MMOL/L (ref 21–32)
COLOR UR: YELLOW
CREAT SERPL-MCNC: 0.94 MG/DL (ref 0.6–1.3)
EOSINOPHIL # BLD AUTO: 0 THOUSAND/ΜL (ref 0–0.61)
EOSINOPHIL NFR BLD AUTO: 0 % (ref 0–6)
ERYTHROCYTE [DISTWIDTH] IN BLOOD BY AUTOMATED COUNT: 12.8 % (ref 11.6–15.1)
ERYTHROCYTE [SEDIMENTATION RATE] IN BLOOD: 18 MM/HOUR (ref 0–20)
GFR SERPL CREATININE-BSD FRML MDRD: 72 ML/MIN/1.73SQ M
GLUCOSE P FAST SERPL-MCNC: 78 MG/DL (ref 65–99)
GLUCOSE UR STRIP-MCNC: NEGATIVE MG/DL
HCT VFR BLD AUTO: 38.8 % (ref 34.8–46.1)
HGB BLD-MCNC: 12.3 G/DL (ref 11.5–15.4)
HGB UR QL STRIP.AUTO: NEGATIVE
HYALINE CASTS #/AREA URNS LPF: NORMAL /LPF
IMM GRANULOCYTES # BLD AUTO: 0.01 THOUSAND/UL (ref 0–0.2)
IMM GRANULOCYTES NFR BLD AUTO: 0 % (ref 0–2)
KETONES UR STRIP-MCNC: NEGATIVE MG/DL
LEUKOCYTE ESTERASE UR QL STRIP: ABNORMAL
LYMPHOCYTES # BLD AUTO: 0.82 THOUSANDS/ΜL (ref 0.6–4.47)
LYMPHOCYTES NFR BLD AUTO: 23 % (ref 14–44)
MCH RBC QN AUTO: 29.5 PG (ref 26.8–34.3)
MCHC RBC AUTO-ENTMCNC: 31.7 G/DL (ref 31.4–37.4)
MCV RBC AUTO: 93 FL (ref 82–98)
MONOCYTES # BLD AUTO: 0.4 THOUSAND/ΜL (ref 0.17–1.22)
MONOCYTES NFR BLD AUTO: 11 % (ref 4–12)
NEUTROPHILS # BLD AUTO: 2.28 THOUSANDS/ΜL (ref 1.85–7.62)
NEUTS SEG NFR BLD AUTO: 64 % (ref 43–75)
NITRITE UR QL STRIP: NEGATIVE
NON-SQ EPI CELLS URNS QL MICRO: NORMAL /HPF
NRBC BLD AUTO-RTO: 0 /100 WBCS
PH UR STRIP.AUTO: 6.5 [PH]
PLATELET # BLD AUTO: 209 THOUSANDS/UL (ref 149–390)
PMV BLD AUTO: 11.7 FL (ref 8.9–12.7)
POTASSIUM SERPL-SCNC: 4.2 MMOL/L (ref 3.5–5.3)
PROT SERPL-MCNC: 7.9 G/DL (ref 6.4–8.2)
PROT UR STRIP-MCNC: NEGATIVE MG/DL
RBC # BLD AUTO: 4.17 MILLION/UL (ref 3.81–5.12)
RBC #/AREA URNS AUTO: NORMAL /HPF
SODIUM SERPL-SCNC: 142 MMOL/L (ref 136–145)
SP GR UR STRIP.AUTO: 1.01 (ref 1–1.03)
UROBILINOGEN UR QL STRIP.AUTO: 0.2 E.U./DL
WBC # BLD AUTO: 3.57 THOUSAND/UL (ref 4.31–10.16)
WBC #/AREA URNS AUTO: NORMAL /HPF

## 2019-08-29 PROCEDURE — 81001 URINALYSIS AUTO W/SCOPE: CPT

## 2019-08-29 PROCEDURE — 85025 COMPLETE CBC W/AUTO DIFF WBC: CPT

## 2019-08-29 PROCEDURE — 36415 COLL VENOUS BLD VENIPUNCTURE: CPT

## 2019-08-29 PROCEDURE — 82248 BILIRUBIN DIRECT: CPT

## 2019-08-29 PROCEDURE — 85652 RBC SED RATE AUTOMATED: CPT

## 2019-08-29 PROCEDURE — 86160 COMPLEMENT ANTIGEN: CPT

## 2019-08-29 PROCEDURE — 80053 COMPREHEN METABOLIC PANEL: CPT

## 2019-08-29 PROCEDURE — 86225 DNA ANTIBODY NATIVE: CPT

## 2019-08-30 LAB — DSDNA AB SER-ACNC: 1 IU/ML (ref 0–9)

## 2019-10-15 ENCOUNTER — OFFICE VISIT (OUTPATIENT)
Dept: AUDIOLOGY | Facility: CLINIC | Age: 69
End: 2019-10-15
Payer: MEDICARE

## 2019-10-15 DIAGNOSIS — H90.3 SENSORINEURAL HEARING LOSS, BILATERAL: Primary | ICD-10-CM

## 2019-10-15 PROCEDURE — 92567 TYMPANOMETRY: CPT | Performed by: AUDIOLOGIST

## 2019-10-15 PROCEDURE — 92557 COMPREHENSIVE HEARING TEST: CPT | Performed by: AUDIOLOGIST

## 2019-10-15 NOTE — LETTER
October 15, 2019     Alicia Sheffield MD  [address]    Patient: Trino Garcia   YOB: 1950   Date of Visit: 10/15/2019       Dear Dr Mingo Suggs:     Kobe Donnelly was seen today for an annual audiological evaluation  Below are my notes for this consultation  If you have questions, please do not hesitate to call me  Sincerely,        Meenakshi Carnes , ELIESER/A  Clinical Audiologist   [place] [id number]        CC: No Recipients  Kayleigh Marrero  10/15/2019  5:00 PM  Sign at close encounter  HEARING EVALUATION    Name:  Trino Garcia  :  1950  Age:  71 y o  Date of Evaluation: 10/15/19     History: Annual audiogram; patient has a known hearing loss, has not wanted hearing aids  Reason for visit: Trino Garcia is being seen today at the request of Dr Mingo Suggs for an evaluation of hearing  Caregiver reports Tavia Tavarez is interested in trying hearing aids this year  She herself is noticing difficulty hearing her caregivers  EVALUATION:    Otoscopic Evaluation:   Right Ear: Mild cerumen   Left Ear: Mild cerumen    Tympanometry:   Right: Type A - normal middle ear pressure and compliance   Left: Type A - normal middle ear pressure and compliance    Audiogram Results:  Mild to severe high frequency loss 1043-6025 Hz  Word recognition scores are good bilaterally  Thresholds are stable to previous examinations  *see attached audiogram    RECOMMENDATIONS:  1  [facility] situation with Justin Hutchinsonl, [address] [place], [place] [address] St caregiver  We will contact her when we are able to secure pre-authorization for the hearing aids  2  Hearing aid trial following completion of the above  PATIENT EDUCATION:   Discussed results and recommendations with Ms Ayla Aguayo and her caregiver  Questions were addressed and the patient was encouraged to contact our department should concerns arise      Meenakshi Carnes , CCC-A, [place]# [id number], Hearing Aid Dispenser, [place]# [id number]  Clinical Audiologist

## 2019-10-15 NOTE — PROGRESS NOTES
HEARING EVALUATION    Name:  Keshia De Jesus  :  1950  Age:  71 y o  Date of Evaluation: 10/15/19     History: Annual audiogram; patient has a known hearing loss, has not wanted hearing aids  Reason for visit: Keshia De Jesus is being seen today at the request of Dr Reuben Lopez for an evaluation of hearing  Caregiver reports Catherine Smith is interested in trying hearing aids this year  She herself is noticing difficulty hearing her caregivers  EVALUATION:    Otoscopic Evaluation:   Right Ear: Mild cerumen   Left Ear: Mild cerumen    Tympanometry:   Right: Type A - normal middle ear pressure and compliance   Left: Type A - normal middle ear pressure and compliance    Audiogram Results:  Mild to severe high frequency loss 6738-7793 Hz  Word recognition scores are good bilaterally  Thresholds are stable to previous examinations  *see attached audiogram    RECOMMENDATIONS:  1  Discussed [facility] situation with Padma Avila, [address] [place] [address] caregiver  We will contact her when we are able to secure pre-authorization for the hearing aids  2  Hearing aid trial following completion of the above  PATIENT EDUCATION:   Discussed results and recommendations with Ms Elmer Umana and her caregiver  Questions were addressed and the patient was encouraged to contact our department should concerns arise      Meenakshi Clayton , CCC-A, [place]# [id number], Hearing Aid Dispenser, [place]# [id number]  Clinical Audiologist

## 2019-11-13 ENCOUNTER — OFFICE VISIT (OUTPATIENT)
Dept: PODIATRY | Facility: CLINIC | Age: 69
End: 2019-11-13
Payer: MEDICARE

## 2019-11-13 VITALS
DIASTOLIC BLOOD PRESSURE: 78 MMHG | RESPIRATION RATE: 17 BRPM | BODY MASS INDEX: 30.48 KG/M2 | HEIGHT: 63 IN | HEART RATE: 80 BPM | WEIGHT: 172 LBS | SYSTOLIC BLOOD PRESSURE: 128 MMHG

## 2019-11-13 DIAGNOSIS — M79.672 PAIN IN BOTH FEET: ICD-10-CM

## 2019-11-13 DIAGNOSIS — B35.1 ONYCHOMYCOSIS: ICD-10-CM

## 2019-11-13 DIAGNOSIS — L84 CALLUS: ICD-10-CM

## 2019-11-13 DIAGNOSIS — M79.671 PAIN IN BOTH FEET: ICD-10-CM

## 2019-11-13 DIAGNOSIS — I70.209 ATHEROSCLEROSIS OF ARTERIES OF EXTREMITIES (HCC): Primary | ICD-10-CM

## 2019-11-13 PROCEDURE — 11056 PARNG/CUTG B9 HYPRKR LES 2-4: CPT | Performed by: PODIATRIST

## 2019-11-13 PROCEDURE — 11720 DEBRIDE NAIL 1-5: CPT | Performed by: PODIATRIST

## 2019-11-13 NOTE — PROGRESS NOTES
Expand All Collapse All    Assessment/Plan:     Aseptic debridement of pre-trophic hyperkeratotic lesions ×4 plantar feet bilateral  Debridement with #10 scalpel debrided nonviable tissue down to healthy viable tissue      Aseptic debridement and planning of nails x10 and manually and mechanically     Daily foot checks monitor for signs of infection  Follow-up 3 months            Diagnoses and all orders for this visit:     Atherosclerosis of arteries of extremities (HCC)     Pain in both feet     Callus     Deformity of both feet            Subjective:       Patient ID: Mainor Lvey is a 76 y o  female      Patient has painful hyperkeratotic lesions that hurt when walking wearing shoes  Patient has not noticed any redness or signs of infection         Medical History   No past medical history on file            Current Outpatient Medications:     alendronate (FOSAMAX) 70 mg tablet, Take 70 mg by mouth every 7 days, Disp: , Rfl:     CALCITONIN, SALMON, NA, into each nostril, Disp: , Rfl:     calcium carbonate (OS-YESICA) 600 MG tablet, Take by mouth, Disp: , Rfl:     calcium carbonate (OS-YESICA) 600 MG tablet, Take 1 tablet (600 mg total) by mouth 2 (two) times a day with meals, Disp: 180 tablet, Rfl: 3    ferrous [id number] C-folic acid (FEROCON) capsule, Take by mouth, Disp: , Rfl:     folic acid (FOLVITE) 1 mg tablet, Take by mouth daily, Disp: , Rfl:     furosemide (LASIX) 20 mg tablet, TAKE ONE TABLET @ 8AM  DR SAMANIEGO, Disp: 30 tablet, Rfl: 3    furosemide (LASIX) 20 mg tablet, TAKE 1 TABLET BY MOUTH DAILY @ 8AM  DR SAMANIEGO, Disp: 30 tablet, Rfl: 5    hydroxychloroquine (PLAQUENIL) 200 mg tablet, Take 200 mg by mouth 2 (two) times a day with meals, Disp: , Rfl:     lactase (LACTAID) 3,000 units tablet, Take 2 tablets (6,000 Units total) by mouth see administration instructions Take 2 tabs with first bite of dairy as needed  , Disp: 60 tablet, Rfl: 3    levETIRAcetam (KEPPRA) 500 mg tablet, , Disp: , Rfl:     loratadine (CLARITIN) 10 mg tablet, TAKE 1 TABLET BY MOUTH @ 8PM  DR CHANEY, Disp: 30 tablet, Rfl: 5    Multiple Vitamin (TAB-A-ANNELIESE) TABS, Take 1 tablet by mouth daily Take at 8 AM, Disp: 90 tablet, Rfl: 3    Multiple Vitamin (TAB-A-ANNELIESE) TABS, TAKE 1 TABLET BY MOUTH DAILY @ 8AM  DR ETIENNE, Disp: 30 tablet, Rfl: 5    Multiple Vitamins-Minerals (MULTIVITAL PO), Take by mouth, Disp: , Rfl:     PATADAY 0 2 % opth drops, , Disp: , Rfl:     PHENobarbital 64 8 mg tablet, Take 64 8 mg by mouth 2 (two) times a day, Disp: , Rfl:     pyridoxine (VITAMIN B6) 50 mg tablet, Take 50 mg by mouth daily, Disp: , Rfl:     Pyridoxine HCl (VITAMIN B-6) 50 MG TABS, Take by mouth, Disp: , Rfl:     ranitidine (ZANTAC) 150 mg tablet, Take 1 tablet (150 mg total) by mouth 2 (two) times a day Take at 8 AM and 8 PM, Disp: 60 tablet, Rfl: 3    ranitidine (ZANTAC) 150 mg tablet, TAKE 1 TABLET BY MOUTH DAILY @ 8AM AND @ 8PM, Disp: 60 tablet, Rfl: 5    Vitamin D, Cholecalciferol, 400 units TABS, Take 2 tablets (800 Units total) by mouth every morning Take 2 tab daily at 8AM, Disp: 60 tablet, Rfl: 3     Surgical History   No past surgical history on file              Allergies   Allergen Reactions    Erythromycin      Lactose Intolerance (Gi)      Nabumetone      Penicillins               Patient Active Problem List   Diagnosis    Atherosclerosis of arteries of extremities (HCC)    Callus    Deformity of ankle and foot, acquired    Hammer toes of both feet    Pain in both feet    Systemic lupus erythematosus (HCC)         Review of Systems   Constitutional: Negative  HENT: Negative  Eyes: Negative  Respiratory: Negative  Cardiovascular: Negative  Gastrointestinal: Negative  Endocrine: Negative  Genitourinary: Negative  Musculoskeletal: Positive for arthralgias  Skin: Negative  Allergic/Immunologic: Negative  Neurological: Negative  Hematological: Negative  Psychiatric/Behavioral: Negative            Objective:  Patient's shoes and socks were removed, feet examined       /76   Pulse 80   Resp 17   Ht 5' 3" (1 6 m)   Wt 78 kg (172 lb)   BMI 30 47 kg/m²         Foot Exam    Q, 9 findings bilateral   Abnormal cooling noted bilateral   Negative digital hair of digits     General  General Appearance: appears stated age and healthy         Right Foot/Ankle      Inspection and Palpation  Arch: pes planus  Hammertoes: second toe, fifth toe, fourth toe and third toe  Hallux valgus: yes  Skin Exam: callus, dry skin and skin changes;      Neurovascular  Dorsalis pedis: 1+  Posterior tibial: 1+  Achilles reflex: 1+  Babinski reflex: 1+        Left Foot/Ankle       Inspection and Palpation  Arch: pes planus  Hammertoes: second toe, fifth toe, fourth toe and third toe  Hallux valgus: yes  Skin Exam: callus, dry skin and skin changes;      Neurovascular  Dorsalis pedis: 1+  Posterior tibial: 1+  Achilles reflex: 1+  Babinski reflex: 1+              Right Foot/Ankle   Right Foot Inspection  Skin Exam: dry skin, callus and callus                                 Vascular     The right DP pulse is 1+  The right PT pulse is 1+  Right Toe  - Comprehensive Exam  Arch: pes planus  Hammertoes: second toe, fifth toe, fourth toe and third toe  Hallux valgus: yes     Left Foot/Ankle  Left Foot Inspection  Skin Exam: dry skin and callus                                             Vascular     The left DP pulse is 1+  The left PT pulse is 1+  Left Toe  - Comprehensive Exam  Arch: pes planus  Hammertoes: second toe, fifth toe, fourth toe and third toe  Hallux valgus: yes          Physical Exam   Cardiovascular:   Pulses:       Dorsalis pedis pulses are 1+ on the right side, and 1+ on the left side  Posterior tibial pulses are 1+ on the right side, and 1+ on the left side  Feet:   Right Foot:   Skin Integrity: Positive for callus and dry skin     Left Foot:   Skin Integrity: Positive for callus and dry skin     Neurological:   Reflex Scores:       Achilles reflexes are 1+ on the right side and 1+ on the left side          Rigidly contracted hammertoes 2 through 5 bilateral   Painful hyperkeratotic lesion distal right 3rd digit   Painful hyperkeratotic lesion the distal medial aspect of the left hallux    No open wound or signs of infection  Skin hairless and atrophic vascular changes noted bilateral  Significant bunion hammertoes bilateral     Plus one edema bilateral feet and ankles  Hyperpigmentation lower legs bilateral  Venous stasis bilateral     Subungual hematoma left 3rd digit significantly improved no sign of infection nail is not loose there is no pain

## 2020-01-23 ENCOUNTER — OFFICE VISIT (OUTPATIENT)
Dept: PODIATRY | Facility: CLINIC | Age: 70
End: 2020-01-23
Payer: MEDICARE

## 2020-01-23 VITALS
WEIGHT: 172 LBS | BODY MASS INDEX: 30.48 KG/M2 | DIASTOLIC BLOOD PRESSURE: 77 MMHG | HEIGHT: 63 IN | SYSTOLIC BLOOD PRESSURE: 128 MMHG

## 2020-01-23 DIAGNOSIS — M79.672 PAIN IN BOTH FEET: ICD-10-CM

## 2020-01-23 DIAGNOSIS — R26.2 DIFFICULTY IN WALKING INVOLVING ANKLE AND FOOT JOINT: Primary | ICD-10-CM

## 2020-01-23 DIAGNOSIS — M79.671 PAIN IN BOTH FEET: ICD-10-CM

## 2020-01-23 DIAGNOSIS — B35.1 ONYCHOMYCOSIS: ICD-10-CM

## 2020-01-23 PROCEDURE — 11721 DEBRIDE NAIL 6 OR MORE: CPT | Performed by: PODIATRIST

## 2020-01-29 NOTE — PROGRESS NOTES
Expand All Collapse All    Assessment/Plan:     Aseptic debridement of pre-trophic hyperkeratotic lesions ×4 plantar feet bilateral  Debridement with #10 scalpel debrided nonviable tissue down to healthy viable tissue      Aseptic debridement and planning of nails x10 and manually and mechanically     Daily foot checks monitor for signs of infection  Follow-up 3 months            Diagnoses and all orders for this visit:     onychomycosis     Pain in both feet     Callus     Deformity of both feet            Subjective:       Patient ID: Calvin Cortés is a 76 y o  female      Patient reports marked nail discoloration and thickness , patient is a special need and is unable to self treat        Medical History   No past medical history on file            Current Outpatient Medications:     alendronate (FOSAMAX) 70 mg tablet, Take 70 mg by mouth every 7 days, Disp: , Rfl:     CALCITONIN, SALMON, NA, into each nostril, Disp: , Rfl:     calcium carbonate (OS-YESICA) 600 MG tablet, Take by mouth, Disp: , Rfl:     calcium carbonate (OS-YESICA) 600 MG tablet, Take 1 tablet (600 mg total) by mouth 2 (two) times a day with meals, Disp: 180 tablet, Rfl: 3    ferrous [id number] C-folic acid (FEROCON) capsule, Take by mouth, Disp: , Rfl:     folic acid (FOLVITE) 1 mg tablet, Take by mouth daily, Disp: , Rfl:     furosemide (LASIX) 20 mg tablet, TAKE ONE TABLET @ 8AM  DR SAMANIEGO, Disp: 30 tablet, Rfl: 3    furosemide (LASIX) 20 mg tablet, TAKE 1 TABLET BY MOUTH DAILY @ 8AM  DR SAMANIEGO, Disp: 30 tablet, Rfl: 5    hydroxychloroquine (PLAQUENIL) 200 mg tablet, Take 200 mg by mouth 2 (two) times a day with meals, Disp: , Rfl:     lactase (LACTAID) 3,000 units tablet, Take 2 tablets (6,000 Units total) by mouth see administration instructions Take 2 tabs with first bite of dairy as needed  , Disp: 60 tablet, Rfl: 3    levETIRAcetam (KEPPRA) 500 mg tablet, , Disp: , Rfl:     loratadine (CLARITIN) 10 mg tablet, TAKE 1 TABLET BY MOUTH @ 8PM  DR CHANEY, Disp: 30 tablet, Rfl: 5    Multiple Vitamin (TAB-A-ANNELIESE) TABS, Take 1 tablet by mouth daily Take at 8 AM, Disp: 90 tablet, Rfl: 3    Multiple Vitamin (TAB-A-ANNELIESE) TABS, TAKE 1 TABLET BY MOUTH DAILY @ 8AM  DR ETIENNE, Disp: 30 tablet, Rfl: 5    Multiple Vitamins-Minerals (MULTIVITAL PO), Take by mouth, Disp: , Rfl:     PATADAY 0 2 % opth drops, , Disp: , Rfl:     PHENobarbital 64 8 mg tablet, Take 64 8 mg by mouth 2 (two) times a day, Disp: , Rfl:     pyridoxine (VITAMIN B6) 50 mg tablet, Take 50 mg by mouth daily, Disp: , Rfl:     Pyridoxine HCl (VITAMIN B-6) 50 MG TABS, Take by mouth, Disp: , Rfl:     ranitidine (ZANTAC) 150 mg tablet, Take 1 tablet (150 mg total) by mouth 2 (two) times a day Take at 8 AM and 8 PM, Disp: 60 tablet, Rfl: 3    ranitidine (ZANTAC) 150 mg tablet, TAKE 1 TABLET BY MOUTH DAILY @ 8AM AND @ 8PM, Disp: 60 tablet, Rfl: 5    Vitamin D, Cholecalciferol, 400 units TABS, Take 2 tablets (800 Units total) by mouth every morning Take 2 tab daily at 8AM, Disp: 60 tablet, Rfl: 3     Surgical History   No past surgical history on file              Allergies   Allergen Reactions    Erythromycin      Lactose Intolerance (Gi)      Nabumetone      Penicillins               Patient Active Problem List   Diagnosis    Atherosclerosis of arteries of extremities (HCC)    Callus    Deformity of ankle and foot, acquired    Hammer toes of both feet    Pain in both feet    Systemic lupus erythematosus (HCC)         Review of Systems   Constitutional: Negative  HENT: Negative  Eyes: Negative  Respiratory: Negative  Cardiovascular: Negative  Gastrointestinal: Negative  Endocrine: Negative  Genitourinary: Negative  Musculoskeletal: Positive for arthralgias  Skin: Negative  Allergic/Immunologic: Negative  Neurological: Negative  Hematological: Negative      Psychiatric/Behavioral: Negative            Objective:  Patient's shoes and socks were removed, feet examined       /76   Pulse 80   Resp 17   Ht 5' 3" (1 6 m)   Wt 78 kg (172 lb)   BMI 30 47 kg/m²         Foot Exam    Q, 9 findings bilateral   Abnormal cooling noted bilateral   Negative digital hair of digits     General  General Appearance: appears stated age and healthy         Right Foot/Ankle      Inspection and Palpation  Arch: pes planus  Hammertoes: second toe, fifth toe, fourth toe and third toe  Hallux valgus: yes  Skin Exam: callus, dry skin and skin changes;      Neurovascular  Dorsalis pedis: 1+  Posterior tibial: 1+  Achilles reflex: 1+  Babinski reflex: 1+        Left Foot/Ankle       Inspection and Palpation  Arch: pes planus  Hammertoes: second toe, fifth toe, fourth toe and third toe  Hallux valgus: yes  Skin Exam: callus, dry skin and skin changes;      Neurovascular  Dorsalis pedis: 1+  Posterior tibial: 1+  Achilles reflex: 1+  Babinski reflex: 1+              Right Foot/Ankle   Right Foot Inspection  Skin Exam: dry skin, callus and callus                                 Vascular     The right DP pulse is 1+  The right PT pulse is 1+  Right Toe  - Comprehensive Exam  Arch: pes planus  Hammertoes: second toe, fifth toe, fourth toe and third toe  Hallux valgus: yes     Left Foot/Ankle  Left Foot Inspection  Skin Exam: dry skin and callus                                             Vascular     The left DP pulse is 1+  The left PT pulse is 1+  Left Toe  - Comprehensive Exam  Arch: pes planus  Hammertoes: second toe, fifth toe, fourth toe and third toe  Hallux valgus: yes          Physical Exam   Cardiovascular:   Pulses:       Dorsalis pedis pulses are 1+ on the right side, and 1+ on the left side  Posterior tibial pulses are 1+ on the right side, and 1+ on the left side  Feet:   Right Foot:   Skin Integrity: Positive for callus and dry skin  Left Foot:   Skin Integrity: Positive for callus and dry skin     Neurological:   Reflex Scores: Achilles reflexes are 1+ on the right side and 1+ on the left side          Rigidly contracted hammertoes 2 through 5 bilateral   Painful hyperkeratotic lesion distal right 3rd digit   Painful hyperkeratotic lesion the distal medial aspect of the left hallux    No open wound or signs of infection  Skin hairless and atrophic vascular changes noted bilateral  Significant bunion hammertoes bilateral   dystrophic elongated thickened discolored toenails x10 with subungal debris , painful on palpation   Plus one edema bilateral feet and ankles  Hyperpigmentation lower legs bilateral  Venous stasis bilateral

## 2020-03-11 ENCOUNTER — OFFICE VISIT (OUTPATIENT)
Dept: AUDIOLOGY | Facility: CLINIC | Age: 70
End: 2020-03-11
Payer: COMMERCIAL

## 2020-03-11 DIAGNOSIS — H90.3 SENSORINEURAL HEARING LOSS, BILATERAL: Primary | ICD-10-CM

## 2020-03-11 PROCEDURE — V5261 HEARING AID, DIGIT, BIN, BTE: HCPCS | Performed by: AUDIOLOGIST

## 2020-03-11 PROCEDURE — V5160 DISPENSING FEE BINAURAL: HCPCS | Performed by: AUDIOLOGIST

## 2020-03-11 NOTE — LETTER
2020     Garima Arnold MD  [address]    Patient: Beth Mares   YOB: 1950   Date of Visit: 3/11/2020       Dear Dr Chelo Rdz:    Raquel Feliz was seen today to be fit with new hearing aids  It has been a number of years that Argelia Yeager has been advised that she could benefit from amplification, and she declined in the past, so I am excited to be assisting her and her caregivers now  Below are my notes for this consultation  If you have questions, please do not hesitate to call me  I look forward to following your patient along with you  Sincerely,        Meenakshi Hernandez ,CCC/A  Clinical Audiologist   [place] [id number]        CC: No Recipients  Sharyn Lewis  3/13/2020  2:13 PM  Sign at close encounter       Hearing Aid Fitting    Name:  Beth Mares  :  1950  Age:  71 y o  Date of Evaluation: 3/11/2020    Beth Mares is being see today to be fit with new hearing aids via [facility] insurance  The patient was accompanied by Marsha Veloz, a caregiver from [facility] group home  Patient is fit with Oticon OPN 3 minirite hearing aid(s)  Right serial number [id number]  Left serial number [id number]  Warranty date: 3/30/2022 (Loss/Damage and repair)  Ear mold Battery  Dome   Right N/A 312 # 2 60 w/lock 6 mm open   Left N/A 312 # 2 60 w/lock 6 mm open     The hearing aid(s) were adjusted based on the patient's most recent audiogram and patient comfort  Patient appeared satisfied with the initial settings and was observed to engage more easily in conversation after they were adjusted to step 2, up in the mids frequencies  See all documents attached for discussions of wear time and maintenance  Pradeep Mera was shown how to place the devices  Should Argelia Yeager show compliance with the devices, we will attempt to teach her to place the devices on her own         Recommendation:   Follow up scheduled for 3/25/2020 at 2:00    Sharyn Lewis Meenakshi COHN , [facility], [place]# [id number], Hearing Aid Dispenser, [place]# [id number]  Clinical Audiologist

## 2020-03-13 NOTE — PROGRESS NOTES
Hearing Aid Fitting    Name:  Chanel Griggs  :  1950  Age:  71 y o  Date of Evaluation: 3/11/2020    Chanel Griggs is being see today to be fit with new hearing aids via [facility], [facility] insurance  The patient was accompanied by Kaur Og, a caregiver from [facility] group home  Patient is fit with Oticon OPN 3 minirite hearing aid(s)  Right serial number [id number]  Left serial number [id number]  Warranty date: 3/30/2022 (Loss/Damage and repair)  Ear mold Battery  Dome   Right N/A 312 # 2 60 w/lock 6 mm open   Left N/A 312 # 2 60 w/lock 6 mm open     The hearing aid(s) were adjusted based on the patient's most recent audiogram and patient comfort  Patient appeared satisfied with the initial settings and was observed to engage more easily in conversation after they were adjusted to step 2, up in the mids frequencies  See all documents attached for discussions of wear time and maintenance  Oneta Race was shown how to place the devices  Should [place] Shutter show compliance with the devices, we will attempt to teach her to place the devices on her own         Recommendation:   Follow up scheduled for 3/25/2020 at 2:00    Meenakshi Mcarthur , CCC-A, [place]# [id number], Hearing Aid Dispenser, [place]# [id number]  Clinical Audiologist

## 2020-06-18 ENCOUNTER — OFFICE VISIT (OUTPATIENT)
Dept: PODIATRY | Facility: CLINIC | Age: 70
End: 2020-06-18
Payer: MEDICARE

## 2020-06-18 DIAGNOSIS — B35.1 ONYCHOMYCOSIS: ICD-10-CM

## 2020-06-18 DIAGNOSIS — M79.671 PAIN IN BOTH FEET: ICD-10-CM

## 2020-06-18 DIAGNOSIS — B35.3 TINEA PEDIS OF BOTH FEET: Primary | ICD-10-CM

## 2020-06-18 DIAGNOSIS — I70.209 ATHEROSCLEROSIS OF ARTERIES OF EXTREMITIES (HCC): ICD-10-CM

## 2020-06-18 DIAGNOSIS — M21.961 DEFORMITY OF BOTH FEET: ICD-10-CM

## 2020-06-18 DIAGNOSIS — R26.2 DIFFICULTY IN WALKING INVOLVING ANKLE AND FOOT JOINT: ICD-10-CM

## 2020-06-18 DIAGNOSIS — M21.962 DEFORMITY OF BOTH FEET: ICD-10-CM

## 2020-06-18 DIAGNOSIS — M79.672 PAIN IN BOTH FEET: ICD-10-CM

## 2020-06-18 PROCEDURE — 99213 OFFICE O/P EST LOW 20 MIN: CPT | Performed by: PODIATRIST

## 2020-06-18 RX ORDER — FAMOTIDINE 20 MG/1
20 TABLET, FILM COATED ORAL 2 TIMES DAILY
COMMUNITY
Start: 2020-05-29

## 2020-06-18 RX ORDER — NYSTATIN 100000 [USP'U]/G
POWDER TOPICAL DAILY
Qty: 60 G | Refills: 0 | Status: SHIPPED | OUTPATIENT
Start: 2020-06-18 | End: 2020-07-09

## 2020-07-03 ENCOUNTER — TELEMEDICINE (OUTPATIENT)
Dept: PODIATRY | Facility: CLINIC | Age: 70
End: 2020-07-03
Payer: MEDICARE

## 2020-07-03 DIAGNOSIS — B35.3 TINEA PEDIS OF BOTH FEET: Primary | ICD-10-CM

## 2020-07-03 PROCEDURE — 99212 OFFICE O/P EST SF 10 MIN: CPT | Performed by: PODIATRIST

## 2020-07-03 NOTE — PROGRESS NOTES
Tele visit, patient was evaluated using doximity application, patient verified using name and date of birth, spoke with the patient aid, patient has been applying nystatin powder for the past 2 months, report improvement and maceration between her toes, patient denies constitutional symptoms, patient denies trauma to the foot  Explained the importance of foot hygiene, and dry between the toes after shower, change socks daily, use OTC powder,  Discontinue nystatin, patient to be evaluated at office

## 2020-07-06 DIAGNOSIS — B35.3 TINEA PEDIS OF BOTH FEET: ICD-10-CM

## 2020-07-09 ENCOUNTER — TRANSCRIBE ORDERS (OUTPATIENT)
Dept: LAB | Facility: CLINIC | Age: 70
End: 2020-07-09

## 2020-07-09 ENCOUNTER — APPOINTMENT (OUTPATIENT)
Dept: LAB | Facility: CLINIC | Age: 70
End: 2020-07-09
Payer: MEDICARE

## 2020-07-09 DIAGNOSIS — N60.01 SOLITARY BENIGN CYST OF RIGHT BREAST: ICD-10-CM

## 2020-07-09 DIAGNOSIS — M40.209 ACQUIRED HUNCHBACK: ICD-10-CM

## 2020-07-09 DIAGNOSIS — M19.90 SENILE ARTHRITIS: ICD-10-CM

## 2020-07-09 DIAGNOSIS — M32.9 SYSTEMIC LUPUS ERYTHEMATOSUS, UNSPECIFIED SLE TYPE, UNSPECIFIED ORGAN INVOLVEMENT STATUS (HCC): ICD-10-CM

## 2020-07-09 DIAGNOSIS — I87.2 PERIPHERAL VENOUS INSUFFICIENCY: ICD-10-CM

## 2020-07-09 DIAGNOSIS — D70.9 CHRONIC IDIOPATHIC NEUTROPENIA (HCC): ICD-10-CM

## 2020-07-09 DIAGNOSIS — I87.2 PERIPHERAL VENOUS INSUFFICIENCY: Primary | ICD-10-CM

## 2020-07-09 LAB
ALBUMIN SERPL BCP-MCNC: 4 G/DL (ref 3.5–5)
ALP SERPL-CCNC: 75 U/L (ref 46–116)
ALT SERPL W P-5'-P-CCNC: 24 U/L (ref 12–78)
ANION GAP SERPL CALCULATED.3IONS-SCNC: 2 MMOL/L (ref 4–13)
AST SERPL W P-5'-P-CCNC: 34 U/L (ref 5–45)
BASOPHILS # BLD AUTO: 0.06 THOUSANDS/ΜL (ref 0–0.1)
BASOPHILS NFR BLD AUTO: 2 % (ref 0–1)
BILIRUB SERPL-MCNC: 0.34 MG/DL (ref 0.2–1)
BUN SERPL-MCNC: 12 MG/DL (ref 5–25)
CALCIUM SERPL-MCNC: 9.3 MG/DL (ref 8.3–10.1)
CHLORIDE SERPL-SCNC: 103 MMOL/L (ref 100–108)
CO2 SERPL-SCNC: 34 MMOL/L (ref 21–32)
CREAT SERPL-MCNC: 0.84 MG/DL (ref 0.6–1.3)
EOSINOPHIL # BLD AUTO: 0 THOUSAND/ΜL (ref 0–0.61)
EOSINOPHIL NFR BLD AUTO: 0 % (ref 0–6)
ERYTHROCYTE [DISTWIDTH] IN BLOOD BY AUTOMATED COUNT: 13.2 % (ref 11.6–15.1)
GFR SERPL CREATININE-BSD FRML MDRD: 82 ML/MIN/1.73SQ M
GLUCOSE P FAST SERPL-MCNC: 76 MG/DL (ref 65–99)
HCT VFR BLD AUTO: 37.2 % (ref 34.8–46.1)
HGB BLD-MCNC: 11.9 G/DL (ref 11.5–15.4)
IMM GRANULOCYTES # BLD AUTO: 0.01 THOUSAND/UL (ref 0–0.2)
IMM GRANULOCYTES NFR BLD AUTO: 0 % (ref 0–2)
LYMPHOCYTES # BLD AUTO: 0.89 THOUSANDS/ΜL (ref 0.6–4.47)
LYMPHOCYTES NFR BLD AUTO: 22 % (ref 14–44)
MCH RBC QN AUTO: 29.6 PG (ref 26.8–34.3)
MCHC RBC AUTO-ENTMCNC: 32 G/DL (ref 31.4–37.4)
MCV RBC AUTO: 93 FL (ref 82–98)
MONOCYTES # BLD AUTO: 0.41 THOUSAND/ΜL (ref 0.17–1.22)
MONOCYTES NFR BLD AUTO: 10 % (ref 4–12)
NEUTROPHILS # BLD AUTO: 2.69 THOUSANDS/ΜL (ref 1.85–7.62)
NEUTS SEG NFR BLD AUTO: 66 % (ref 43–75)
NRBC BLD AUTO-RTO: 0 /100 WBCS
PLATELET # BLD AUTO: 198 THOUSANDS/UL (ref 149–390)
PMV BLD AUTO: 11.3 FL (ref 8.9–12.7)
POTASSIUM SERPL-SCNC: 4.2 MMOL/L (ref 3.5–5.3)
PROT SERPL-MCNC: 7.7 G/DL (ref 6.4–8.2)
RBC # BLD AUTO: 4.02 MILLION/UL (ref 3.81–5.12)
SODIUM SERPL-SCNC: 139 MMOL/L (ref 136–145)
WBC # BLD AUTO: 4.06 THOUSAND/UL (ref 4.31–10.16)

## 2020-07-09 PROCEDURE — 85025 COMPLETE CBC W/AUTO DIFF WBC: CPT

## 2020-07-09 PROCEDURE — 80053 COMPREHEN METABOLIC PANEL: CPT

## 2020-07-09 PROCEDURE — 36415 COLL VENOUS BLD VENIPUNCTURE: CPT

## 2020-07-09 RX ORDER — NYSTATIN 100000 [USP'U]/G
POWDER TOPICAL
Qty: 60 G | Refills: 0 | Status: SHIPPED | OUTPATIENT
Start: 2020-07-09 | End: 2021-12-29

## 2020-08-17 ENCOUNTER — OFFICE VISIT (OUTPATIENT)
Dept: AUDIOLOGY | Facility: CLINIC | Age: 70
End: 2020-08-17

## 2020-08-17 DIAGNOSIS — H90.5 SENSORY HEARING LOSS: Primary | ICD-10-CM

## 2020-08-17 NOTE — PROGRESS NOTES
Progress Note    Name:  Stefany Sandoval  :  1950  Age:  71 y o  Date of Evaluation: 20     Patient's aid dropped off hearing aids with the complaint they are not working  Patient is fit with Oticon OPN 3 minirite hearing aid(s)  Right serial number [id number]  Left serial number [id number]  Warranty date: 3/30/2022 (Loss/Damage and repair)  Actions: Both hearing aids were cleaned and checked  Domes and wax guards were changed  Listening check indicated both hearing aids to be in good working condition and appropriate for her hearing loss  No charge - patient under warranty  Bhargav Manley (aid) was contacted to  hearing aids at her convenience       Meenakshi Alexandra , CCC-A  Clinical Audiologist

## 2020-08-31 ENCOUNTER — OFFICE VISIT (OUTPATIENT)
Dept: AUDIOLOGY | Facility: CLINIC | Age: 70
End: 2020-08-31

## 2020-08-31 DIAGNOSIS — H90.5 SENSORY HEARING LOSS: Primary | ICD-10-CM

## 2020-08-31 NOTE — PROGRESS NOTES
Hearing Aid Visit:    Name:  Emi Olmos  :  1950  Age:  79 y o  Date of Evaluation: 20     Emi Olmos is being seen for a hearing aid visit  This is an initial follow-up to fitting  She was fit a week prior to Covid shutdown (April)    Patient is fit with Oticon OPN 3 minirite hearing aid(s)  Right serial number [id number]  Left serial number [id number]  Warranty date: 3/30/2022 (Loss/Damage and repair)  Patient reports the left aid has been "dead"  The aids were cleaned and checked a few weeks ago by Tariq Huerta (see note)    Action:  1  Connected both aids to computer to check programming  She is at step 3; initially reporting the Left aid was "lower"  I turned up overall by 1 click and this made the hearing aids more "equal" to her  2 clicks were too much as the left was then louder than the right  Discussed with caregiver Yoly Mireles      2  Went over changing of dome and wax guards ; provided a pack to the caregiver and advised to check her original packaging to see if there is more  3  Had patient demonstrate to me insertion and removal of the devices  She is getting them in well at about the same speed per aid  She removes well with the wire and not the aid itself  Recommendations:   1  RTO scheduled for 2021 (4/5 month)  Caregiver advised to call office if the left aid seems to continue having issues         Meenakshi Mackenzie , CCC-A, [place]# [id number], Hearing Aid Dispenser, [place]# [id number]  Clinical Audiologist

## 2020-09-10 ENCOUNTER — OFFICE VISIT (OUTPATIENT)
Dept: PODIATRY | Facility: CLINIC | Age: 70
End: 2020-09-10
Payer: MEDICARE

## 2020-09-10 VITALS — BODY MASS INDEX: 30.48 KG/M2 | WEIGHT: 172 LBS | RESPIRATION RATE: 17 BRPM | HEIGHT: 63 IN

## 2020-09-10 DIAGNOSIS — M79.671 PAIN IN BOTH FEET: ICD-10-CM

## 2020-09-10 DIAGNOSIS — R26.2 DIFFICULTY IN WALKING INVOLVING ANKLE AND FOOT JOINT: ICD-10-CM

## 2020-09-10 DIAGNOSIS — S90.414A ABRASION, RIGHT LESSER TOE(S), INITIAL ENCOUNTER: Primary | ICD-10-CM

## 2020-09-10 DIAGNOSIS — M79.672 PAIN IN BOTH FEET: ICD-10-CM

## 2020-09-10 DIAGNOSIS — B35.1 ONYCHOMYCOSIS: ICD-10-CM

## 2020-09-10 DIAGNOSIS — I70.209 ATHEROSCLEROSIS OF ARTERIES OF EXTREMITIES (HCC): ICD-10-CM

## 2020-09-10 DIAGNOSIS — B35.3 TINEA PEDIS OF BOTH FEET: ICD-10-CM

## 2020-09-10 PROCEDURE — 97597 DBRDMT OPN WND 1ST 20 CM/<: CPT | Performed by: PODIATRIST

## 2020-10-14 ENCOUNTER — APPOINTMENT (OUTPATIENT)
Dept: LAB | Facility: CLINIC | Age: 70
End: 2020-10-14
Payer: MEDICARE

## 2020-10-14 ENCOUNTER — TRANSCRIBE ORDERS (OUTPATIENT)
Dept: LAB | Facility: CLINIC | Age: 70
End: 2020-10-14

## 2020-10-14 DIAGNOSIS — Z51.11 ENCOUNTER FOR ANTINEOPLASTIC CHEMOTHERAPY: ICD-10-CM

## 2020-10-14 DIAGNOSIS — R53.83 FATIGUE, UNSPECIFIED TYPE: ICD-10-CM

## 2020-10-14 DIAGNOSIS — M32.10 SYSTEMIC LUPUS ERYTHEMATOSUS WITH ORGAN SYSTEM INVOLVEMENT (HCC): Primary | ICD-10-CM

## 2020-10-14 DIAGNOSIS — M32.10 SYSTEMIC LUPUS ERYTHEMATOSUS, ORGAN OR SYSTEM INVOLVEMENT UNSPECIFIED (HCC): Primary | ICD-10-CM

## 2020-10-14 LAB
ALBUMIN SERPL BCP-MCNC: 4.4 G/DL (ref 3.5–5)
ALP SERPL-CCNC: 77 U/L (ref 46–116)
ALT SERPL W P-5'-P-CCNC: 23 U/L (ref 12–78)
ANION GAP SERPL CALCULATED.3IONS-SCNC: 2 MMOL/L (ref 4–13)
AST SERPL W P-5'-P-CCNC: 32 U/L (ref 5–45)
BACTERIA UR QL AUTO: ABNORMAL /HPF
BASOPHILS # BLD AUTO: 0.06 THOUSANDS/ΜL (ref 0–0.1)
BASOPHILS NFR BLD AUTO: 2 % (ref 0–1)
BILIRUB SERPL-MCNC: 0.4 MG/DL (ref 0.2–1)
BILIRUB UR QL STRIP: NEGATIVE
BUN SERPL-MCNC: 13 MG/DL (ref 5–25)
C3 SERPL-MCNC: 113 MG/DL (ref 90–180)
C4 SERPL-MCNC: 19 MG/DL (ref 10–40)
CALCIUM SERPL-MCNC: 8.7 MG/DL (ref 8.3–10.1)
CHLORIDE SERPL-SCNC: 103 MMOL/L (ref 100–108)
CLARITY UR: ABNORMAL
CO2 SERPL-SCNC: 32 MMOL/L (ref 21–32)
COLOR UR: YELLOW
CREAT SERPL-MCNC: 0.77 MG/DL (ref 0.6–1.3)
CREAT UR-MCNC: 78.1 MG/DL
EOSINOPHIL # BLD AUTO: 0 THOUSAND/ΜL (ref 0–0.61)
EOSINOPHIL NFR BLD AUTO: 0 % (ref 0–6)
ERYTHROCYTE [DISTWIDTH] IN BLOOD BY AUTOMATED COUNT: 13.2 % (ref 11.6–15.1)
ERYTHROCYTE [SEDIMENTATION RATE] IN BLOOD: 9 MM/HOUR (ref 0–29)
GFR SERPL CREATININE-BSD FRML MDRD: 90 ML/MIN/1.73SQ M
GLUCOSE P FAST SERPL-MCNC: 78 MG/DL (ref 65–99)
GLUCOSE UR STRIP-MCNC: NEGATIVE MG/DL
HCT VFR BLD AUTO: 37.3 % (ref 34.8–46.1)
HGB BLD-MCNC: 12 G/DL (ref 11.5–15.4)
HGB UR QL STRIP.AUTO: NEGATIVE
HYALINE CASTS #/AREA URNS LPF: ABNORMAL /LPF
IMM GRANULOCYTES # BLD AUTO: 0 THOUSAND/UL (ref 0–0.2)
IMM GRANULOCYTES NFR BLD AUTO: 0 % (ref 0–2)
KETONES UR STRIP-MCNC: NEGATIVE MG/DL
LEUKOCYTE ESTERASE UR QL STRIP: ABNORMAL
LYMPHOCYTES # BLD AUTO: 0.85 THOUSANDS/ΜL (ref 0.6–4.47)
LYMPHOCYTES NFR BLD AUTO: 27 % (ref 14–44)
MCH RBC QN AUTO: 30.3 PG (ref 26.8–34.3)
MCHC RBC AUTO-ENTMCNC: 32.2 G/DL (ref 31.4–37.4)
MCV RBC AUTO: 94 FL (ref 82–98)
MONOCYTES # BLD AUTO: 0.4 THOUSAND/ΜL (ref 0.17–1.22)
MONOCYTES NFR BLD AUTO: 13 % (ref 4–12)
NEUTROPHILS # BLD AUTO: 1.83 THOUSANDS/ΜL (ref 1.85–7.62)
NEUTS SEG NFR BLD AUTO: 58 % (ref 43–75)
NITRITE UR QL STRIP: NEGATIVE
NON-SQ EPI CELLS URNS QL MICRO: ABNORMAL /HPF
NRBC BLD AUTO-RTO: 0 /100 WBCS
PH UR STRIP.AUTO: 6.5 [PH]
PLATELET # BLD AUTO: 194 THOUSANDS/UL (ref 149–390)
PMV BLD AUTO: 11.2 FL (ref 8.9–12.7)
POTASSIUM SERPL-SCNC: 3.9 MMOL/L (ref 3.5–5.3)
PROT SERPL-MCNC: 8 G/DL (ref 6.4–8.2)
PROT UR STRIP-MCNC: NEGATIVE MG/DL
PROT UR-MCNC: 10 MG/DL
PROT/CREAT UR: 0.13 MG/G{CREAT} (ref 0–0.1)
RBC # BLD AUTO: 3.96 MILLION/UL (ref 3.81–5.12)
RBC #/AREA URNS AUTO: ABNORMAL /HPF
SODIUM SERPL-SCNC: 137 MMOL/L (ref 136–145)
SP GR UR STRIP.AUTO: 1.01 (ref 1–1.03)
UROBILINOGEN UR QL STRIP.AUTO: 0.2 E.U./DL
WBC # BLD AUTO: 3.14 THOUSAND/UL (ref 4.31–10.16)
WBC #/AREA URNS AUTO: ABNORMAL /HPF

## 2020-10-14 PROCEDURE — 85025 COMPLETE CBC W/AUTO DIFF WBC: CPT | Performed by: INTERNAL MEDICINE

## 2020-10-14 PROCEDURE — 86038 ANTINUCLEAR ANTIBODIES: CPT

## 2020-10-14 PROCEDURE — 36415 COLL VENOUS BLD VENIPUNCTURE: CPT | Performed by: INTERNAL MEDICINE

## 2020-10-14 PROCEDURE — 86160 COMPLEMENT ANTIGEN: CPT

## 2020-10-14 PROCEDURE — 82570 ASSAY OF URINE CREATININE: CPT | Performed by: INTERNAL MEDICINE

## 2020-10-14 PROCEDURE — 84156 ASSAY OF PROTEIN URINE: CPT | Performed by: INTERNAL MEDICINE

## 2020-10-14 PROCEDURE — 85652 RBC SED RATE AUTOMATED: CPT | Performed by: INTERNAL MEDICINE

## 2020-10-14 PROCEDURE — 81001 URINALYSIS AUTO W/SCOPE: CPT | Performed by: INTERNAL MEDICINE

## 2020-10-14 PROCEDURE — 86225 DNA ANTIBODY NATIVE: CPT

## 2020-10-14 PROCEDURE — 80053 COMPREHEN METABOLIC PANEL: CPT | Performed by: INTERNAL MEDICINE

## 2020-10-15 LAB — DSDNA AB SER-ACNC: 1 IU/ML (ref 0–9)

## 2020-10-16 LAB — RYE IGE QN: NEGATIVE

## 2020-11-19 ENCOUNTER — OFFICE VISIT (OUTPATIENT)
Dept: PODIATRY | Facility: CLINIC | Age: 70
End: 2020-11-19
Payer: MEDICARE

## 2020-11-19 VITALS
DIASTOLIC BLOOD PRESSURE: 77 MMHG | HEIGHT: 63 IN | RESPIRATION RATE: 17 BRPM | SYSTOLIC BLOOD PRESSURE: 128 MMHG | WEIGHT: 172 LBS | BODY MASS INDEX: 30.48 KG/M2

## 2020-11-19 DIAGNOSIS — M79.671 PAIN IN BOTH FEET: ICD-10-CM

## 2020-11-19 DIAGNOSIS — M21.962 DEFORMITY OF BOTH FEET: ICD-10-CM

## 2020-11-19 DIAGNOSIS — B35.3 TINEA PEDIS OF BOTH FEET: ICD-10-CM

## 2020-11-19 DIAGNOSIS — L84 CALLUS: ICD-10-CM

## 2020-11-19 DIAGNOSIS — R26.2 DIFFICULTY IN WALKING INVOLVING ANKLE AND FOOT JOINT: ICD-10-CM

## 2020-11-19 DIAGNOSIS — M79.672 PAIN IN BOTH FEET: ICD-10-CM

## 2020-11-19 DIAGNOSIS — B35.1 ONYCHOMYCOSIS: Primary | ICD-10-CM

## 2020-11-19 DIAGNOSIS — I70.209 ATHEROSCLEROSIS OF ARTERIES OF EXTREMITIES (HCC): ICD-10-CM

## 2020-11-19 DIAGNOSIS — M21.961 DEFORMITY OF BOTH FEET: ICD-10-CM

## 2020-11-19 PROCEDURE — 99212 OFFICE O/P EST SF 10 MIN: CPT | Performed by: PODIATRIST

## 2021-01-04 ENCOUNTER — OFFICE VISIT (OUTPATIENT)
Dept: AUDIOLOGY | Facility: CLINIC | Age: 71
End: 2021-01-04

## 2021-01-04 DIAGNOSIS — H90.5 SENSORY HEARING LOSS: Primary | ICD-10-CM

## 2021-01-04 NOTE — PROGRESS NOTES
Hearing Aid Visit:    Name:  Elmer Ornelas  :  1950  Age:  79 y o  Date of Evaluation: 21     Elmer Ornelas is being seen for a scheduled hearing aid visit  Patient is fit with OtFlexenclosure OPN 3 minirite hearing aid(s)  Right serial number [id number]  Left serial number [id number]  Warranty date: 3/30/2022 (Loss/Damage and repair)  Patient reports no issues with the devices  She described issues with TV volume when her house mate turns the volume "too low"  Her caregiver, Sara Wang, stated this is an issue within the house, not necessarily the hearing aids  Action:  1  Cleaned and checked both aids  Changed dome and wax guard on right aid  Left aid was clear  2  Removed minimal wax from the right canal   Left was clear  Recommendations:   1  RTO 2021 at 3:30 for HA service     Schedule for October or after for 2 year audiogram        Meenakshi Lock , ELIESER-A, [place]# [id number], Hearing Aid Dispenser, [place]# [id number]  Clinical Audiologist

## 2021-01-06 ENCOUNTER — APPOINTMENT (OUTPATIENT)
Dept: LAB | Facility: CLINIC | Age: 71
End: 2021-01-06
Payer: MEDICARE

## 2021-01-06 ENCOUNTER — TRANSCRIBE ORDERS (OUTPATIENT)
Dept: LAB | Facility: CLINIC | Age: 71
End: 2021-01-06

## 2021-01-06 DIAGNOSIS — M32.19 SYSTEMIC LUPUS ERYTHEMATOSUS WITH OTHER ORGAN INVOLVEMENT, UNSPECIFIED SLE TYPE (HCC): Primary | ICD-10-CM

## 2021-01-06 LAB
ALBUMIN SERPL BCP-MCNC: 4.4 G/DL (ref 3.5–5)
ALP SERPL-CCNC: 70 U/L (ref 46–116)
ALT SERPL W P-5'-P-CCNC: 22 U/L (ref 12–78)
ANION GAP SERPL CALCULATED.3IONS-SCNC: 2 MMOL/L (ref 4–13)
AST SERPL W P-5'-P-CCNC: 31 U/L (ref 5–45)
BASOPHILS # BLD AUTO: 0.07 THOUSANDS/ΜL (ref 0–0.1)
BASOPHILS NFR BLD AUTO: 2 % (ref 0–1)
BILIRUB SERPL-MCNC: 0.41 MG/DL (ref 0.2–1)
BUN SERPL-MCNC: 13 MG/DL (ref 5–25)
CALCIUM SERPL-MCNC: 9.3 MG/DL (ref 8.3–10.1)
CHLORIDE SERPL-SCNC: 104 MMOL/L (ref 100–108)
CO2 SERPL-SCNC: 33 MMOL/L (ref 21–32)
CREAT SERPL-MCNC: 0.84 MG/DL (ref 0.6–1.3)
EOSINOPHIL # BLD AUTO: 0 THOUSAND/ΜL (ref 0–0.61)
EOSINOPHIL NFR BLD AUTO: 0 % (ref 0–6)
ERYTHROCYTE [DISTWIDTH] IN BLOOD BY AUTOMATED COUNT: 13.3 % (ref 11.6–15.1)
GFR SERPL CREATININE-BSD FRML MDRD: 81 ML/MIN/1.73SQ M
GLUCOSE P FAST SERPL-MCNC: 78 MG/DL (ref 65–99)
HCT VFR BLD AUTO: 39.4 % (ref 34.8–46.1)
HGB BLD-MCNC: 12.2 G/DL (ref 11.5–15.4)
IMM GRANULOCYTES # BLD AUTO: 0.01 THOUSAND/UL (ref 0–0.2)
IMM GRANULOCYTES NFR BLD AUTO: 0 % (ref 0–2)
LYMPHOCYTES # BLD AUTO: 1.33 THOUSANDS/ΜL (ref 0.6–4.47)
LYMPHOCYTES NFR BLD AUTO: 34 % (ref 14–44)
MCH RBC QN AUTO: 29.1 PG (ref 26.8–34.3)
MCHC RBC AUTO-ENTMCNC: 31 G/DL (ref 31.4–37.4)
MCV RBC AUTO: 94 FL (ref 82–98)
MONOCYTES # BLD AUTO: 0.55 THOUSAND/ΜL (ref 0.17–1.22)
MONOCYTES NFR BLD AUTO: 14 % (ref 4–12)
NEUTROPHILS # BLD AUTO: 1.99 THOUSANDS/ΜL (ref 1.85–7.62)
NEUTS SEG NFR BLD AUTO: 50 % (ref 43–75)
NRBC BLD AUTO-RTO: 0 /100 WBCS
PLATELET # BLD AUTO: 204 THOUSANDS/UL (ref 149–390)
PMV BLD AUTO: 11.4 FL (ref 8.9–12.7)
POTASSIUM SERPL-SCNC: 3.8 MMOL/L (ref 3.5–5.3)
PROT SERPL-MCNC: 7.6 G/DL (ref 6.4–8.2)
RBC # BLD AUTO: 4.19 MILLION/UL (ref 3.81–5.12)
SODIUM SERPL-SCNC: 139 MMOL/L (ref 136–145)
WBC # BLD AUTO: 3.95 THOUSAND/UL (ref 4.31–10.16)

## 2021-01-06 PROCEDURE — 85025 COMPLETE CBC W/AUTO DIFF WBC: CPT | Performed by: INTERNAL MEDICINE

## 2021-01-06 PROCEDURE — 80053 COMPREHEN METABOLIC PANEL: CPT | Performed by: INTERNAL MEDICINE

## 2021-01-06 PROCEDURE — 36415 COLL VENOUS BLD VENIPUNCTURE: CPT | Performed by: INTERNAL MEDICINE

## 2021-01-28 ENCOUNTER — OFFICE VISIT (OUTPATIENT)
Dept: PODIATRY | Facility: CLINIC | Age: 71
End: 2021-01-28
Payer: MEDICARE

## 2021-01-28 VITALS
SYSTOLIC BLOOD PRESSURE: 128 MMHG | DIASTOLIC BLOOD PRESSURE: 77 MMHG | HEIGHT: 63 IN | WEIGHT: 172 LBS | RESPIRATION RATE: 17 BRPM | BODY MASS INDEX: 30.48 KG/M2

## 2021-01-28 DIAGNOSIS — I70.209 ATHEROSCLEROSIS OF ARTERIES OF EXTREMITIES (HCC): ICD-10-CM

## 2021-01-28 DIAGNOSIS — M79.672 PAIN IN BOTH FEET: ICD-10-CM

## 2021-01-28 DIAGNOSIS — B35.1 ONYCHOMYCOSIS: Primary | ICD-10-CM

## 2021-01-28 DIAGNOSIS — M79.671 PAIN IN BOTH FEET: ICD-10-CM

## 2021-01-28 DIAGNOSIS — L84 CALLUS: ICD-10-CM

## 2021-01-28 DIAGNOSIS — M21.961 DEFORMITY OF BOTH FEET: ICD-10-CM

## 2021-01-28 DIAGNOSIS — B35.3 TINEA PEDIS OF BOTH FEET: ICD-10-CM

## 2021-01-28 DIAGNOSIS — M21.962 DEFORMITY OF BOTH FEET: ICD-10-CM

## 2021-01-28 DIAGNOSIS — R26.2 DIFFICULTY IN WALKING INVOLVING ANKLE AND FOOT JOINT: ICD-10-CM

## 2021-01-28 PROCEDURE — 99212 OFFICE O/P EST SF 10 MIN: CPT | Performed by: PODIATRIST

## 2021-02-16 ENCOUNTER — TELEPHONE (OUTPATIENT)
Dept: FAMILY MEDICINE CLINIC | Facility: CLINIC | Age: 71
End: 2021-02-16

## 2021-02-16 NOTE — TELEPHONE ENCOUNTER
Called patient regarding Ophthalmology paperwork received from [facility]  Spoke with patients aid  They confirmed that paperwork must have been seen in error

## 2021-03-31 NOTE — PROGRESS NOTES
Assessment/Plan:    Aseptic debridement of pre-trophic hyperkeratotic lesions ×4 plantar feet bilateral  Debridement with #10 scalpel debrided nonviable tissue down to healthy viable tissue  Aseptic debridement and planning of nails x10 and manually and mechanically  Partial resection of ingrown nail left hallux medial border, debrided hyperkeratotic lesion, applied silver nitrate  Patient will call if any redness or signs of infection     Daily foot checks monitor for signs of infection  Follow-up 3 months         Diagnoses and all orders for this visit:    Onychomycosis    Difficulty in walking involving ankle and foot joint    Tinea pedis of both feet    Deformity of both feet    Pain in both feet    Callus    Atherosclerosis of arteries of extremities (HCC)          Subjective:      Patient ID: Gema Tellez is a 79 y o  female  Patient has painful hyperkeratotic lesions that hurt when walking wearing shoes  Patient has not noticed any redness or signs of infection  No past medical history on file  Current Outpatient Medications:     alendronate (FOSAMAX) 70 mg tablet, Take 70 mg by mouth every 7 days, Disp: , Rfl:     CALCITONIN, SALMON, NA, into each nostril, Disp: , Rfl:     calcium carbonate (OS-YESICA) 600 MG tablet, Take by mouth, Disp: , Rfl:     calcium carbonate (OS-YESICA) 600 MG tablet, Take 1 tablet (600 mg total) by mouth 2 (two) times a day with meals, Disp: 180 tablet, Rfl: 3    calcium carbonate (OS-YESICA) 600 MG tablet, TAKE ONE TABLET BY MOUTH @ 8AM AND @ 8PM, Disp: 180 tablet, Rfl: 3    famotidine (PEPCID) 20 mg tablet, , Disp: , Rfl:     ferrous [id number] C-folic acid (FEROCON) capsule, Take by mouth, Disp: , Rfl:     folic acid (FOLVITE) 1 mg tablet, Take by mouth daily, Disp: , Rfl:     furosemide (LASIX) 20 mg tablet, TAKE ONE TABLET @ 8AM  DR SAMANIEGO, Disp: 30 tablet, Rfl: 3    furosemide (LASIX) 20 mg tablet, TAKE 1 TABLET BY MOUTH DAILY @ 8AM    DANETTE, Disp: 30 tablet, Rfl: 5    hydroxychloroquine (PLAQUENIL) 200 mg tablet, Take 200 mg by mouth 2 (two) times a day with meals, Disp: , Rfl:     lactase (LACTAID) 3,000 units tablet, Take 2 tablets (6,000 Units total) by mouth see administration instructions Take 2 tabs with first bite of dairy as needed  , Disp: 60 tablet, Rfl: 3    levETIRAcetam (KEPPRA) 500 mg tablet, , Disp: , Rfl:     loratadine (CLARITIN) 10 mg tablet, TAKE 1 TABLET BY MOUTH @ 8PM  DR CHANEY, Disp: 30 tablet, Rfl: 5    Multiple Vitamin (TAB-A-ANNELIESE) TABS, Take 1 tablet by mouth daily Take at 8 AM, Disp: 90 tablet, Rfl: 3    Multiple Vitamin (TAB-A-ANNELIESE) TABS, TAKE 1 TABLET BY MOUTH DAILY @ 8AM  DR ETIENEN, Disp: 30 tablet, Rfl: 5    Multiple Vitamins-Minerals (MULTIVITAL PO), Take by mouth, Disp: , Rfl:     NYSTATIN powder, APPLY A SMALL AMOUNT TO AFFECTED AREA AT BEDTIME , Disp: 60 g, Rfl: 0    PATADAY 0 2 % opth drops, , Disp: , Rfl:     PHENobarbital 64 8 mg tablet, Take 64 8 mg by mouth 2 (two) times a day, Disp: , Rfl:     pyridoxine (VITAMIN B6) 50 mg tablet, Take 50 mg by mouth daily, Disp: , Rfl:     Pyridoxine HCl (VITAMIN B-6) 50 MG TABS, Take by mouth, Disp: , Rfl:     Vitamin D, Cholecalciferol, 400 units TABS, Take 2 tablets (800 Units total) by mouth every morning Take 2 tab daily at 8AM, Disp: 60 tablet, Rfl: 3    No past surgical history on file  Allergies   Allergen Reactions    Erythromycin     Lactose Intolerance (Gi) - Food Allergy     Nabumetone     Penicillins        Patient Active Problem List   Diagnosis    Atherosclerosis of arteries of extremities (HCC)    Callus    Deformity of ankle and foot, acquired    Hammer toes of both feet    Pain in both feet    Systemic lupus erythematosus ([address] )       Review of Systems   Constitutional: Negative  Respiratory: Negative  Cardiovascular: Negative  Musculoskeletal: Positive for arthralgias  Skin: Positive for color change and rash  Neurological: Positive for weakness  Objective:  Patient's shoes and socks were removed, feet examined  /77   Resp 17   Ht 5' 3" (1 6 m)   Wt 78 kg (172 lb)   BMI 30 47 kg/m²       Foot Exam    General  General Appearance: appears stated age and healthy       Right Foot/Ankle     Inspection and Palpation  Arch: pes planus  Hammertoes: second toe, fifth toe, fourth toe and third toe  Hallux valgus: yes  Skin Exam: callus, dry skin and skin changes; Neurovascular  Dorsalis pedis: 1+  Posterior tibial: 1+  Achilles reflex: 1+  Babinski reflex: 1+      Left Foot/Ankle      Inspection and Palpation  Arch: pes planus  Hammertoes: second toe, fifth toe, fourth toe and third toe  Hallux valgus: yes  Skin Exam: callus, dry skin and skin changes; Neurovascular  Dorsalis pedis: 1+  Posterior tibial: 1+  Achilles reflex: 1+  Babinski reflex: 1+          Right Foot/Ankle   Right Foot Inspection  Skin Exam: dry skin, callus and callus                              Vascular    The right DP pulse is 1+  The right PT pulse is 1+  Right Toe  - Comprehensive Exam  Arch: pes planus  Hammertoes: second toe, fifth toe, fourth toe and third toe  Hallux valgus: yes    Left Foot/Ankle  Left Foot Inspection  Skin Exam: dry skin and callus                                           Vascular    The left DP pulse is 1+  The left PT pulse is 1+  Left Toe  - Comprehensive Exam  Arch: pes planus  Hammertoes: second toe, fifth toe, fourth toe and third toe  Hallux valgus: yes         Physical Exam  Cardiovascular:      Pulses:           Dorsalis pedis pulses are 1+ on the right side and 1+ on the left side  Posterior tibial pulses are 1+ on the right side and 1+ on the left side  Musculoskeletal:      Right foot: Bunion present  Left foot: Bunion present  Feet:      Right foot:      Skin integrity: Callus and dry skin present  Left foot:      Skin integrity: Callus and dry skin present  Neurological:      Deep Tendon Reflexes:      Reflex Scores:       Achilles reflexes are 1+ on the right side and 1+ on the left side  Rigidly contracted hammertoes 2 through 5 bilateral   Painful hyperkeratotic lesion distal right 3rd digit   Painful hyperkeratotic lesion the distal medial aspect of the left hallux     No open wound or signs of infection  Skin hairless and atrophic vascular changes noted bilateral  Significant bunion hammertoes bilateral    Plus one edema bilateral feet and ankles  Hyperpigmentation lower legs bilateral  Venous stasis bilateral    A bilateral hallucal nails ingrown lateral nail border, onychocryptosis, pain on palpation

## 2021-04-22 ENCOUNTER — OFFICE VISIT (OUTPATIENT)
Dept: PODIATRY | Facility: CLINIC | Age: 71
End: 2021-04-22
Payer: MEDICARE

## 2021-04-22 VITALS
SYSTOLIC BLOOD PRESSURE: 128 MMHG | DIASTOLIC BLOOD PRESSURE: 77 MMHG | HEIGHT: 63 IN | RESPIRATION RATE: 17 BRPM | WEIGHT: 172 LBS | BODY MASS INDEX: 30.48 KG/M2

## 2021-04-22 DIAGNOSIS — R26.2 DIFFICULTY IN WALKING INVOLVING ANKLE AND FOOT JOINT: ICD-10-CM

## 2021-04-22 DIAGNOSIS — B35.1 ONYCHOMYCOSIS: ICD-10-CM

## 2021-04-22 DIAGNOSIS — M21.962 DEFORMITY OF BOTH FEET: ICD-10-CM

## 2021-04-22 DIAGNOSIS — B35.3 TINEA PEDIS OF BOTH FEET: Primary | ICD-10-CM

## 2021-04-22 DIAGNOSIS — M79.671 PAIN IN BOTH FEET: ICD-10-CM

## 2021-04-22 DIAGNOSIS — L60.0 INGROWN TOENAIL: ICD-10-CM

## 2021-04-22 DIAGNOSIS — M21.961 DEFORMITY OF BOTH FEET: ICD-10-CM

## 2021-04-22 DIAGNOSIS — M79.672 PAIN IN BOTH FEET: ICD-10-CM

## 2021-04-22 DIAGNOSIS — L84 CALLUS: ICD-10-CM

## 2021-04-22 PROCEDURE — 99212 OFFICE O/P EST SF 10 MIN: CPT | Performed by: PODIATRIST

## 2021-07-13 ENCOUNTER — APPOINTMENT (OUTPATIENT)
Dept: LAB | Facility: CLINIC | Age: 71
End: 2021-07-13
Payer: MEDICARE

## 2021-07-15 ENCOUNTER — OFFICE VISIT (OUTPATIENT)
Dept: AUDIOLOGY | Facility: CLINIC | Age: 71
End: 2021-07-15

## 2021-07-15 ENCOUNTER — OFFICE VISIT (OUTPATIENT)
Dept: PODIATRY | Facility: CLINIC | Age: 71
End: 2021-07-15
Payer: MEDICARE

## 2021-07-15 VITALS
DIASTOLIC BLOOD PRESSURE: 77 MMHG | WEIGHT: 172 LBS | BODY MASS INDEX: 30.48 KG/M2 | RESPIRATION RATE: 17 BRPM | SYSTOLIC BLOOD PRESSURE: 128 MMHG | HEIGHT: 63 IN

## 2021-07-15 DIAGNOSIS — M79.671 PAIN IN BOTH FEET: Primary | ICD-10-CM

## 2021-07-15 DIAGNOSIS — B35.1 ONYCHOMYCOSIS: ICD-10-CM

## 2021-07-15 DIAGNOSIS — B35.3 TINEA PEDIS OF BOTH FEET: ICD-10-CM

## 2021-07-15 DIAGNOSIS — H90.5 SENSORY HEARING LOSS: Primary | ICD-10-CM

## 2021-07-15 DIAGNOSIS — I70.209 ATHEROSCLEROSIS OF ARTERIES OF EXTREMITIES (HCC): ICD-10-CM

## 2021-07-15 DIAGNOSIS — M21.961 DEFORMITY OF BOTH FEET: ICD-10-CM

## 2021-07-15 DIAGNOSIS — R26.2 DIFFICULTY IN WALKING INVOLVING ANKLE AND FOOT JOINT: ICD-10-CM

## 2021-07-15 DIAGNOSIS — M79.672 PAIN IN BOTH FEET: Primary | ICD-10-CM

## 2021-07-15 DIAGNOSIS — M21.962 DEFORMITY OF BOTH FEET: ICD-10-CM

## 2021-07-15 DIAGNOSIS — L60.0 INGROWN TOENAIL: ICD-10-CM

## 2021-07-15 PROCEDURE — 99212 OFFICE O/P EST SF 10 MIN: CPT | Performed by: PODIATRIST

## 2021-07-15 NOTE — PROGRESS NOTES
Assessment/Plan:    Aseptic debridement of pre-trophic hyperkeratotic lesions ×4 plantar feet bilateral  Debridement with #10 scalpel debrided nonviable tissue down to healthy viable tissue  Aseptic debridement and planning of nails x10 and manually and mechanically  Partial resection of ingrown nail left hallux medial border, debrided hyperkeratotic lesion, applied silver nitrate  Patient will call if any redness or signs of infection     Daily foot checks monitor for signs of infection  Follow-up 3 months         Diagnoses and all orders for this visit:    Tinea pedis of both feet    Difficulty in walking involving ankle and foot joint    Onychomycosis    Deformity of both feet    Pain in both feet    Callus    Ingrown toenail          Subjective:      Patient ID: Remberto Ling is a 79 y o  female  Patient has painful hyperkeratotic lesions that hurt when walking wearing shoes  Patient has not noticed any redness or signs of infection  patient is assisted living facility resident, accompanied by her aide, patient unable to self treat due to behavioral and developmental conditions      No past medical history on file  Current Outpatient Medications:     alendronate (FOSAMAX) 70 mg tablet, Take 70 mg by mouth every 7 days, Disp: , Rfl:     CALCITONIN, SALMON, NA, into each nostril, Disp: , Rfl:     calcium carbonate (OS-YESICA) 600 MG tablet, Take by mouth, Disp: , Rfl:     calcium carbonate (OS-YESICA) 600 MG tablet, Take 1 tablet (600 mg total) by mouth 2 (two) times a day with meals, Disp: 180 tablet, Rfl: 3    calcium carbonate (OS-YESICA) 600 MG tablet, TAKE ONE TABLET BY MOUTH @ 8AM AND @ 8PM, Disp: 180 tablet, Rfl: 3    famotidine (PEPCID) 20 mg tablet, , Disp: , Rfl:     ferrous [id number] C-folic acid (FEROCON) capsule, Take by mouth, Disp: , Rfl:     folic acid (FOLVITE) 1 mg tablet, Take by mouth daily, Disp: , Rfl:     furosemide (LASIX) 20 mg tablet, TAKE ONE TABLET @ 8AM    DANETTE, Disp: 30 tablet, Rfl: 3    furosemide (LASIX) 20 mg tablet, TAKE 1 TABLET BY MOUTH DAILY @ 8AM  DR SAMANIEGO, Disp: 30 tablet, Rfl: 5    hydroxychloroquine (PLAQUENIL) 200 mg tablet, Take 200 mg by mouth 2 (two) times a day with meals, Disp: , Rfl:     lactase (LACTAID) 3,000 units tablet, Take 2 tablets (6,000 Units total) by mouth see administration instructions Take 2 tabs with first bite of dairy as needed  , Disp: 60 tablet, Rfl: 3    levETIRAcetam (KEPPRA) 500 mg tablet, , Disp: , Rfl:     loratadine (CLARITIN) 10 mg tablet, TAKE 1 TABLET BY MOUTH @ 8PM  DR CHANEY, Disp: 30 tablet, Rfl: 5    Multiple Vitamin (TAB-A-ANNELIESE) TABS, Take 1 tablet by mouth daily Take at 8 AM, Disp: 90 tablet, Rfl: 3    Multiple Vitamin (TAB-A-ANNELIESE) TABS, TAKE 1 TABLET BY MOUTH DAILY @ 8AM  DR ETIENNE, Disp: 30 tablet, Rfl: 5    Multiple Vitamins-Minerals (MULTIVITAL PO), Take by mouth, Disp: , Rfl:     NYSTATIN powder, APPLY A SMALL AMOUNT TO AFFECTED AREA AT BEDTIME , Disp: 60 g, Rfl: 0    PATADAY 0 2 % opth drops, , Disp: , Rfl:     PHENobarbital 64 8 mg tablet, Take 64 8 mg by mouth 2 (two) times a day, Disp: , Rfl:     pyridoxine (VITAMIN B6) 50 mg tablet, Take 50 mg by mouth daily, Disp: , Rfl:     Pyridoxine HCl (VITAMIN B-6) 50 MG TABS, Take by mouth, Disp: , Rfl:     Vitamin D, Cholecalciferol, 400 units TABS, Take 2 tablets (800 Units total) by mouth every morning Take 2 tab daily at 8AM, Disp: 60 tablet, Rfl: 3    No past surgical history on file  Allergies   Allergen Reactions    Erythromycin     Lactose Intolerance (Gi) - Food Allergy     Nabumetone     Penicillins        Patient Active Problem List   Diagnosis    Atherosclerosis of arteries of extremities (HCC)    Callus    Deformity of ankle and foot, acquired    Hammer toes of both feet    Pain in both feet    Systemic lupus erythematosus ([address] )       Review of Systems   Constitutional: Negative  Respiratory: Negative  Cardiovascular: Negative  Musculoskeletal: Positive for arthralgias  Skin: Positive for color change and rash  Neurological: Positive for weakness  Objective:  Patient's shoes and socks were removed, feet examined  /77   Resp 17   Ht 5' 3" (1 6 m)   Wt 78 kg (172 lb)   BMI 30 47 kg/m²       Foot Exam    General  General Appearance: appears stated age and healthy       Right Foot/Ankle     Inspection and Palpation  Arch: pes planus  Hammertoes: second toe, fifth toe, fourth toe and third toe  Hallux valgus: yes  Skin Exam: callus, dry skin and skin changes; Neurovascular  Dorsalis pedis: 1+  Posterior tibial: 1+  Achilles reflex: 1+  Babinski reflex: 1+      Left Foot/Ankle      Inspection and Palpation  Arch: pes planus  Hammertoes: second toe, fifth toe, fourth toe and third toe  Hallux valgus: yes  Skin Exam: callus, dry skin and skin changes; Neurovascular  Dorsalis pedis: 1+  Posterior tibial: 1+  Achilles reflex: 1+  Babinski reflex: 1+          Right Foot/Ankle   Right Foot Inspection  Skin Exam: dry skin, callus and callus                              Vascular    The right DP pulse is 1+  The right PT pulse is 1+  Right Toe  - Comprehensive Exam  Arch: pes planus  Hammertoes: second toe, fifth toe, fourth toe and third toe  Hallux valgus: yes    Left Foot/Ankle  Left Foot Inspection  Skin Exam: dry skin and callus                                           Vascular    The left DP pulse is 1+  The left PT pulse is 1+  Left Toe  - Comprehensive Exam  Arch: pes planus  Hammertoes: second toe, fifth toe, fourth toe and third toe  Hallux valgus: yes         Physical Exam  Cardiovascular:      Pulses:           Dorsalis pedis pulses are 1+ on the right side and 1+ on the left side  Posterior tibial pulses are 1+ on the right side and 1+ on the left side  Musculoskeletal:      Right foot: Bunion present  Left foot: Bunion present     Feet:      Right foot: Skin integrity: Callus and dry skin present  Left foot:      Skin integrity: Callus and dry skin present  Skin:     Comments:  Dystrophic discolored toenails,   incurvated ingrown toenails bilateral     multiple hard skin lesion plantar foot and distal tip of the hammertoe deformities   Neurological:      Deep Tendon Reflexes:      Reflex Scores:       Achilles reflexes are 1+ on the right side and 1+ on the left side  Rigidly contracted hammertoes 2 through 5 bilateral   Painful hyperkeratotic lesion distal right 3rd digit   Painful hyperkeratotic lesion the distal medial aspect of the left hallux     No open wound or signs of infection  Skin hairless and atrophic vascular changes noted bilateral  Significant bunion hammertoes bilateral    +1 edema bilateral feet and ankles  Hyperpigmentation lower legs bilateral  Venous stasis bilateral    A bilateral hallucal nails ingrown lateral nail border, onychocryptosis, pain on palpation

## 2021-07-15 NOTE — PROGRESS NOTES
Hearing Aid Visit:    Name:  Augie Camacho  :  1950  Age:  79 y o  Date of Evaluation: 07/15/21     Augie Camacho is being seen for a scheduled 6 month hearing aid visit  Patient is fit with Oticon OPN 3 minirite hearing aid(s)  Right serial number [id number]  Left serial number [id number]  Warranty date: 3/30/2022 (Loss/Damage and repair)  Patient reports the right hearing aid is not working  The "music" is not coming on  She was accompanied today by Vijay Livingston  Action:  1  Cleaned and checked both aids  Changed dome and wax guard (was full) on right aid  Left aid was clear  2  Removed moderate wax from both canals today L > R     3  Listening check, after wax guard change, indicated good output clear of static  The patient was happy with the outcome  Recommendations:   1  RTO 2021 at 3:00 for 2 year audiogram and 5 month hearing aid check  See amount of wax present to determine if needs 4, 5, or 6 month visit for maintenance  Discussed with Meenakshi Méndez , CCC-A, [place]# [id number], Hearing Aid Dispenser, [place]# [id number]  Clinical Audiologist

## 2021-10-07 ENCOUNTER — OFFICE VISIT (OUTPATIENT)
Dept: PODIATRY | Facility: CLINIC | Age: 71
End: 2021-10-07
Payer: MEDICARE

## 2021-10-07 VITALS
BODY MASS INDEX: 30.48 KG/M2 | WEIGHT: 172 LBS | RESPIRATION RATE: 17 BRPM | SYSTOLIC BLOOD PRESSURE: 128 MMHG | HEIGHT: 63 IN | DIASTOLIC BLOOD PRESSURE: 77 MMHG

## 2021-10-07 DIAGNOSIS — I70.209 ATHEROSCLEROSIS OF ARTERIES OF EXTREMITIES (HCC): ICD-10-CM

## 2021-10-07 DIAGNOSIS — M20.41 HAMMER TOES OF BOTH FEET: Primary | ICD-10-CM

## 2021-10-07 DIAGNOSIS — M79.671 PAIN IN BOTH FEET: ICD-10-CM

## 2021-10-07 DIAGNOSIS — B35.1 ONYCHOMYCOSIS: ICD-10-CM

## 2021-10-07 DIAGNOSIS — M20.42 HAMMER TOES OF BOTH FEET: Primary | ICD-10-CM

## 2021-10-07 DIAGNOSIS — M79.672 PAIN IN BOTH FEET: ICD-10-CM

## 2021-10-07 DIAGNOSIS — L84 CORNS: ICD-10-CM

## 2021-10-07 PROCEDURE — 11056 PARNG/CUTG B9 HYPRKR LES 2-4: CPT | Performed by: PODIATRIST

## 2021-10-20 ENCOUNTER — APPOINTMENT (OUTPATIENT)
Dept: LAB | Facility: CLINIC | Age: 71
End: 2021-10-20
Payer: MEDICARE

## 2021-10-20 DIAGNOSIS — R53.83 FATIGUE, UNSPECIFIED TYPE: ICD-10-CM

## 2021-10-20 DIAGNOSIS — M32.10 LUPOID NEPHRITIS (HCC): ICD-10-CM

## 2021-10-20 DIAGNOSIS — N08 LUPOID NEPHRITIS (HCC): ICD-10-CM

## 2021-10-20 DIAGNOSIS — Z51.11 ENCOUNTER FOR ANTINEOPLASTIC CHEMOTHERAPY: ICD-10-CM

## 2021-10-20 LAB
C3 SERPL-MCNC: 103 MG/DL (ref 90–180)
C4 SERPL-MCNC: 16 MG/DL (ref 10–40)

## 2021-10-20 PROCEDURE — 86160 COMPLEMENT ANTIGEN: CPT

## 2021-10-20 PROCEDURE — 86038 ANTINUCLEAR ANTIBODIES: CPT

## 2021-10-20 PROCEDURE — 36415 COLL VENOUS BLD VENIPUNCTURE: CPT

## 2021-10-20 PROCEDURE — 86225 DNA ANTIBODY NATIVE: CPT

## 2021-10-21 LAB
DSDNA AB SER-ACNC: 1 IU/ML (ref 0–9)
RYE IGE QN: NEGATIVE

## 2021-11-05 ENCOUNTER — OFFICE VISIT (OUTPATIENT)
Dept: GASTROENTEROLOGY | Facility: CLINIC | Age: 71
End: 2021-11-05
Payer: MEDICARE

## 2021-11-05 VITALS
HEIGHT: 63 IN | BODY MASS INDEX: 27.57 KG/M2 | TEMPERATURE: 97.6 F | WEIGHT: 155.6 LBS | SYSTOLIC BLOOD PRESSURE: 122 MMHG | HEART RATE: 69 BPM | DIASTOLIC BLOOD PRESSURE: 84 MMHG

## 2021-11-05 DIAGNOSIS — Z86.010 HISTORY OF COLON POLYPS: Primary | ICD-10-CM

## 2021-11-05 PROCEDURE — 99203 OFFICE O/P NEW LOW 30 MIN: CPT | Performed by: PHYSICIAN ASSISTANT

## 2021-11-05 RX ORDER — CLINDAMYCIN HYDROCHLORIDE 300 MG/1
CAPSULE ORAL DAILY PRN
COMMUNITY
Start: 2021-10-01

## 2021-11-05 RX ORDER — METFORMIN HYDROCHLORIDE EXTENDED-RELEASE TABLETS 500 MG/1
500 TABLET, FILM COATED, EXTENDED RELEASE ORAL 2 TIMES DAILY WITH MEALS
COMMUNITY
Start: 2021-10-08

## 2021-11-05 RX ORDER — POLYETHYLENE GLYCOL 3350 17 G/17G
238 POWDER, FOR SOLUTION ORAL ONCE
Qty: 238 G | Refills: 0 | Status: SHIPPED | OUTPATIENT
Start: 2021-11-05 | End: 2021-12-16

## 2021-12-14 ENCOUNTER — APPOINTMENT (OUTPATIENT)
Dept: LAB | Facility: CLINIC | Age: 71
End: 2021-12-14
Payer: MEDICARE

## 2021-12-14 DIAGNOSIS — E66.9 OBESITY, DIABETES, AND HYPERTENSION SYNDROME (HCC): ICD-10-CM

## 2021-12-14 DIAGNOSIS — E11.69 OBESITY, DIABETES, AND HYPERTENSION SYNDROME (HCC): ICD-10-CM

## 2021-12-14 DIAGNOSIS — E11.59 OBESITY, DIABETES, AND HYPERTENSION SYNDROME (HCC): ICD-10-CM

## 2021-12-14 DIAGNOSIS — I15.2 OBESITY, DIABETES, AND HYPERTENSION SYNDROME (HCC): ICD-10-CM

## 2021-12-14 LAB — T4 FREE SERPL-MCNC: 0.85 NG/DL (ref 0.76–1.46)

## 2021-12-14 PROCEDURE — 84439 ASSAY OF FREE THYROXINE: CPT

## 2021-12-22 ENCOUNTER — OFFICE VISIT (OUTPATIENT)
Dept: AUDIOLOGY | Facility: CLINIC | Age: 71
End: 2021-12-22
Payer: MEDICARE

## 2021-12-22 ENCOUNTER — TELEPHONE (OUTPATIENT)
Dept: PREADMISSION TESTING | Facility: HOSPITAL | Age: 71
End: 2021-12-22

## 2021-12-22 DIAGNOSIS — H90.3 SENSORY HEARING LOSS, BILATERAL: Primary | ICD-10-CM

## 2021-12-22 PROCEDURE — 92557 COMPREHENSIVE HEARING TEST: CPT | Performed by: AUDIOLOGIST

## 2021-12-22 PROCEDURE — 92567 TYMPANOMETRY: CPT | Performed by: AUDIOLOGIST

## 2021-12-30 ENCOUNTER — OFFICE VISIT (OUTPATIENT)
Dept: PODIATRY | Facility: CLINIC | Age: 71
End: 2021-12-30
Payer: MEDICARE

## 2021-12-30 VITALS — WEIGHT: 155 LBS | BODY MASS INDEX: 27.46 KG/M2 | RESPIRATION RATE: 17 BRPM | HEIGHT: 63 IN

## 2021-12-30 DIAGNOSIS — L84 CORNS: ICD-10-CM

## 2021-12-30 DIAGNOSIS — M79.671 PAIN IN BOTH FEET: Primary | ICD-10-CM

## 2021-12-30 DIAGNOSIS — B35.3 TINEA PEDIS OF BOTH FEET: ICD-10-CM

## 2021-12-30 DIAGNOSIS — B35.1 ONYCHOMYCOSIS: ICD-10-CM

## 2021-12-30 DIAGNOSIS — M20.41 HAMMER TOES OF BOTH FEET: ICD-10-CM

## 2021-12-30 DIAGNOSIS — M20.42 HAMMER TOES OF BOTH FEET: ICD-10-CM

## 2021-12-30 DIAGNOSIS — M79.672 PAIN IN BOTH FEET: Primary | ICD-10-CM

## 2021-12-30 DIAGNOSIS — I70.209 ATHEROSCLEROSIS OF ARTERIES OF EXTREMITIES (HCC): ICD-10-CM

## 2021-12-30 PROCEDURE — 99212 OFFICE O/P EST SF 10 MIN: CPT | Performed by: PODIATRIST

## 2022-01-05 ENCOUNTER — ANESTHESIA EVENT (OUTPATIENT)
Dept: GASTROENTEROLOGY | Facility: AMBULARY SURGERY CENTER | Age: 72
End: 2022-01-05

## 2022-01-05 ENCOUNTER — HOSPITAL ENCOUNTER (OUTPATIENT)
Dept: GASTROENTEROLOGY | Facility: AMBULARY SURGERY CENTER | Age: 72
Setting detail: OUTPATIENT SURGERY
Discharge: HOME | End: 2022-01-05
Attending: INTERNAL MEDICINE
Payer: MEDICARE

## 2022-01-05 ENCOUNTER — ANESTHESIA (OUTPATIENT)
Dept: GASTROENTEROLOGY | Facility: AMBULARY SURGERY CENTER | Age: 72
End: 2022-01-05

## 2022-01-05 VITALS
TEMPERATURE: 96.5 F | HEART RATE: 71 BPM | DIASTOLIC BLOOD PRESSURE: 69 MMHG | SYSTOLIC BLOOD PRESSURE: 113 MMHG | RESPIRATION RATE: 18 BRPM | OXYGEN SATURATION: 94 %

## 2022-01-05 DIAGNOSIS — Z86.010 HISTORY OF COLON POLYPS: ICD-10-CM

## 2022-01-05 LAB
GLUCOSE SERPL-MCNC: 107 MG/DL (ref 65–140)
GLUCOSE SERPL-MCNC: 67 MG/DL (ref 65–140)

## 2022-01-05 PROCEDURE — G0121 COLON CA SCRN NOT HI RSK IND: HCPCS | Performed by: INTERNAL MEDICINE

## 2022-01-05 PROCEDURE — 82948 REAGENT STRIP/BLOOD GLUCOSE: CPT

## 2022-01-05 RX ORDER — PROPOFOL 10 MG/ML
INJECTION, EMULSION INTRAVENOUS CONTINUOUS PRN
Status: DISCONTINUED | OUTPATIENT
Start: 2022-01-05 | End: 2022-01-05

## 2022-01-05 RX ORDER — LIDOCAINE HYDROCHLORIDE 10 MG/ML
INJECTION, SOLUTION EPIDURAL; INFILTRATION; INTRACAUDAL; PERINEURAL AS NEEDED
Status: DISCONTINUED | OUTPATIENT
Start: 2022-01-05 | End: 2022-01-05

## 2022-01-05 RX ORDER — DEXTROSE MONOHYDRATE 25 G/50ML
INJECTION, SOLUTION INTRAVENOUS AS NEEDED
Status: DISCONTINUED | OUTPATIENT
Start: 2022-01-05 | End: 2022-01-05

## 2022-01-05 RX ORDER — SODIUM CHLORIDE, SODIUM LACTATE, POTASSIUM CHLORIDE, CALCIUM CHLORIDE 600; 310; 30; 20 MG/100ML; MG/100ML; MG/100ML; MG/100ML
INJECTION, SOLUTION INTRAVENOUS CONTINUOUS PRN
Status: DISCONTINUED | OUTPATIENT
Start: 2022-01-05 | End: 2022-01-05

## 2022-01-05 RX ORDER — PROPOFOL 10 MG/ML
INJECTION, EMULSION INTRAVENOUS AS NEEDED
Status: DISCONTINUED | OUTPATIENT
Start: 2022-01-05 | End: 2022-01-05

## 2022-01-05 RX ADMIN — PROPOFOL 150 MCG/KG/MIN: 10 INJECTION, EMULSION INTRAVENOUS at 08:13

## 2022-01-05 RX ADMIN — DEXTROSE MONOHYDRATE 2.5 G: 25 INJECTION, SOLUTION INTRAVENOUS at 08:07

## 2022-01-05 RX ADMIN — DEXTROSE MONOHYDRATE 2.5 G: 25 INJECTION, SOLUTION INTRAVENOUS at 08:10

## 2022-01-05 RX ADMIN — SODIUM CHLORIDE, SODIUM LACTATE, POTASSIUM CHLORIDE, AND CALCIUM CHLORIDE: .6; .31; .03; .02 INJECTION, SOLUTION INTRAVENOUS at 08:07

## 2022-01-05 RX ADMIN — PROPOFOL 100 MG: 10 INJECTION, EMULSION INTRAVENOUS at 08:13

## 2022-01-05 RX ADMIN — LIDOCAINE HYDROCHLORIDE 50 MG: 10 INJECTION, SOLUTION EPIDURAL; INFILTRATION; INTRACAUDAL; PERINEURAL at 08:13

## 2022-01-05 NOTE — H&P
History and Physical - [facility]  Gayatri Boone 70 y o  female MRN: [id number]                  HPI: Gayatri Boone is a 70y o  year old female who presents for history of colon polyps, last colonoscopy performed in 2018      REVIEW OF SYSTEMS: Per the HPI, and otherwise unremarkable      Historical Information   Past Medical History:   Diagnosis Date    Diabetes mellitus ([address] )     pre DM    Dry eye     related to seasonal allergies    GERD (gastroesophageal reflux disease)     Lupus (HCC)     Seasonal allergies     Seizures (HCC)     last seizure 2015     Past Surgical History:   Procedure Laterality Date    COLONOSCOPY      JOINT REPLACEMENT Bilateral     knee     Social History   Social History     Substance and Sexual Activity   Alcohol Use Never     Social History     Substance and Sexual Activity   Drug Use Never     Social History     Tobacco Use   Smoking Status Never Smoker   Smokeless Tobacco Never Used     Family History   Problem Relation Age of Onset    Hypertension Mother     Diabetes Father     Cancer Brother        Meds/Allergies       Current Outpatient Medications:     calcium carbonate (OS-YESICA) 600 MG tablet    famotidine (PEPCID) 20 mg tablet    folic acid (FOLVITE) 1 mg tablet    furosemide (LASIX) 20 mg tablet    Gentle Laxative 5 MG EC tablet    hydroxychloroquine (PLAQUENIL) 200 mg tablet    lactase (LACTAID) 3,000 units tablet    levETIRAcetam (KEPPRA) 500 mg tablet    loratadine (CLARITIN) 10 mg tablet    metFORMIN (FORTAMET) 500 MG (OSM) 24 hr tablet    Multiple Vitamin (TAB-A-ANNELIESE) TABS    Multiple Vitamins-Minerals (MULTIVITAL PO)    PATADAY 0 2 % opth drops    PHENobarbital 64 8 mg tablet    polyethylene glycol (GLYCOLAX) 17 GM/SCOOP powder    Pyridoxine HCl (VITAMIN B-6) 50 MG TABS    Vitamin D, Cholecalciferol, 400 units TABS    clindamycin (CLEOCIN) 300 MG capsule    Allergies   Allergen Reactions    Lactose Intolerance (Gi) - Food Allergy GI Intolerance    Nabumetone Other (See Comments)     Unknown reaction    Penicillins Other (See Comments)     Unknown reacion    Erythromycin Other (See Comments)     Unknown reaction       Objective     /70   Pulse 83   Temp (!) 96 5 °F (35 8 °C) (Temporal)   Resp 18       PHYSICAL EXAM    Gen: NAD  Head: NCAT  CV: RRR  CHEST: Clear  ABD: soft, NT/ND  EXT: no edema      ASSESSMENT/PLAN:  This is a 70y o  year old female here for colonoscopy, and she is stable and optimized for her procedure

## 2022-01-05 NOTE — ANESTHESIA POSTPROCEDURE EVALUATION
Post-Op Assessment Note    CV Status:  Stable  Pain Score: 0    Pain management: adequate     Mental Status:  Alert and awake   Hydration Status:  Euvolemic   PONV Controlled:  Controlled   Airway Patency:  Patent      Post Op Vitals Reviewed: Yes      Staff: CRNA         No complications documented      BP   94/54   Temp     Pulse 75   Resp 12   SpO2 99%

## 2022-01-05 NOTE — PERIOPERATIVE NURSING NOTE
Anesthesia notified of BS= 67 at this time  Medicated by anesthesia with 10cc of 50% dextrose IV  Patient denies any c/o S/S regarding low BP   Awake and alert

## 2022-01-05 NOTE — ANESTHESIA PREPROCEDURE EVALUATION
Procedure:  COLONOSCOPY    Relevant Problems   MUSCULOSKELETAL   (+) Systemic lupus erythematosus (HCC)        Physical Exam    Airway    Mallampati score: II  TM Distance: >3 FB  Neck ROM: full     Dental   No notable dental hx     Cardiovascular      Pulmonary      Other Findings        Anesthesia Plan  ASA Score- 2     Anesthesia Type-     Plan Factors-Exercise tolerance (METS): <4 METS  Chart reviewed  Existing labs reviewed  Patient summary reviewed  Patient is not a current smoker  Induction- intravenous  Postoperative Plan- Plan for postoperative opioid use  Informed Consent- Anesthetic plan and risks discussed with patient  I personally reviewed this patient with the CRNA  Discussed and agreed on the Anesthesia Plan with the CRNA  Neil Nieto

## 2022-03-24 ENCOUNTER — OFFICE VISIT (OUTPATIENT)
Dept: PODIATRY | Facility: CLINIC | Age: 72
End: 2022-03-24
Payer: MEDICARE

## 2022-03-24 VITALS
BODY MASS INDEX: 27.46 KG/M2 | HEIGHT: 63 IN | HEART RATE: 71 BPM | RESPIRATION RATE: 18 BRPM | DIASTOLIC BLOOD PRESSURE: 69 MMHG | SYSTOLIC BLOOD PRESSURE: 113 MMHG | WEIGHT: 155 LBS

## 2022-03-24 DIAGNOSIS — M21.961 DEFORMITY OF BOTH FEET: ICD-10-CM

## 2022-03-24 DIAGNOSIS — I70.209 ATHEROSCLEROSIS OF ARTERIES OF EXTREMITIES (HCC): ICD-10-CM

## 2022-03-24 DIAGNOSIS — B35.1 ONYCHOMYCOSIS: ICD-10-CM

## 2022-03-24 DIAGNOSIS — M20.42 HAMMER TOES OF BOTH FEET: Primary | ICD-10-CM

## 2022-03-24 DIAGNOSIS — M20.41 HAMMER TOES OF BOTH FEET: Primary | ICD-10-CM

## 2022-03-24 DIAGNOSIS — L84 CORNS: ICD-10-CM

## 2022-03-24 DIAGNOSIS — L60.0 INGROWN TOENAIL: ICD-10-CM

## 2022-03-24 DIAGNOSIS — M21.962 DEFORMITY OF BOTH FEET: ICD-10-CM

## 2022-03-24 PROCEDURE — 99213 OFFICE O/P EST LOW 20 MIN: CPT | Performed by: PODIATRIST

## 2022-04-19 ENCOUNTER — OFFICE VISIT (OUTPATIENT)
Dept: AUDIOLOGY | Facility: CLINIC | Age: 72
End: 2022-04-19

## 2022-04-19 DIAGNOSIS — H90.3 SENSORY HEARING LOSS, BILATERAL: Primary | ICD-10-CM

## 2022-04-19 NOTE — PROGRESS NOTES
Hearing Aid Visit:    Name:  Pam Rincon  :  1950  Age:  70 y o  Date of Evaluation: 22     Pam Rincon is being seen for a hearing aid visit  Patient is fit with [employer] OPN 3 minirite hearing aid(s)  Right serial number [id number]  Left serial number [id number]  Warranty date: 3/30/2022 (Loss/Damage and repair)  Patient reports no issues with the devices  She has some issues with the aids when masked  Action:  1  Changed both canal locks and the right HA wax guard  All other parts were clear  2  Mild cerumen in both canals    Recommendations:   1  RTO 22 for HA visit  Schedule annual audio for after 22 upon next visit        Meenakshi Patel , CCC-A, [place]# [id number], Hearing Aid Dispenser, [place]# [id number]  Clinical Audiologist

## 2022-06-15 NOTE — PROGRESS NOTES
Assessment/Plan:    Patient opted out of oral and topical anti fungal medications at this time, all onychomycotic dystrophic nails debrided using sterile Nipper and electrical demond to patient tolerance, Betadine applied, patient educated on daily foot hygiene for the management of fungal infection  - discussed the biomechanical etiology of the lesions including the effect of shoe gear, aseptic debridement and paring of a hyperkeratotic lesions at the location described ,  utilizing a sterile instruments was performed removing hyperkeratotic tissue to viable intact skin, patient tolerated the procedure well, patient educated and the use of proper shoe gear and orthotics to alleviate pressure and application of daily moisturizers    Foot patient use toe crest strapping to adjust the toe in a rectus position offload the deformity and prevent pressure ulceration and to alleviate pain  Toe crest dispensed with the patient and 2nd and 3rd toe strapped in rectus position bilateral    Discussed finding with patient and aide, patient to return p r n  Diagnoses and all orders for this visit:    Hammer toes of both feet    Onychomycosis    Corns    Atherosclerosis of arteries of extremities (HCC)    Deformity of both feet    Ingrown toenail          Subjective:      Patient ID: Keegan Cerna is a 70 y o  female      70year old female past medical history significant of peripheral artery disease and bilateral foot deformity and systemic lupus, presents to the office accompanied by her aide for the management of fungal toenails and painful hyperkeratotic lesion plantar foot, and hammertoe deformity, patient is unable to self treat due to her developmental and neurological conditions, patient currently denies constitutional symptoms patient denies trauma      The following portions of the patient's history were reviewed and updated as appropriate: allergies, current medications, past family history, past medical history, past social history, past surgical history and problem list     Review of Systems   Constitutional: Negative  Respiratory: Negative  Cardiovascular: Positive for leg swelling  Musculoskeletal: Positive for arthralgias and gait problem  Skin: Positive for color change and rash  Historical Information   Past Medical History:   Diagnosis Date    Diabetes mellitus ([address] )     pre DM    Dry eye     related to seasonal allergies    GERD (gastroesophageal reflux disease)     Lupus (HCC)     Seasonal allergies     Seizures (HCC)     last seizure 2015     Past Surgical History:   Procedure Laterality Date    COLONOSCOPY      JOINT REPLACEMENT Bilateral     knee     Social History   Social History     Substance and Sexual Activity   Alcohol Use Never     Social History     Substance and Sexual Activity   Drug Use Never     Social History     Tobacco Use   Smoking Status Never Smoker   Smokeless Tobacco Never Used     Family History:   Family History   Problem Relation Age of Onset    Hypertension Mother     Diabetes Father     Cancer Brother        Meds/Allergies   all medications and allergies reviewed  Allergies   Allergen Reactions    Lactose Intolerance (Gi) - Food Allergy GI Intolerance    Nabumetone Other (See Comments)     Unknown reaction    Penicillins Other (See Comments)     Unknown reacion    Erythromycin Other (See Comments)     Unknown reaction       Objective:      /69   Pulse 71   Resp 18   Ht 5' 3" (1 6 m)   Wt 70 3 kg (155 lb)   BMI 27 46 kg/m²          Physical Exam  Constitutional:       General: She is not in acute distress  Appearance: She is well-developed  She is not ill-appearing, toxic-appearing or diaphoretic  HENT:      Head: Normocephalic  Cardiovascular:      Pulses:           Dorsalis pedis pulses are 1+ on the right side and 1+ on the left side  Posterior tibial pulses are 0 on the right side and 0 on the left side        Comments: Palpable DP pulse, nonpalpable PT pulse, CFT is less than 3 seconds, temperature gradient within normal limit, a trophic skin changes noted with skin thinning in shiny, patient report occasional claudication to bilateral calf, localized edema foot and ankle Q9  Pulmonary:      Effort: Pulmonary effort is normal    Musculoskeletal:         General: Tenderness and deformity present  Comments: Pes planus type foot pain on palpation and range of motion of the 1st MPJ, metatarsal heads bilateral foot, pain on palpation on range of motion of the ST joint, crepitus noted in midfoot joint  Hammertoe deformity bilateral foot, pain on palpation to the PIP joint, callus formation distal tuft    Pain on palpation to the lesser metatarsal head bilateral   Feet:      Right foot:      Skin integrity: Callus, dry skin and fissure present  Toenail Condition: Right toenails are abnormally thick, long and ingrown  Fungal disease present  Left foot:      Skin integrity: Callus, dry skin and fissure present  Toenail Condition: Left toenails are abnormally thick, long and ingrown  Fungal disease present  Skin:     General: Skin is dry  Capillary Refill: Capillary refill takes 2 to 3 seconds  Comments: Trophic skin changes bilateral foot and ankle, alternating hypopigmentation and hyperpigmentation patches around the foot and ankle on anterior leg, skin is shiny and thin, absent hair growth, atrophy of fat pad  Multiple callus formation plantar foot painful on palpation to plantar heel bilateral, skin lines absent, hyperkeratotic rim and central atrophy noted  Thickened dystrophic discolored toenails of bilateral hallux, 5th digit bilateral, subungual debris and painful upon palpation, all other nails show signs of dystrophy with no subungual debris, all nails have malodor  Neurological:      Mental Status: She is alert and oriented to person, place, and time  Sensory: Sensory deficit present  Motor: Atrophy present        Deep Tendon Reflexes: Reflexes abnormal       Foot Exam    Right Foot/Ankle     Inspection and Palpation  Skin Exam: callus, dry skin and fissure;     Neurovascular  Dorsalis pedis: 1+  Posterior tibial: 0      Left Foot/Ankle      Inspection and Palpation  Skin Exam: callus, dry skin and fissure;     Neurovascular  Dorsalis pedis: 1+  Posterior tibial: 0

## 2022-06-23 ENCOUNTER — OFFICE VISIT (OUTPATIENT)
Dept: PODIATRY | Facility: CLINIC | Age: 72
End: 2022-06-23
Payer: MEDICARE

## 2022-06-23 VITALS — HEART RATE: 71 BPM | HEIGHT: 63 IN | RESPIRATION RATE: 18 BRPM | WEIGHT: 155 LBS | BODY MASS INDEX: 27.46 KG/M2

## 2022-06-23 DIAGNOSIS — M21.961 DEFORMITY OF BOTH FEET: ICD-10-CM

## 2022-06-23 DIAGNOSIS — M20.42 HAMMER TOES OF BOTH FEET: Primary | ICD-10-CM

## 2022-06-23 DIAGNOSIS — B35.1 ONYCHOMYCOSIS: ICD-10-CM

## 2022-06-23 DIAGNOSIS — I70.209 ATHEROSCLEROSIS OF ARTERIES OF EXTREMITIES (HCC): ICD-10-CM

## 2022-06-23 DIAGNOSIS — M79.672 PAIN IN BOTH FEET: ICD-10-CM

## 2022-06-23 DIAGNOSIS — M20.41 HAMMER TOES OF BOTH FEET: Primary | ICD-10-CM

## 2022-06-23 DIAGNOSIS — M21.962 DEFORMITY OF BOTH FEET: ICD-10-CM

## 2022-06-23 DIAGNOSIS — L84 CORNS: ICD-10-CM

## 2022-06-23 DIAGNOSIS — L60.0 INGROWN TOENAIL: ICD-10-CM

## 2022-06-23 DIAGNOSIS — M79.671 PAIN IN BOTH FEET: ICD-10-CM

## 2022-06-23 PROCEDURE — 99212 OFFICE O/P EST SF 10 MIN: CPT | Performed by: PODIATRIST

## 2022-08-23 ENCOUNTER — OFFICE VISIT (OUTPATIENT)
Dept: AUDIOLOGY | Facility: CLINIC | Age: 72
End: 2022-08-23

## 2022-08-23 DIAGNOSIS — H90.3 SENSORY HEARING LOSS, BILATERAL: Primary | ICD-10-CM

## 2022-08-23 NOTE — PROGRESS NOTES
Hearing Aid Visit:    Name:  Sade Jose  :  1950  Age:  70 y o  Date of Evaluation: 22     Sade Jose is being seen for a hearing aid visit  Patient is fit with [employer] OPN 3 minirite hearing aid(s)  (312)  Right serial number [id number]  Left serial number [id number]  Warranty date: [facility] 3/30/2022 (Loss/Damage and repair)  Patient reports the batteries are "not lasting" the usual 7 days (they are changed every )  She has some issues with the aids when masked  Action:  1  Changed both canal locks and domes   2  Mild cerumen in both canals, left greater than right, removed via curette w/o incident   3  Checked wear time in data log  It is reduced  Discussed with patient and caregiver  4  Aids were in good working order via listening check  Recommendations:   1  RTO 2022 for annual HE / HAV  Consider HAE at that time     2  See attached Med Consult form     Meenakshi Thayer , CCC-A, [place]# [id number], Hearing Aid Dispenser, [place]# [id number]  Clinical Audiologist

## 2022-09-15 ENCOUNTER — OFFICE VISIT (OUTPATIENT)
Dept: PODIATRY | Facility: CLINIC | Age: 72
End: 2022-09-15

## 2022-09-15 VITALS — HEIGHT: 63 IN | WEIGHT: 155 LBS | BODY MASS INDEX: 27.46 KG/M2 | RESPIRATION RATE: 18 BRPM

## 2022-09-15 DIAGNOSIS — L84 CORNS: ICD-10-CM

## 2022-09-15 DIAGNOSIS — M21.961 DEFORMITY OF BOTH FEET: ICD-10-CM

## 2022-09-15 DIAGNOSIS — M79.671 PAIN IN BOTH FEET: ICD-10-CM

## 2022-09-15 DIAGNOSIS — M20.42 HAMMER TOES OF BOTH FEET: ICD-10-CM

## 2022-09-15 DIAGNOSIS — M79.672 PAIN IN BOTH FEET: ICD-10-CM

## 2022-09-15 DIAGNOSIS — M20.41 HAMMER TOES OF BOTH FEET: ICD-10-CM

## 2022-09-15 DIAGNOSIS — I70.209 ATHEROSCLEROSIS OF ARTERIES OF EXTREMITIES (HCC): ICD-10-CM

## 2022-09-15 DIAGNOSIS — B35.3 TINEA PEDIS OF BOTH FEET: ICD-10-CM

## 2022-09-15 DIAGNOSIS — L60.0 INGROWN TOENAIL: ICD-10-CM

## 2022-09-15 DIAGNOSIS — B35.1 ONYCHOMYCOSIS: Primary | ICD-10-CM

## 2022-09-15 DIAGNOSIS — M21.962 DEFORMITY OF BOTH FEET: ICD-10-CM

## 2022-09-15 NOTE — PROGRESS NOTES
Assessment/Plan:    Patient opted out of oral and topical anti fungal medications at this time, all onychomycotic dystrophic nails debrided using sterile Nipper and electrical demond to patient tolerance, Betadine applied, patient educated on daily foot hygiene for the management of fungal infection  - discussed the biomechanical etiology of the lesions including the effect of shoe gear, aseptic debridement and paring of a hyperkeratotic lesions at the location described ,  utilizing a sterile instruments was performed removing hyperkeratotic tissue to viable intact skin, patient tolerated the procedure well, patient educated and the use of proper shoe gear and orthotics to alleviate pressure and application of daily moisturizers    patient use toe crest strapping to adjust the toe in a rectus position offload the deformity and prevent pressure ulceration and to alleviate pain      Discussed finding with patient and aide, patient to return p r n  Diagnoses and all orders for this visit:    Hammer toes of both feet    Onychomycosis    Corns    Atherosclerosis of arteries of extremities (HCC)    Deformity of both feet    Ingrown toenail    Pain in both feet          Subjective:      Patient ID: Nitin Donovan is a 67 y o  female      70year old female past medical history significant of peripheral artery disease and bilateral foot deformity and systemic lupus, presents to the office accompanied by her aide for the management of fungal toenails and painful hyperkeratotic lesion plantar foot, and hammertoe deformity, patient is unable to self treat due to her developmental and neurological conditions, patient currently denies constitutional symptoms patient denies trauma      The following portions of the patient's history were reviewed and updated as appropriate: allergies, current medications, past family history, past medical history, past social history, past surgical history and problem list     Review of Systems   Constitutional: Negative  Respiratory: Negative  Cardiovascular: Positive for leg swelling  Musculoskeletal: Positive for arthralgias and gait problem  Skin: Positive for color change and rash  Historical Information   Past Medical History:   Diagnosis Date    Diabetes mellitus ([address] )     pre DM    Dry eye     related to seasonal allergies    GERD (gastroesophageal reflux disease)     Lupus (HCC)     Seasonal allergies     Seizures (HCC)     last seizure 2015     Past Surgical History:   Procedure Laterality Date    COLONOSCOPY      JOINT REPLACEMENT Bilateral     knee     Social History   Social History     Substance and Sexual Activity   Alcohol Use Never     Social History     Substance and Sexual Activity   Drug Use Never     Social History     Tobacco Use   Smoking Status Never Smoker   Smokeless Tobacco Never Used     Family History:   Family History   Problem Relation Age of Onset    Hypertension Mother     Diabetes Father     Cancer Brother        Meds/Allergies   all medications and allergies reviewed  Allergies   Allergen Reactions    Lactose Intolerance (Gi) - Food Allergy GI Intolerance    Nabumetone Other (See Comments)     Unknown reaction    Penicillins Other (See Comments)     Unknown reacion    Erythromycin Other (See Comments)     Unknown reaction       Objective:      Pulse 71   Resp 18   Ht 5' 3" (1 6 m)   Wt 70 3 kg (155 lb)   BMI 27 46 kg/m²          Physical Exam  Constitutional:       General: She is not in acute distress  Appearance: She is well-developed  She is not ill-appearing, toxic-appearing or diaphoretic  HENT:      Head: Normocephalic  Cardiovascular:      Pulses:           Dorsalis pedis pulses are 1+ on the right side and 1+ on the left side  Posterior tibial pulses are 0 on the right side and 0 on the left side        Comments: Palpable DP pulse, nonpalpable PT pulse, CFT is less than 3 seconds, temperature gradient within normal limit, a trophic skin changes noted with skin thinning in shiny, patient report occasional claudication to bilateral calf, localized edema foot and ankle Q9  Pulmonary:      Effort: Pulmonary effort is normal    Musculoskeletal:         General: Tenderness and deformity present  Comments: Pes planus type foot pain on palpation and range of motion of the 1st MPJ, metatarsal heads bilateral foot, pain on palpation on range of motion of the ST joint, crepitus noted in midfoot joint  Hammertoe deformity bilateral foot, pain on palpation to the PIP joint, callus formation distal tuft    Pain on palpation to the lesser metatarsal head bilateral   Feet:      Right foot:      Skin integrity: Callus, dry skin and fissure present  Toenail Condition: Right toenails are abnormally thick, long and ingrown  Fungal disease present  Left foot:      Skin integrity: Callus, dry skin and fissure present  Toenail Condition: Left toenails are abnormally thick, long and ingrown  Fungal disease present  Skin:     General: Skin is dry  Capillary Refill: Capillary refill takes 2 to 3 seconds  Comments: Trophic skin changes bilateral foot and ankle, alternating hypopigmentation and hyperpigmentation patches around the foot and ankle on anterior leg, skin is shiny and thin, absent hair growth, atrophy of fat pad  Multiple callus formation plantar foot painful on palpation to plantar heel bilateral, skin lines absent, hyperkeratotic rim and central atrophy noted  Thickened dystrophic discolored toenails of bilateral hallux, 5th digit bilateral, subungual debris and painful upon palpation, all other nails show signs of dystrophy with no subungual debris, all nails have malodor  Neurological:      Mental Status: She is alert and oriented to person, place, and time  Sensory: Sensory deficit present  Motor: Atrophy present        Deep Tendon Reflexes: Reflexes abnormal  Foot Exam    Right Foot/Ankle     Inspection and Palpation  Skin Exam: callus, dry skin and fissure;     Neurovascular  Dorsalis pedis: 1+  Posterior tibial: 0      Left Foot/Ankle      Inspection and Palpation  Skin Exam: callus, dry skin and fissure;     Neurovascular  Dorsalis pedis: 1+  Posterior tibial: 0

## 2022-12-04 NOTE — PROGRESS NOTES
Assessment/Plan:    Patient opted out of oral and topical anti fungal medications at this time, all onychomycotic dystrophic nails debrided using sterile Nipper and electrical demond to patient tolerance, Betadine applied, patient educated on daily foot hygiene for the management of fungal infection  - discussed the biomechanical etiology of the lesions including the effect of shoe gear, aseptic debridement and paring of a hyperkeratotic lesions at the location described ,  utilizing a sterile instruments was performed removing hyperkeratotic tissue to viable intact skin, patient tolerated the procedure well, patient educated and the use of proper shoe gear and orthotics to alleviate pressure and application of daily moisturizers    patient use toe crest strapping to adjust the toe in a rectus position offload the deformity and prevent pressure ulceration and to alleviate pain      Discussed finding with patient and aide, patient to return p r n  Diagnoses and all orders for this visit:    Onychomycosis    Atherosclerosis of arteries of extremities (HCC)    Corns    Hammer toes of both feet    Deformity of both feet    Ingrown toenail    Tinea pedis of both feet    Pain in both feet          Subjective:      Patient ID: Destin Cooley is a 67 y o  female      67year old female past medical history significant of peripheral artery disease and bilateral foot deformity and systemic lupus, presents to the office accompanied by her aide for the management of fungal toenails and painful hyperkeratotic lesion plantar foot, and hammertoe deformity, patient is unable to self treat due to her developmental and neurological conditions, patient currently denies constitutional symptoms patient denies trauma      The following portions of the patient's history were reviewed and updated as appropriate: allergies, current medications, past family history, past medical history, past social history, past surgical history and problem list     Review of Systems   Constitutional: Negative  Respiratory: Negative  Cardiovascular: Positive for leg swelling  Musculoskeletal: Positive for arthralgias and gait problem  Skin: Positive for color change and rash  Historical Information   Past Medical History:   Diagnosis Date   • Diabetes mellitus ([facility] )     pre DM   • Dry eye     related to seasonal allergies   • GERD (gastroesophageal reflux disease)    • Lupus (HCC)    • Seasonal allergies    • Seizures ([facility] )     last seizure 2015     Past Surgical History:   Procedure Laterality Date   • COLONOSCOPY     • JOINT REPLACEMENT Bilateral     knee     Social History   Social History     Substance and Sexual Activity   Alcohol Use Never     Social History     Substance and Sexual Activity   Drug Use Never     Social History     Tobacco Use   Smoking Status Never   Smokeless Tobacco Never     Family History:   Family History   Problem Relation Age of Onset   • Hypertension Mother    • Diabetes Father    • Cancer Brother        Meds/Allergies   all medications and allergies reviewed  Allergies   Allergen Reactions   • Lactose Intolerance (Gi) - Food Allergy GI Intolerance   • Nabumetone Other (See Comments)     Unknown reaction   • Penicillins Other (See Comments)     Unknown reacion   • Erythromycin Other (See Comments)     Unknown reaction       Objective:      Resp 18   Ht 5' 3" (1 6 m)   Wt 70 3 kg (155 lb)   BMI 27 46 kg/m²          Physical Exam  Constitutional:       General: She is not in acute distress  Appearance: She is well-developed  She is not ill-appearing, toxic-appearing or diaphoretic  HENT:      Head: Normocephalic  Cardiovascular:      Pulses:           Dorsalis pedis pulses are 1+ on the right side and 1+ on the left side  Posterior tibial pulses are 0 on the right side and 0 on the left side        Comments: Palpable DP pulse, nonpalpable PT pulse, CFT is less than 3 seconds, temperature gradient within normal limit, a trophic skin changes noted with skin thinning in shiny, patient report occasional claudication to bilateral calf, localized edema foot and ankle Q9  Pulmonary:      Effort: Pulmonary effort is normal    Musculoskeletal:         General: Tenderness and deformity present  Comments: Pes planus type foot pain on palpation and range of motion of the 1st MPJ, metatarsal heads bilateral foot, pain on palpation on range of motion of the ST joint, crepitus noted in midfoot joint  Hammertoe deformity bilateral foot, pain on palpation to the PIP joint, callus formation distal tuft    Pain on palpation to the lesser metatarsal head bilateral   Feet:      Right foot:      Skin integrity: Callus, dry skin and fissure present  Toenail Condition: Right toenails are abnormally thick, long and ingrown  Fungal disease present  Left foot:      Skin integrity: Callus, dry skin and fissure present  Toenail Condition: Left toenails are abnormally thick, long and ingrown  Fungal disease present  Skin:     General: Skin is dry  Capillary Refill: Capillary refill takes 2 to 3 seconds  Comments: Trophic skin changes bilateral foot and ankle, alternating hypopigmentation and hyperpigmentation patches around the foot and ankle on anterior leg, skin is shiny and thin, absent hair growth, atrophy of fat pad  Multiple callus formation plantar foot painful on palpation to plantar heel bilateral, skin lines absent, hyperkeratotic rim and central atrophy noted  Thickened dystrophic discolored toenails of bilateral hallux, 5th digit bilateral, subungual debris and painful upon palpation, all other nails show signs of dystrophy with no subungual debris, all nails have malodor  Neurological:      Mental Status: She is alert and oriented to person, place, and time  Sensory: Sensory deficit present  Motor: Atrophy present        Deep Tendon Reflexes: Reflexes abnormal  Foot Exam    Right Foot/Ankle     Inspection and Palpation  Skin Exam: callus, dry skin and fissure;     Neurovascular  Dorsalis pedis: 1+  Posterior tibial: 0      Left Foot/Ankle      Inspection and Palpation  Skin Exam: callus, dry skin and fissure;     Neurovascular  Dorsalis pedis: 1+  Posterior tibial: 0

## 2022-12-12 ENCOUNTER — APPOINTMENT (OUTPATIENT)
Dept: LAB | Facility: CLINIC | Age: 72
End: 2022-12-12

## 2022-12-12 DIAGNOSIS — M41.80 OTHER FORMS OF SCOLIOSIS, SITE UNSPECIFIED: ICD-10-CM

## 2022-12-13 LAB — ANA SER QL IA: NEGATIVE

## 2022-12-27 ENCOUNTER — OFFICE VISIT (OUTPATIENT)
Dept: AUDIOLOGY | Facility: CLINIC | Age: 72
End: 2022-12-27

## 2022-12-27 DIAGNOSIS — H90.3 SENSORY HEARING LOSS, BILATERAL: Primary | ICD-10-CM

## 2022-12-27 NOTE — PROGRESS NOTES
Hearing Aid Visit:    Name:  Jessica Carney  :  1950  Age:  67 y o  Date of Evaluation: 22     Jessica Carney is being seen for a hearing aid visit in conjunction with her annual hearing assessment  Patient is fit with [employer] OPN 3 minirite hearing aid(s)  (312)  Right serial number [id number]  Left serial number [id number]  Warranty date: OOW 3/30/2022 (Loss/Damage and repair)  Dome/Earmold: 6 mm open   : Right # 2 60 / Left # 2 60     Caregiver reports they are now changing the battery every 5 days  Patient is not 100% consistently wearing the devices  She will wear them for program, but not after her nap in the afternoon (for dinner or TV)    Action:  1  Aids were clear of cerumen on the domes or wax guards  2  Aids were in good working order via listening check  Recommendations:   1  HA visit scheduled for 2023 / see if there is updated tech for O[employer]on devices  She is interested in rechargeable for the next pair        Meenaskhi Gifford , CCC-A, [place]# [id number]  Clinical Audiologist

## 2022-12-27 NOTE — PROGRESS NOTES
HEARING EVALUATION    Name:  Linda Vazquez  :  1950  Age:  67 y o  Date of Evaluation: 22     History:  Known bilateral hearing loss with use of [facility] HA's fit in   Reason for visit: Linda Vazquez is being seen today at the request of Dr Octavio Platt for an evaluation of hearing  Patient reports continued issues with wearing a mask and the hearing aids  We discussed the use of mask extenders (she does not like them)  She is also wearing a new pair of glasses which fit well (behind the ear) with the hearing devices  EVALUATION:    Otoscopic Evaluation:   Right Ear: canal cleared   Left Ear: canal cleared     Tympanometry:   Right: Type A - normal middle ear pressure and compliance   Left: Type A - normal middle ear pressure and compliance    Audiogram Results: Within normal limits, 250-1500 Hz, sloping to a mild to moderately severe HFSHL bilaterally  Word recognition scores, in quiet, are 96% for both ears  *see attached audiogram    Thresholds are stable to previous testing in 2021    RECOMMENDATIONS:  1  Continued daily use of hearing aids, during work program and in the evenings with dinner or TV time  2  Annual hearing evaluations for monitoring purposes     PATIENT EDUCATION:   Discussed results and recommendations with Ms Rangel Veras and her caregiver Inell Roch  Questions were addressed and the patient's caregiver was encouraged to contact our department should concerns arise      Meenakshi Daniel , [facility], [place]# [id number]  Clinical Audiologist

## 2022-12-29 ENCOUNTER — OFFICE VISIT (OUTPATIENT)
Dept: PODIATRY | Facility: CLINIC | Age: 72
End: 2022-12-29

## 2022-12-29 VITALS — WEIGHT: 155 LBS | RESPIRATION RATE: 18 BRPM | BODY MASS INDEX: 27.46 KG/M2 | HEIGHT: 63 IN

## 2022-12-29 DIAGNOSIS — L84 CORNS: ICD-10-CM

## 2022-12-29 DIAGNOSIS — I70.209 ATHEROSCLEROSIS OF ARTERIES OF EXTREMITIES (HCC): ICD-10-CM

## 2022-12-29 DIAGNOSIS — M79.672 PAIN IN BOTH FEET: ICD-10-CM

## 2022-12-29 DIAGNOSIS — M20.42 HAMMER TOES OF BOTH FEET: ICD-10-CM

## 2022-12-29 DIAGNOSIS — L84 CALLUS: ICD-10-CM

## 2022-12-29 DIAGNOSIS — L60.0 INGROWN TOENAIL: ICD-10-CM

## 2022-12-29 DIAGNOSIS — M79.671 PAIN IN BOTH FEET: ICD-10-CM

## 2022-12-29 DIAGNOSIS — M20.41 HAMMER TOES OF BOTH FEET: ICD-10-CM

## 2022-12-29 DIAGNOSIS — B35.1 ONYCHOMYCOSIS: Primary | ICD-10-CM

## 2022-12-30 NOTE — PROGRESS NOTES
Assessment/Plan:    Patient opted out of oral and topical anti fungal medications at this time, all onychomycotic dystrophic nails debrided using sterile Nipper and electrical demond to patient tolerance, Betadine applied, patient educated on daily foot hygiene for the management of fungal infection  - discussed the biomechanical etiology of the lesions including the effect of shoe gear, aseptic debridement and paring of a hyperkeratotic lesions at the location described ,  utilizing a sterile instruments was performed removing hyperkeratotic tissue to viable intact skin, patient tolerated the procedure well, patient educated and the use of proper shoe gear and orthotics to alleviate pressure and application of daily moisturizers    patient use toe crest strapping to adjust the toe in a rectus position offload the deformity and prevent pressure ulceration and to alleviate pain      Discussed finding with patient and aide, patient to return p r n  Diagnoses and all orders for this visit:    Onychomycosis    Atherosclerosis of arteries of extremities (HCC)    Corns    Hammer toes of both feet    Ingrown toenail    Pain in both feet    Callus          Subjective:      Patient ID: Jeffy Ashby is a 67 y o  female      67year old female past medical history significant of peripheral artery disease and bilateral foot deformity and systemic lupus, presents to the office accompanied by her aide for the management of fungal toenails and painful hyperkeratotic lesion plantar foot, and hammertoe deformity, patient is unable to self treat due to her developmental and neurological conditions, patient currently denies constitutional symptoms patient denies trauma      The following portions of the patient's history were reviewed and updated as appropriate: allergies, current medications, past family history, past medical history, past social history, past surgical history and problem list     Review of Systems Constitutional: Negative  Respiratory: Negative  Cardiovascular: Positive for leg swelling  Musculoskeletal: Positive for arthralgias and gait problem  Skin: Positive for color change and rash  Historical Information   Past Medical History:   Diagnosis Date   • Diabetes mellitus ([facility] )     pre DM   • Dry eye     related to seasonal allergies   • GERD (gastroesophageal reflux disease)    • Lupus (HCC)    • Seasonal allergies    • Seizures ([facility] )     last seizure 2015     Past Surgical History:   Procedure Laterality Date   • COLONOSCOPY     • JOINT REPLACEMENT Bilateral     knee     Social History   Social History     Substance and Sexual Activity   Alcohol Use Never     Social History     Substance and Sexual Activity   Drug Use Never     Social History     Tobacco Use   Smoking Status Never   Smokeless Tobacco Never     Family History:   Family History   Problem Relation Age of Onset   • Hypertension Mother    • Diabetes Father    • Cancer Brother        Meds/Allergies   all medications and allergies reviewed  Allergies   Allergen Reactions   • Lactose Intolerance (Gi) - Food Allergy GI Intolerance   • Nabumetone Other (See Comments)     Unknown reaction   • Penicillins Other (See Comments)     Unknown reacion   • Erythromycin Other (See Comments)     Unknown reaction       Objective:      Resp 18   Ht 5' 3" (1 6 m)   Wt 70 3 kg (155 lb)   BMI 27 46 kg/m²          Physical Exam  Constitutional:       General: She is not in acute distress  Appearance: She is well-developed  She is not ill-appearing, toxic-appearing or diaphoretic  HENT:      Head: Normocephalic  Cardiovascular:      Pulses:           Dorsalis pedis pulses are 1+ on the right side and 1+ on the left side  Posterior tibial pulses are 0 on the right side and 0 on the left side        Comments: Palpable DP pulse, nonpalpable PT pulse, CFT is less than 3 seconds, temperature gradient within normal limit, a trophic skin changes noted with skin thinning in shiny, patient report occasional claudication to bilateral calf, localized edema foot and ankle Q9  Pulmonary:      Effort: Pulmonary effort is normal    Musculoskeletal:         General: Tenderness and deformity present  Comments: Pes planus type foot pain on palpation and range of motion of the 1st MPJ, metatarsal heads bilateral foot, pain on palpation on range of motion of the ST joint, crepitus noted in midfoot joint  Hammertoe deformity bilateral foot, pain on palpation to the PIP joint, callus formation distal tuft    Pain on palpation to the lesser metatarsal head bilateral   Feet:      Right foot:      Skin integrity: Callus, dry skin and fissure present  Toenail Condition: Right toenails are abnormally thick, long and ingrown  Fungal disease present  Left foot:      Skin integrity: Callus, dry skin and fissure present  Toenail Condition: Left toenails are abnormally thick, long and ingrown  Fungal disease present  Skin:     General: Skin is dry  Capillary Refill: Capillary refill takes 2 to 3 seconds  Comments: Trophic skin changes bilateral foot and ankle, alternating hypopigmentation and hyperpigmentation patches around the foot and ankle on anterior leg, skin is shiny and thin, absent hair growth, atrophy of fat pad  Multiple callus formation plantar foot painful on palpation to plantar heel bilateral, skin lines absent, hyperkeratotic rim and central atrophy noted  Thickened dystrophic discolored toenails of bilateral hallux, 5th digit bilateral, subungual debris and painful upon palpation, all other nails show signs of dystrophy with no subungual debris, all nails have malodor  Neurological:      Mental Status: She is alert and oriented to person, place, and time  Sensory: Sensory deficit present  Motor: Atrophy present        Deep Tendon Reflexes: Reflexes abnormal       Foot Exam    Right Foot/Ankle Inspection and Palpation  Skin Exam: callus, dry skin and fissure;     Neurovascular  Dorsalis pedis: 1+  Posterior tibial: 0      Left Foot/Ankle      Inspection and Palpation  Skin Exam: callus, dry skin and fissure;     Neurovascular  Dorsalis pedis: 1+  Posterior tibial: 0

## 2023-03-28 ENCOUNTER — OFFICE VISIT (OUTPATIENT)
Dept: PODIATRY | Facility: CLINIC | Age: 73
End: 2023-03-28

## 2023-03-28 VITALS — WEIGHT: 155 LBS | RESPIRATION RATE: 17 BRPM | HEIGHT: 63 IN | BODY MASS INDEX: 27.46 KG/M2

## 2023-03-28 DIAGNOSIS — M79.672 PAIN IN BOTH FEET: ICD-10-CM

## 2023-03-28 DIAGNOSIS — B35.1 ONYCHOMYCOSIS: ICD-10-CM

## 2023-03-28 DIAGNOSIS — I70.209 ATHEROSCLEROSIS OF ARTERIES OF EXTREMITIES (HCC): Primary | ICD-10-CM

## 2023-03-28 DIAGNOSIS — M79.671 PAIN IN BOTH FEET: ICD-10-CM

## 2023-03-28 DIAGNOSIS — L84 CORNS: ICD-10-CM

## 2023-03-28 NOTE — PROGRESS NOTES
Sariat Byrd:     Aseptic debridement of pre-trophic hyperkeratotic lesions ×4 plantar feet bilateral  Debridement with #10 scalpel debrided nonviable tissue down to healthy viable tissue      Aseptic debridement and planning of nails x10 and manually and mechanically     Daily foot checks monitor for signs of infection  Follow-up 3 months            Diagnoses and all orders for this visit:     Atherosclerosis of arteries of extremities (HCC)     Pain in both feet     Callus     Deformity of both feet            Subjective:       Patient ID: Talia Jauregui is a 67 y  o  female      Patient has painful hyperkeratotic lesions that hurt when walking wearing shoes   Patient has not noticed any redness or signs of infection         Medical History   No past medical history on file             Current Outpatient Medications:   •  alendronate (FOSAMAX) 70 mg tablet, Take 70 mg by mouth every 7 days, Disp: , Rfl:   •  CALCITONIN, SALMON, NA, into each nostril, Disp: , Rfl:   •  calcium carbonate (OS-YESICA) 600 MG tablet, Take by mouth, Disp: , Rfl:   •  calcium carbonate (OS-YESICA) 600 MG tablet, Take 1 tablet (600 mg total) by mouth 2 (two) times a day with meals, Disp: 180 tablet, Rfl: 3  •  ferrous [id number] C-folic acid (FEROCON) capsule, Take by mouth, Disp: , Rfl:   •  folic acid (FOLVITE) 1 mg tablet, Take by mouth daily, Disp: , Rfl:   •  furosemide (LASIX) 20 mg tablet, TAKE ONE TABLET @ 8AM  DR SAMANIEGO, Disp: 30 tablet, Rfl: 3  •  furosemide (LASIX) 20 mg tablet, TAKE 1 TABLET BY MOUTH DAILY @ 8AM  DR SAMANIEGO, Disp: 30 tablet, Rfl: 5  •  hydroxychloroquine (PLAQUENIL) 200 mg tablet, Take 200 mg by mouth 2 (two) times a day with meals, Disp: , Rfl:   •  lactase (LACTAID) 3,000 units tablet, Take 2 tablets (6,000 Units total) by mouth see administration instructions Take 2 tabs with first bite of dairy as needed  , Disp: 60 tablet, Rfl: 3  •  levETIRAcetam (KEPPRA) 500 mg tablet, , Disp: , Rfl:   •  loratadine (CLARITIN) 10 mg tablet, TAKE 1 TABLET BY MOUTH @ 8PM  DR CHANEY, Disp: 30 tablet, Rfl: 5  •  Multiple Vitamin (TAB-A-ANNELIESE) TABS, Take 1 tablet by mouth daily Take at 8 AM, Disp: 90 tablet, Rfl: 3  •  Multiple Vitamin (TAB-A-ANNELIESE) TABS, TAKE 1 TABLET BY MOUTH DAILY @ 8AM  DR ETIENNE, Disp: 30 tablet, Rfl: 5  •  Multiple Vitamins-Minerals (MULTIVITAL PO), Take by mouth, Disp: , Rfl:   •  PATADAY 0 2 % opth drops, , Disp: , Rfl:   •  PHENobarbital 64 8 mg tablet, Take 64 8 mg by mouth 2 (two) times a day, Disp: , Rfl:   •  pyridoxine (VITAMIN B6) 50 mg tablet, Take 50 mg by mouth daily, Disp: , Rfl:   •  Pyridoxine HCl (VITAMIN B-6) 50 MG TABS, Take by mouth, Disp: , Rfl:   •  ranitidine (ZANTAC) 150 mg tablet, Take 1 tablet (150 mg total) by mouth 2 (two) times a day Take at 8 AM and 8 PM, Disp: 60 tablet, Rfl: 3  •  ranitidine (ZANTAC) 150 mg tablet, TAKE 1 TABLET BY MOUTH DAILY @ 8AM AND @ 8PM, Disp: 60 tablet, Rfl: 5  •  Vitamin D, Cholecalciferol, 400 units TABS, Take 2 tablets (800 Units total) by mouth every morning Take 2 tab daily at 8AM, Disp: 60 tablet, Rfl: 3     Surgical History   No past surgical history on file                  Allergies   Allergen Reactions   • Erythromycin     • Lactose Intolerance (Gi)     • Nabumetone     • Penicillins                 Patient Active Problem List   Diagnosis   • Atherosclerosis of arteries of extremities (HCC)   • Callus   • Deformity of ankle and foot, acquired   • Hammer toes of both feet   • Pain in both feet   • Systemic lupus erythematosus (HCC)         Review of Systems   Constitutional: Negative     HENT: Negative     Eyes: Negative     Respiratory: Negative     Cardiovascular: Negative     Gastrointestinal: Negative     Endocrine: Negative     Genitourinary: Negative     Musculoskeletal: Positive for arthralgias  Skin: Negative     Allergic/Immunologic: Negative     Neurological: Negative     Hematological: Negative     Psychiatric/Behavioral: Negative            Objective:  Patient's shoes and socks were removed, feet examined             Foot Exam     Q, 9 findings bilateral   Abnormal cooling noted bilateral   Negative digital hair of digits     General  General Appearance: appears stated age and healthy         Right Foot/Ankle      Inspection and Palpation  Arch: pes planus  Hammertoes: second toe, fifth toe, fourth toe and third toe  Hallux valgus: yes  Skin Exam: callus, dry skin and skin changes;      Neurovascular  Dorsalis pedis: 1+  Posterior tibial: 1+  Achilles reflex: 1+  Babinski reflex: 1+        Left Foot/Ankle       Inspection and Palpation  Arch: pes planus  Hammertoes: second toe, fifth toe, fourth toe and third toe  Hallux valgus: yes  Skin Exam: callus, dry skin and skin changes;      Neurovascular  Dorsalis pedis: 1+  Posterior tibial: 1+  Achilles reflex: 1+  Babinski reflex: 1+              Right Foot/Ankle   Right Foot Inspection  Skin Exam: dry skin, callus and callus                                 Vascular     The right DP pulse is 1+  The right PT pulse is 1+  Right Toe  - Comprehensive Exam  Arch: pes planus  Hammertoes: second toe, fifth toe, fourth toe and third toe  Hallux valgus: yes     Left Foot/Ankle  Left Foot Inspection  Skin Exam: dry skin and callus                                             Vascular     The left DP pulse is 1+  The left PT pulse is 1+  Left Toe  - Comprehensive Exam  Arch: pes planus  Hammertoes: second toe, fifth toe, fourth toe and third toe  Hallux valgus: yes          Physical Exam   Cardiovascular:   Pulses:       Dorsalis pedis pulses are 1+ on the right side, and 1+ on the left side         Posterior tibial pulses are 1+ on the right side, and 1+ on the left side  Feet:   Right Foot:   Skin Integrity: Positive for callus and dry skin  Left Foot:   Skin Integrity: Positive for callus and dry skin     Neurological:   Reflex Scores:       Achilles reflexes are 1+ on the right side and 1+ on the left side          Rigidly contracted hammertoes 2 through 5 bilateral   Painful hyperkeratotic lesion distal right 3rd digit   Painful hyperkeratotic lesion the distal medial aspect of the left hallux    No open wound or signs of infection  Skin hairless and atrophic vascular changes noted bilateral  Significant bunion hammertoes bilateral     Plus one edema bilateral feet and ankles  Hyperpigmentation lower legs bilateral  Venous stasis bilateral

## 2023-04-06 ENCOUNTER — TELEPHONE (OUTPATIENT)
Dept: FAMILY MEDICINE CLINIC | Facility: CLINIC | Age: 73
End: 2023-04-06

## 2023-04-06 NOTE — TELEPHONE ENCOUNTER
Called [facility] to let them know they faxed consult notes to wrong PCP  Spoke with Marcial Polo  She said she will call pt to obtain new PCP info

## 2023-05-05 ENCOUNTER — APPOINTMENT (OUTPATIENT)
Dept: LAB | Facility: CLINIC | Age: 73
End: 2023-05-05

## 2023-05-05 DIAGNOSIS — E11.65 TYPE II DIABETES MELLITUS WITH HYPEROSMOLARITY, UNCONTROLLED (HCC): ICD-10-CM

## 2023-05-05 DIAGNOSIS — E11.00 TYPE II DIABETES MELLITUS WITH HYPEROSMOLARITY, UNCONTROLLED (HCC): ICD-10-CM

## 2023-05-05 LAB
CHOLEST SERPL-MCNC: 204 MG/DL
HDLC SERPL-MCNC: 103 MG/DL
LDLC SERPL CALC-MCNC: 84 MG/DL (ref 0–100)
NONHDLC SERPL-MCNC: 101 MG/DL
TRIGL SERPL-MCNC: 85 MG/DL

## 2023-05-23 ENCOUNTER — OFFICE VISIT (OUTPATIENT)
Dept: AUDIOLOGY | Facility: CLINIC | Age: 73
End: 2023-05-23

## 2023-05-23 DIAGNOSIS — H90.3 SENSORY HEARING LOSS, BILATERAL: Primary | ICD-10-CM

## 2023-05-29 NOTE — PROGRESS NOTES
Hearing Aid Visit:    Name:  Henry Xiong  :  1950  Age:  67 y o  Date of Evaluation: 23    Henry Xiong is being seen for a hearing aid visit  Patient is fit with [employer] OPN 3 minirite hearing aid(s)  (312)  Right serial number [id number]  Left serial number [id number]  Warranty date: OOW 3/30/2022 (Loss/Damage and repair)  Dome/Earmold: 6 mm open   : Right # 2 60 / Left # 2 60    Action:  1  Removed moderate cerumen R > L via curette w/o incident   2  Cleaned and checked both aids / found to be in good working order via listening check    Recommendations:   1  See attached medical consult form  2   RTO 23 for audio and HA discussion as her aids will be four + years old / advised will need an order for the exam     Meenakshi Dickerson , CCC-A, [place]# [id number]  Clinical Audiologist

## 2023-06-27 ENCOUNTER — OFFICE VISIT (OUTPATIENT)
Dept: PODIATRY | Facility: CLINIC | Age: 73
End: 2023-06-27
Payer: MEDICARE

## 2023-06-27 VITALS — BODY MASS INDEX: 27.46 KG/M2 | WEIGHT: 155 LBS | HEIGHT: 63 IN | RESPIRATION RATE: 16 BRPM

## 2023-06-27 DIAGNOSIS — M20.42 HAMMER TOES OF BOTH FEET: ICD-10-CM

## 2023-06-27 DIAGNOSIS — L60.0 INGROWN TOENAIL: ICD-10-CM

## 2023-06-27 DIAGNOSIS — B35.1 ONYCHOMYCOSIS: ICD-10-CM

## 2023-06-27 DIAGNOSIS — M79.672 PAIN IN BOTH FEET: Primary | ICD-10-CM

## 2023-06-27 DIAGNOSIS — L84 CALLUS: ICD-10-CM

## 2023-06-27 DIAGNOSIS — I70.209 ATHEROSCLEROSIS OF ARTERIES OF EXTREMITIES (HCC): ICD-10-CM

## 2023-06-27 DIAGNOSIS — M20.41 HAMMER TOES OF BOTH FEET: ICD-10-CM

## 2023-06-27 DIAGNOSIS — M79.671 PAIN IN BOTH FEET: Primary | ICD-10-CM

## 2023-06-27 PROCEDURE — 11720 DEBRIDE NAIL 1-5: CPT | Performed by: PODIATRIST

## 2023-06-27 PROCEDURE — 11056 PARNG/CUTG B9 HYPRKR LES 2-4: CPT | Performed by: PODIATRIST

## 2023-06-27 NOTE — PROGRESS NOTES
Assessment /Plan:     Aseptic debridement of pre-trophic hyperkeratotic lesions ×4 plantar feet bilateral  Debridement with #10 scalpel debrided nonviable tissue down to healthy viable tissue      Aseptic debridement and planning of nails x10 and manually and mechanically     Daily foot checks monitor for signs of infection  Follow-up 3 months            Diagnoses and all orders for this visit:     Atherosclerosis of arteries of extremities (HCC)     Pain in both feet     Callus     Deformity of both feet            Subjective:       Patient ID: Talia Jauregui is a 67 y  o  female      Patient has painful hyperkeratotic lesions that hurt when walking wearing shoes   Patient has not noticed any redness or signs of infection         Medical History   No past medical history on file             Current Outpatient Medications:   •  alendronate (FOSAMAX) 70 mg tablet, Take 70 mg by mouth every 7 days, Disp: , Rfl:   •  CALCITONIN, SALMON, NA, into each nostril, Disp: , Rfl:   •  calcium carbonate (OS-YESICA) 600 MG tablet, Take by mouth, Disp: , Rfl:   •  calcium carbonate (OS-YESICA) 600 MG tablet, Take 1 tablet (600 mg total) by mouth 2 (two) times a day with meals, Disp: 180 tablet, Rfl: 3  •  ferrous [id number] C-folic acid (FEROCON) capsule, Take by mouth, Disp: , Rfl:   •  folic acid (FOLVITE) 1 mg tablet, Take by mouth daily, Disp: , Rfl:   •  furosemide (LASIX) 20 mg tablet, TAKE ONE TABLET @ 8AM  DR SAMANIEGO, Disp: 30 tablet, Rfl: 3  •  furosemide (LASIX) 20 mg tablet, TAKE 1 TABLET BY MOUTH DAILY @ 8AM  DR SAMANIEGO, Disp: 30 tablet, Rfl: 5  •  hydroxychloroquine (PLAQUENIL) 200 mg tablet, Take 200 mg by mouth 2 (two) times a day with meals, Disp: , Rfl:   •  lactase (LACTAID) 3,000 units tablet, Take 2 tablets (6,000 Units total) by mouth see administration instructions Take 2 tabs with first bite of dairy as needed  , Disp: 60 tablet, Rfl: 3  •  levETIRAcetam (KEPPRA) 500 mg tablet, , Disp: , Rfl:   •  loratadine (CLARITIN) 10 mg tablet, TAKE 1 TABLET BY MOUTH @ 8PM  DR CHANEY, Disp: 30 tablet, Rfl: 5  •  Multiple Vitamin (TAB-A-ANNELIESE) TABS, Take 1 tablet by mouth daily Take at 8 AM, Disp: 90 tablet, Rfl: 3  •  Multiple Vitamin (TAB-A-ANNELIESE) TABS, TAKE 1 TABLET BY MOUTH DAILY @ 8AM  DR ETIENNE, Disp: 30 tablet, Rfl: 5  •  Multiple Vitamins-Minerals (MULTIVITAL PO), Take by mouth, Disp: , Rfl:   •  PATADAY 0 2 % opth drops, , Disp: , Rfl:   •  PHENobarbital 64 8 mg tablet, Take 64 8 mg by mouth 2 (two) times a day, Disp: , Rfl:   •  pyridoxine (VITAMIN B6) 50 mg tablet, Take 50 mg by mouth daily, Disp: , Rfl:   •  Pyridoxine HCl (VITAMIN B-6) 50 MG TABS, Take by mouth, Disp: , Rfl:   •  ranitidine (ZANTAC) 150 mg tablet, Take 1 tablet (150 mg total) by mouth 2 (two) times a day Take at 8 AM and 8 PM, Disp: 60 tablet, Rfl: 3  •  ranitidine (ZANTAC) 150 mg tablet, TAKE 1 TABLET BY MOUTH DAILY @ 8AM AND @ 8PM, Disp: 60 tablet, Rfl: 5  •  Vitamin D, Cholecalciferol, 400 units TABS, Take 2 tablets (800 Units total) by mouth every morning Take 2 tab daily at 8AM, Disp: 60 tablet, Rfl: 3     Surgical History   No past surgical history on file                  Allergies   Allergen Reactions   • Erythromycin     • Lactose Intolerance (Gi)     • Nabumetone     • Penicillins                 Patient Active Problem List   Diagnosis   • Atherosclerosis of arteries of extremities (HCC)   • Callus   • Deformity of ankle and foot, acquired   • Hammer toes of both feet   • Pain in both feet   • Systemic lupus erythematosus (HCC)         Review of Systems   Constitutional: Negative     HENT: Negative     Eyes: Negative     Respiratory: Negative     Cardiovascular: Negative     Gastrointestinal: Negative     Endocrine: Negative     Genitourinary: Negative     Musculoskeletal: Positive for arthralgias  Skin: Negative     Allergic/Immunologic: Negative     Neurological: Negative     Hematological: Negative     Psychiatric/Behavioral: Negative            Objective:  Patient's shoes and socks were removed, feet examined             Foot Exam     Q, 9 findings bilateral   Abnormal cooling noted bilateral   Negative digital hair of digits     General  General Appearance: appears stated age and healthy         Right Foot/Ankle      Inspection and Palpation  Arch: pes planus  Hammertoes: second toe, fifth toe, fourth toe and third toe  Hallux valgus: yes  Skin Exam: callus, dry skin and skin changes;      Neurovascular  Dorsalis pedis: 1+  Posterior tibial: 1+  Achilles reflex: 1+  Babinski reflex: 1+        Left Foot/Ankle       Inspection and Palpation  Arch: pes planus  Hammertoes: second toe, fifth toe, fourth toe and third toe  Hallux valgus: yes  Skin Exam: callus, dry skin and skin changes;      Neurovascular  Dorsalis pedis: 1+  Posterior tibial: 1+  Achilles reflex: 1+  Babinski reflex: 1+              Right Foot/Ankle   Right Foot Inspection  Skin Exam: dry skin, callus and callus                                 Vascular     The right DP pulse is 1+  The right PT pulse is 1+  Right Toe  - Comprehensive Exam  Arch: pes planus  Hammertoes: second toe, fifth toe, fourth toe and third toe  Hallux valgus: yes     Left Foot/Ankle  Left Foot Inspection  Skin Exam: dry skin and callus                                             Vascular     The left DP pulse is 1+  The left PT pulse is 1+  Left Toe  - Comprehensive Exam  Arch: pes planus  Hammertoes: second toe, fifth toe, fourth toe and third toe  Hallux valgus: yes          Physical Exam   Cardiovascular:   Pulses:       Dorsalis pedis pulses are 1+ on the right side, and 1+ on the left side         Posterior tibial pulses are 1+ on the right side, and 1+ on the left side  Feet:   Right Foot:   Skin Integrity: Positive for callus and dry skin  Left Foot:   Skin Integrity: Positive for callus and dry skin     Neurological:   Reflex Scores:       Achilles reflexes are 1+ on the right side and 1+ on the left side          Rigidly contracted hammertoes 2 through 5 bilateral   Painful hyperkeratotic lesion distal right 3rd digit   Painful hyperkeratotic lesion the distal medial aspect of the left hallux    No open wound or signs of infection  Skin hairless and atrophic vascular changes noted bilateral  Significant bunion hammertoes bilateral     Plus one edema bilateral feet and ankles  Hyperpigmentation lower legs bilateral  Venous stasis bilateral

## 2023-10-02 ENCOUNTER — APPOINTMENT (OUTPATIENT)
Dept: LAB | Facility: CLINIC | Age: 73
End: 2023-10-02
Payer: MEDICARE

## 2023-10-02 DIAGNOSIS — E11.65 TYPE II DIABETES MELLITUS WITH HYPEROSMOLARITY, UNCONTROLLED (HCC): ICD-10-CM

## 2023-10-02 DIAGNOSIS — E11.00 TYPE II DIABETES MELLITUS WITH HYPEROSMOLARITY, UNCONTROLLED (HCC): ICD-10-CM

## 2023-10-02 LAB — CORTIS SERPL-MCNC: 15.1 UG/DL

## 2023-10-02 PROCEDURE — 82533 TOTAL CORTISOL: CPT

## 2023-10-05 ENCOUNTER — OFFICE VISIT (OUTPATIENT)
Age: 73
End: 2023-10-05
Payer: MEDICARE

## 2023-10-05 VITALS — BODY MASS INDEX: 27.46 KG/M2 | HEIGHT: 63 IN | WEIGHT: 155 LBS | RESPIRATION RATE: 16 BRPM

## 2023-10-05 DIAGNOSIS — B35.1 ONYCHOMYCOSIS: ICD-10-CM

## 2023-10-05 DIAGNOSIS — I70.209 ATHEROSCLEROSIS OF ARTERIES OF EXTREMITIES (HCC): Primary | ICD-10-CM

## 2023-10-05 DIAGNOSIS — M79.671 PAIN IN BOTH FEET: ICD-10-CM

## 2023-10-05 DIAGNOSIS — M79.672 PAIN IN BOTH FEET: ICD-10-CM

## 2023-10-05 DIAGNOSIS — L84 CALLUS: ICD-10-CM

## 2023-10-05 PROCEDURE — 11720 DEBRIDE NAIL 1-5: CPT | Performed by: PODIATRIST

## 2023-10-05 PROCEDURE — 11056 PARNG/CUTG B9 HYPRKR LES 2-4: CPT | Performed by: PODIATRIST

## 2023-10-05 NOTE — PROGRESS NOTES
Assessment./Plan:     Aseptic debridement of pre-trophic hyperkeratotic lesions ×4 plantar feet bilateral. Debridement with #10 scalpel debrided nonviable tissue down to healthy viable tissue.     Aseptic debridement and planning of nails x10 and manually and mechanically     Daily foot checks monitor for signs of infection  Follow-up 3 months            Diagnoses and all orders for this visit:     Atherosclerosis of arteries of extremities (HCC)     Pain in both feet     Callus     Deformity of both feet            Subjective:       Patient ID: Talia Jauregui is a 72 y.o. female.     Patient has painful hyperkeratotic lesions that hurt when walking wearing shoes.  Patient has not noticed any redness or signs of infection.        Medical History   No past medical history on file.            Current Outpatient Medications:   •  alendronate (FOSAMAX) 70 mg tablet, Take 70 mg by mouth every 7 days, Disp: , Rfl:   •  CALCITONIN, SALMON, NA, into each nostril, Disp: , Rfl:   •  calcium carbonate (OS-YESICA) 600 MG tablet, Take by mouth, Disp: , Rfl:   •  calcium carbonate (OS-YESICA) 600 MG tablet, Take 1 tablet (600 mg total) by mouth 2 (two) times a day with meals, Disp: 180 tablet, Rfl: 3  •  ferrous [id number] C-folic acid (FEROCON) capsule, Take by mouth, Disp: , Rfl:   •  folic acid (FOLVITE) 1 mg tablet, Take by mouth daily, Disp: , Rfl:   •  furosemide (LASIX) 20 mg tablet, TAKE ONE TABLET @ 8AM .DR. SAMANIEGO, Disp: 30 tablet, Rfl: 3  •  furosemide (LASIX) 20 mg tablet, TAKE 1 TABLET BY MOUTH DAILY @ 8AM .DR. SAMANIEGO, Disp: 30 tablet, Rfl: 5  •  hydroxychloroquine (PLAQUENIL) 200 mg tablet, Take 200 mg by mouth 2 (two) times a day with meals, Disp: , Rfl:   •  lactase (LACTAID) 3,000 units tablet, Take 2 tablets (6,000 Units total) by mouth see administration instructions Take 2 tabs with first bite of dairy as needed. , Disp: 60 tablet, Rfl: 3  •  levETIRAcetam (KEPPRA) 500 mg tablet, , Disp: , Rfl:   •  loratadine (CLARITIN) 10 mg tablet, TAKE 1 TABLET BY MOUTH @ 8PM .DR. CHANEY, Disp: 30 tablet, Rfl: 5  •  Multiple Vitamin (TAB-A-ANNELIESE) TABS, Take 1 tablet by mouth daily Take at 8 AM, Disp: 90 tablet, Rfl: 3  •  Multiple Vitamin (TAB-A-ANNELIESE) TABS, TAKE 1 TABLET BY MOUTH DAILY @ 8AM .DR. ETIENNE, Disp: 30 tablet, Rfl: 5  •  Multiple Vitamins-Minerals (MULTIVITAL PO), Take by mouth, Disp: , Rfl:   •  PATADAY 0.2 % opth drops, , Disp: , Rfl:   •  PHENobarbital 64.8 mg tablet, Take 64.8 mg by mouth 2 (two) times a day, Disp: , Rfl:   •  pyridoxine (VITAMIN B6) 50 mg tablet, Take 50 mg by mouth daily, Disp: , Rfl:   •  Pyridoxine HCl (VITAMIN B-6) 50 MG TABS, Take by mouth, Disp: , Rfl:   •  ranitidine (ZANTAC) 150 mg tablet, Take 1 tablet (150 mg total) by mouth 2 (two) times a day Take at 8 AM and 8 PM, Disp: 60 tablet, Rfl: 3  •  ranitidine (ZANTAC) 150 mg tablet, TAKE 1 TABLET BY MOUTH DAILY @ 8AM AND @ 8PM, Disp: 60 tablet, Rfl: 5  •  Vitamin D, Cholecalciferol, 400 units TABS, Take 2 tablets (800 Units total) by mouth every morning Take 2 tab daily at 8AM, Disp: 60 tablet, Rfl: 3     Surgical History   No past surgical history on file.                 Allergies   Allergen Reactions   • Erythromycin     • Lactose Intolerance (Gi)     • Nabumetone     • Penicillins                 Patient Active Problem List   Diagnosis   • Atherosclerosis of arteries of extremities (HCC)   • Callus   • Deformity of ankle and foot, acquired   • Hammer toes of both feet   • Pain in both feet   • Systemic lupus erythematosus (HCC)         Review of Systems   Constitutional: Negative.    HENT: Negative.    Eyes: Negative.    Respiratory: Negative.    Cardiovascular: Negative.    Gastrointestinal: Negative.    Endocrine: Negative.    Genitourinary: Negative.    Musculoskeletal: Positive for arthralgias. Skin: Negative.    Allergic/Immunologic: Negative.    Neurological: Negative.    Hematological: Negative.    Psychiatric/Behavioral: Negative.           Objective:  Patient's shoes and socks were removed, feet examined.            Foot Exam     Q, 9 findings bilateral.  Abnormal cooling noted bilateral.  Negative digital hair of digits     General  General Appearance: appears stated age and healthy         Right Foot/Ankle      Inspection and Palpation  Arch: pes planus  Hammertoes: second toe, fifth toe, fourth toe and third toe  Hallux valgus: yes  Skin Exam: callus, dry skin and skin changes;      Neurovascular  Dorsalis pedis: 1+  Posterior tibial: 1+  Achilles reflex: 1+  Babinski reflex: 1+        Left Foot/Ankle       Inspection and Palpation  Arch: pes planus  Hammertoes: second toe, fifth toe, fourth toe and third toe  Hallux valgus: yes  Skin Exam: callus, dry skin and skin changes;      Neurovascular  Dorsalis pedis: 1+  Posterior tibial: 1+  Achilles reflex: 1+  Babinski reflex: 1+              Right Foot/Ankle   Right Foot Inspection  Skin Exam: dry skin, callus and callus                                 Vascular     The right DP pulse is 1+. The right PT pulse is 1+. Right Toe  - Comprehensive Exam  Arch: pes planus  Hammertoes: second toe, fifth toe, fourth toe and third toe  Hallux valgus: yes     Left Foot/Ankle  Left Foot Inspection  Skin Exam: dry skin and callus                                             Vascular     The left DP pulse is 1+. The left PT pulse is 1+. Left Toe  - Comprehensive Exam  Arch: pes planus  Hammertoes: second toe, fifth toe, fourth toe and third toe  Hallux valgus: yes          Physical Exam   Cardiovascular:   Pulses:       Dorsalis pedis pulses are 1+ on the right side, and 1+ on the left side.        Posterior tibial pulses are 1+ on the right side, and 1+ on the left side. Feet:   Right Foot:   Skin Integrity: Positive for callus and dry skin. Left Foot:   Skin Integrity: Positive for callus and dry skin.    Neurological:   Reflex Scores:       Achilles reflexes are 1+ on the right side and 1+ on the left side.         Rigidly contracted hammertoes 2 through 5 bilateral.  Painful hyperkeratotic lesion distal right 3rd digit   Painful hyperkeratotic lesion the distal medial aspect of the left hallux.   No open wound or signs of infection  Skin hairless and atrophic vascular changes noted bilateral  Significant bunion hammertoes bilateral     Plus one edema bilateral feet and ankles  Hyperpigmentation lower legs bilateral  Venous stasis bilateral

## 2023-11-22 ENCOUNTER — APPOINTMENT (OUTPATIENT)
Dept: LAB | Facility: CLINIC | Age: 73
End: 2023-11-22
Payer: MEDICARE

## 2023-11-22 DIAGNOSIS — M32.10 LUPOID NEPHRITIS: ICD-10-CM

## 2023-11-22 DIAGNOSIS — Z79.1 ENCOUNTER FOR LONG-TERM (CURRENT) USE OF NON-STEROIDAL ANTI-INFLAMMATORIES: ICD-10-CM

## 2023-11-22 DIAGNOSIS — M81.0 SENILE OSTEOPOROSIS: ICD-10-CM

## 2023-11-22 DIAGNOSIS — R52 GENERALIZED PAIN: ICD-10-CM

## 2023-11-22 DIAGNOSIS — N08 LUPOID NEPHRITIS: ICD-10-CM

## 2023-11-22 DIAGNOSIS — Z79.899 ENCOUNTER FOR LONG-TERM (CURRENT) USE OF OTHER MEDICATIONS: ICD-10-CM

## 2023-11-22 DIAGNOSIS — Z51.11 ENCOUNTER FOR ANTINEOPLASTIC CHEMOTHERAPY: ICD-10-CM

## 2023-11-22 LAB
ANA SER QL IA: NEGATIVE
C3 SERPL-MCNC: 122 MG/DL (ref 87–200)
C4 SERPL-MCNC: 23 MG/DL (ref 19–52)

## 2023-11-22 PROCEDURE — 86225 DNA ANTIBODY NATIVE: CPT

## 2023-11-22 PROCEDURE — 86160 COMPLEMENT ANTIGEN: CPT

## 2023-11-22 PROCEDURE — 36415 COLL VENOUS BLD VENIPUNCTURE: CPT

## 2023-11-22 PROCEDURE — 86038 ANTINUCLEAR ANTIBODIES: CPT

## 2023-11-24 LAB — DSDNA AB SER-ACNC: 1 IU/ML (ref 0–9)

## 2023-12-01 ENCOUNTER — APPOINTMENT (OUTPATIENT)
Dept: LAB | Facility: CLINIC | Age: 73
End: 2023-12-01
Payer: MEDICARE

## 2023-12-28 ENCOUNTER — OFFICE VISIT (OUTPATIENT)
Dept: AUDIOLOGY | Facility: CLINIC | Age: 73
End: 2023-12-28
Payer: MEDICARE

## 2023-12-28 ENCOUNTER — OFFICE VISIT (OUTPATIENT)
Age: 73
End: 2023-12-28
Payer: MEDICARE

## 2023-12-28 VITALS — RESPIRATION RATE: 16 BRPM | HEIGHT: 63 IN | BODY MASS INDEX: 27.46 KG/M2 | WEIGHT: 155 LBS

## 2023-12-28 DIAGNOSIS — H90.3 SENSORY HEARING LOSS, BILATERAL: Primary | ICD-10-CM

## 2023-12-28 DIAGNOSIS — I70.209 ATHEROSCLEROSIS OF ARTERIES OF EXTREMITIES (HCC): Primary | ICD-10-CM

## 2023-12-28 DIAGNOSIS — M79.671 PAIN IN BOTH FEET: ICD-10-CM

## 2023-12-28 DIAGNOSIS — B35.1 ONYCHOMYCOSIS: ICD-10-CM

## 2023-12-28 DIAGNOSIS — M79.672 PAIN IN BOTH FEET: ICD-10-CM

## 2023-12-28 DIAGNOSIS — L84 CALLUS: ICD-10-CM

## 2023-12-28 PROCEDURE — 11721 DEBRIDE NAIL 6 OR MORE: CPT | Performed by: PODIATRIST

## 2023-12-28 PROCEDURE — 92557 COMPREHENSIVE HEARING TEST: CPT | Performed by: AUDIOLOGIST

## 2023-12-28 PROCEDURE — 11056 PARNG/CUTG B9 HYPRKR LES 2-4: CPT | Performed by: PODIATRIST

## 2023-12-28 NOTE — PROGRESS NOTES
Hearing Aid Visit:    Name:  Talia Jauregui  :  1950  Age:  73 y.o.  Date of Evaluation: 23    Talia Jauregui is being seen for a hearing aid visit in conjunction with an annual hearing evaluation.     Patient is fit with [employer] OPN 3 minirite hearing aid(s). (312)  Right serial number [id number]. Left serial number [id number].   Warranty date: OOW 3/30/2022 (Loss/Damage and repair).   Dome/Earmold: 6 mm open   : Right # 2 60 / Left # 2 60    Action:  1. Removed moderate cerumen R > L via curette w/o incident   2. Cleaned and checked both aids / found to be in good working order via listening check    Recommendations:   1. See attached medical consult form (to audiogram)   2. RTO 2024 for a 6 month HA visit     Serena Swann, [facility], [place]# [id number]  Clinical Audiologist

## 2023-12-28 NOTE — PROGRESS NOTES
Assessment./Plan:    Chart reviewed.  PCP notes reviewed.     Aseptic debridement of pre-trophic hyperkeratotic lesions ×4 plantar feet bilateral. Debridement with #10 scalpel debrided nonviable tissue down to healthy viable tissue.     Aseptic debridement and planning of nails x10 and manually and mechanically.  All procedures performed without pain or complication.     Daily foot checks monitor for signs of infection  Follow-up 3 months    Patien and t staff given aftercare instruction.            Diagnoses and all orders for this visit:     Atherosclerosis of arteries of extremities (HCC)     Pain in both feet     Callus     Deformity of both feet            Subjective:       Patient ID: Talia Jauregui is a 72 y.o. female.     Patient has painful hyperkeratotic lesions that hurt when walking wearing shoes.  Patient has not noticed any redness or signs of infection.        Medical History   No past medical history on file.            Current Outpatient Medications:     alendronate (FOSAMAX) 70 mg tablet, Take 70 mg by mouth every 7 days, Disp: , Rfl:     CALCITONIN, SALMON, NA, into each nostril, Disp: , Rfl:     calcium carbonate (OS-YESICA) 600 MG tablet, Take by mouth, Disp: , Rfl:     calcium carbonate (OS-YESICA) 600 MG tablet, Take 1 tablet (600 mg total) by mouth 2 (two) times a day with meals, Disp: 180 tablet, Rfl: 3    ferrous fumarate-b12-vitamic C-folic acid (FEROCON) capsule, Take by mouth, Disp: , Rfl:     folic acid (FOLVITE) 1 mg tablet, Take by mouth daily, Disp: , Rfl:     furosemide (LASIX) 20 mg tablet, TAKE ONE TABLET @ 8AM .DR. SAMANIEGO, Disp: 30 tablet, Rfl: 3    furosemide (LASIX) 20 mg tablet, TAKE 1 TABLET BY MOUTH DAILY @ 8AM .DR. SAMANIEGO, Disp: 30 tablet, Rfl: 5    hydroxychloroquine (PLAQUENIL) 200 mg tablet, Take 200 mg by mouth 2 (two) times a day with meals, Disp: , Rfl:     lactase (LACTAID) 3,000 units tablet, Take 2 tablets (6,000 Units total) by mouth see administration instructions  Take 2 tabs with first bite of dairy as needed., Disp: 60 tablet, Rfl: 3    levETIRAcetam (KEPPRA) 500 mg tablet, , Disp: , Rfl:     loratadine (CLARITIN) 10 mg tablet, TAKE 1 TABLET BY MOUTH @ 8PM .DR. CHANEY, Disp: 30 tablet, Rfl: 5    Multiple Vitamin (TAB-A-ANNELIESE) TABS, Take 1 tablet by mouth daily Take at 8 AM, Disp: 90 tablet, Rfl: 3    Multiple Vitamin (TAB-A-ANNELIESE) TABS, TAKE 1 TABLET BY MOUTH DAILY @ 8AM .DR. ETIENNE, Disp: 30 tablet, Rfl: 5    Multiple Vitamins-Minerals (MULTIVITAL PO), Take by mouth, Disp: , Rfl:     PATADAY 0.2 % opth drops, , Disp: , Rfl:     PHENobarbital 64.8 mg tablet, Take 64.8 mg by mouth 2 (two) times a day, Disp: , Rfl:     pyridoxine (VITAMIN B6) 50 mg tablet, Take 50 mg by mouth daily, Disp: , Rfl:     Pyridoxine HCl (VITAMIN B-6) 50 MG TABS, Take by mouth, Disp: , Rfl:     ranitidine (ZANTAC) 150 mg tablet, Take 1 tablet (150 mg total) by mouth 2 (two) times a day Take at 8 AM and 8 PM, Disp: 60 tablet, Rfl: 3    ranitidine (ZANTAC) 150 mg tablet, TAKE 1 TABLET BY MOUTH DAILY @ 8AM AND @ 8PM, Disp: 60 tablet, Rfl: 5    Vitamin D, Cholecalciferol, 400 units TABS, Take 2 tablets (800 Units total) by mouth every morning Take 2 tab daily at 8AM, Disp: 60 tablet, Rfl: 3     Surgical History   No past surgical history on file.                 Allergies   Allergen Reactions    Erythromycin      Lactose Intolerance (Gi)      Nabumetone      Penicillins                 Patient Active Problem List   Diagnosis    Atherosclerosis of arteries of extremities (HCC)    Callus    Deformity of ankle and foot, acquired    Hammer toes of both feet    Pain in both feet    Systemic lupus erythematosus (HCC)         Review of Systems   Constitutional: Negative.    HENT: Negative.    Eyes: Negative.    Respiratory: Negative.    Cardiovascular: Negative.    Gastrointestinal: Negative.    Endocrine: Negative.    Genitourinary: Negative.    Musculoskeletal: Positive for arthralgias.   Skin: Negative.     Allergic/Immunologic: Negative.    Neurological: Negative.    Hematological: Negative.    Psychiatric/Behavioral: Negative.           Objective:  Patient's shoes and socks were removed, feet examined.            Foot Exam     Q, 9 findings bilateral.  Abnormal cooling noted bilateral.  Negative digital hair of digits     General  General Appearance: appears stated age and healthy         Right Foot/Ankle      Inspection and Palpation  Arch: pes planus  Hammertoes: second toe, fifth toe, fourth toe and third toe  Hallux valgus: yes  Skin Exam: callus, dry skin and skin changes;      Neurovascular  Dorsalis pedis: 1+  Posterior tibial: 1+  Achilles reflex: 1+  Babinski reflex: 1+        Left Foot/Ankle       Inspection and Palpation  Arch: pes planus  Hammertoes: second toe, fifth toe, fourth toe and third toe  Hallux valgus: yes  Skin Exam: callus, dry skin and skin changes;      Neurovascular  Dorsalis pedis: 1+  Posterior tibial: 1+  Achilles reflex: 1+  Babinski reflex: 1+              Right Foot/Ankle   Right Foot Inspection  Skin Exam: dry skin, callus and callus                                 Vascular     The right DP pulse is 1+. The right PT pulse is 1+.   Right Toe  - Comprehensive Exam  Arch: pes planus  Hammertoes: second toe, fifth toe, fourth toe and third toe  Hallux valgus: yes     Left Foot/Ankle  Left Foot Inspection  Skin Exam: dry skin and callus                                             Vascular     The left DP pulse is 1+. The left PT pulse is 1+.   Left Toe  - Comprehensive Exam  Arch: pes planus  Hammertoes: second toe, fifth toe, fourth toe and third toe  Hallux valgus: yes          Physical Exam   Cardiovascular:   Pulses:       Dorsalis pedis pulses are 1+ on the right side, and 1+ on the left side.        Posterior tibial pulses are 1+ on the right side, and 1+ on the left side.   Feet:   Right Foot:   Skin Integrity: Positive for callus and dry skin.   Left Foot:   Skin Integrity:  Positive for callus and dry skin.   Neurological:   Reflex Scores:       Achilles reflexes are 1+ on the right side and 1+ on the left side.         Rigidly contracted hammertoes 2 through 5 bilateral.  Painful hyperkeratotic lesion distal right 3rd digit   Painful hyperkeratotic lesion the distal medial aspect of the left hallux.   No open wound or signs of infection  Skin hairless and atrophic vascular changes noted bilateral  Significant bunion hammertoes bilateral     Plus one edema bilateral feet and ankles  Hyperpigmentation lower legs bilateral  Venous stasis bilateral

## 2023-12-29 NOTE — PROGRESS NOTES
HEARING EVALUATION    Name:  Talia Jauregui  :  1950  Age:  73 y.o.   Date of Evaluation: 2023    History:  Known bilateral hearing loss with use of [facility] HA's fit in   Reason for visit: Talia Jauregui is being seen today at the request of Dr. Godinez for an annual evaluation of hearing per [employer] guidelines for the patient's plan of care.  Low level tinnitus is described by the patient.  She is currently ambulating with the assistance of a cane.      EVALUATION:    Otoscopic Evaluation:   Right Ear:  canal cleared  of moderate cerumen via curette w/o incident    Left Ear:  canal cleared    of moderate cerumen via curette w/o incident     Audiogram Results:  Within or bordering normal limits, 250-1500 Hz, sloping to a mild to moderately severe HFSHL bilaterally.   Word recognition scores, in quiet, are 100% for the right ear and 96% for the left ear.        *see attached audiogram    Thresholds are stable to previous testing in 2022    RECOMMENDATIONS:  1. Continued daily use of hearing aids, during work program and in the evenings with dinner or TV time.    2. Annual hearing evaluations for monitoring purposes     PATIENT EDUCATION:   Discussed results and recommendations with Ms. Jauregui and her caregiver DELILAH Sarah.  Questions were addressed and the patient's caregiver was encouraged to contact our department should concerns arise.    Serena Swann, [facility], [place]# [id number]  Clinical Audiologist

## 2024-01-03 ENCOUNTER — APPOINTMENT (OUTPATIENT)
Dept: LAB | Facility: CLINIC | Age: 74
End: 2024-01-03
Payer: MEDICARE

## 2024-01-03 DIAGNOSIS — E11.00 TYPE II DIABETES MELLITUS WITH HYPEROSMOLARITY, UNCONTROLLED (HCC): ICD-10-CM

## 2024-01-03 DIAGNOSIS — E11.65 TYPE II DIABETES MELLITUS WITH HYPEROSMOLARITY, UNCONTROLLED (HCC): ICD-10-CM

## 2024-01-03 LAB — TSH SERPL DL<=0.05 MIU/L-ACNC: 3.36 UIU/ML (ref 0.45–4.5)

## 2024-01-03 PROCEDURE — 84443 ASSAY THYROID STIM HORMONE: CPT

## 2024-03-04 ENCOUNTER — OFFICE VISIT (OUTPATIENT)
Age: 74
End: 2024-03-04
Payer: MEDICARE

## 2024-03-04 VITALS — RESPIRATION RATE: 16 BRPM | HEIGHT: 63 IN | BODY MASS INDEX: 27.46 KG/M2 | WEIGHT: 155 LBS

## 2024-03-04 DIAGNOSIS — B35.1 ONYCHOMYCOSIS: ICD-10-CM

## 2024-03-04 DIAGNOSIS — M79.671 PAIN IN BOTH FEET: ICD-10-CM

## 2024-03-04 DIAGNOSIS — I70.209 ATHEROSCLEROSIS OF ARTERIES OF EXTREMITIES (HCC): Primary | ICD-10-CM

## 2024-03-04 DIAGNOSIS — M79.672 PAIN IN BOTH FEET: ICD-10-CM

## 2024-03-04 DIAGNOSIS — L84 CALLUS: ICD-10-CM

## 2024-03-04 PROCEDURE — 11720 DEBRIDE NAIL 1-5: CPT | Performed by: PODIATRIST

## 2024-03-04 PROCEDURE — 11056 PARNG/CUTG B9 HYPRKR LES 2-4: CPT | Performed by: PODIATRIST

## 2024-03-27 ENCOUNTER — APPOINTMENT (OUTPATIENT)
Dept: LAB | Facility: CLINIC | Age: 74
End: 2024-03-27
Payer: MEDICARE

## 2024-04-08 ENCOUNTER — OFFICE VISIT (OUTPATIENT)
Age: 74
End: 2024-04-08
Payer: MEDICARE

## 2024-04-08 VITALS — RESPIRATION RATE: 17 BRPM | BODY MASS INDEX: 27.46 KG/M2 | WEIGHT: 155 LBS | HEIGHT: 63 IN

## 2024-04-08 DIAGNOSIS — M21.961 DEFORMITY OF BOTH FEET: ICD-10-CM

## 2024-04-08 DIAGNOSIS — M20.41 HAMMER TOES OF BOTH FEET: Primary | ICD-10-CM

## 2024-04-08 DIAGNOSIS — M21.962 DEFORMITY OF BOTH FEET: ICD-10-CM

## 2024-04-08 DIAGNOSIS — M20.42 HAMMER TOES OF BOTH FEET: Primary | ICD-10-CM

## 2024-04-08 PROCEDURE — 99212 OFFICE O/P EST SF 10 MIN: CPT | Performed by: PODIATRIST

## 2024-04-08 NOTE — PROGRESS NOTES
Assessment./Plan:     Chart reviewed.  PCP notes reviewed.     Aseptic debridement of pre-trophic hyperkeratotic lesions ×4 plantar feet bilateral. Debridement with #10 scalpel debrided nonviable tissue down to healthy viable tissue.     Aseptic debridement and planning of nails x10 and manually and mechanically.  All procedures performed without pain or complication.     Daily foot checks monitor for signs of infection  Follow-up 3 months     Patien and t staff given aftercare instruction..  Patient will consider hammertoe surgery to correct deformity/toe position causing corn formation.            Diagnoses and all orders for this visit:     Atherosclerosis of arteries of extremities (HCC)     Pain in both feet     Callus     Deformity of both feet            Subjective:       Patient ID: Talia Jauregui is a 73 y.o. female.     Patient has painful hyperkeratotic lesions that hurt when walking wearing shoes.  Patient has not noticed any redness or signs of infection.        Medical History   No past medical history on file.            Current Outpatient Medications:     alendronate (FOSAMAX) 70 mg tablet, Take 70 mg by mouth every 7 days, Disp: , Rfl:     CALCITONIN, SALMON, NA, into each nostril, Disp: , Rfl:     calcium carbonate (OS-YESICA) 600 MG tablet, Take by mouth, Disp: , Rfl:     calcium carbonate (OS-YESICA) 600 MG tablet, Take 1 tablet (600 mg total) by mouth 2 (two) times a day with meals, Disp: 180 tablet, Rfl: 3    ferrous fumarate-b12-vitamic C-folic acid (FEROCON) capsule, Take by mouth, Disp: , Rfl:     folic acid (FOLVITE) 1 mg tablet, Take by mouth daily, Disp: , Rfl:     furosemide (LASIX) 20 mg tablet, TAKE ONE TABLET @ 8AM .DR. SAMANIEGO, Disp: 30 tablet, Rfl: 3    furosemide (LASIX) 20 mg tablet, TAKE 1 TABLET BY MOUTH DAILY @ 8AM .DR. SAMANIEGO, Disp: 30 tablet, Rfl: 5    hydroxychloroquine (PLAQUENIL) 200 mg tablet, Take 200 mg by mouth 2 (two) times a day with meals, Disp: , Rfl:     lactase  (LACTAID) 3,000 units tablet, Take 2 tablets (6,000 Units total) by mouth see administration instructions Take 2 tabs with first bite of dairy as needed., Disp: 60 tablet, Rfl: 3    levETIRAcetam (KEPPRA) 500 mg tablet, , Disp: , Rfl:     loratadine (CLARITIN) 10 mg tablet, TAKE 1 TABLET BY MOUTH @ 8PM .DR. CHANEY, Disp: 30 tablet, Rfl: 5    Multiple Vitamin (TAB-A-ANNELIESE) TABS, Take 1 tablet by mouth daily Take at 8 AM, Disp: 90 tablet, Rfl: 3    Multiple Vitamin (TAB-A-ANNELIESE) TABS, TAKE 1 TABLET BY MOUTH DAILY @ 8AM .DR. ETIENNE, Disp: 30 tablet, Rfl: 5    Multiple Vitamins-Minerals (MULTIVITAL PO), Take by mouth, Disp: , Rfl:     PATADAY 0.2 % opth drops, , Disp: , Rfl:     PHENobarbital 64.8 mg tablet, Take 64.8 mg by mouth 2 (two) times a day, Disp: , Rfl:     pyridoxine (VITAMIN B6) 50 mg tablet, Take 50 mg by mouth daily, Disp: , Rfl:     Pyridoxine HCl (VITAMIN B-6) 50 MG TABS, Take by mouth, Disp: , Rfl:     ranitidine (ZANTAC) 150 mg tablet, Take 1 tablet (150 mg total) by mouth 2 (two) times a day Take at 8 AM and 8 PM, Disp: 60 tablet, Rfl: 3    ranitidine (ZANTAC) 150 mg tablet, TAKE 1 TABLET BY MOUTH DAILY @ 8AM AND @ 8PM, Disp: 60 tablet, Rfl: 5    Vitamin D, Cholecalciferol, 400 units TABS, Take 2 tablets (800 Units total) by mouth every morning Take 2 tab daily at 8AM, Disp: 60 tablet, Rfl: 3     Surgical History   No past surgical history on file.                 Allergies   Allergen Reactions    Erythromycin      Lactose Intolerance (Gi)      Nabumetone      Penicillins                 Patient Active Problem List   Diagnosis    Atherosclerosis of arteries of extremities (HCC)    Callus    Deformity of ankle and foot, acquired    Hammer toes of both feet    Pain in both feet    Systemic lupus erythematosus (HCC)         Review of Systems   Constitutional: Negative.    HENT: Negative.    Eyes: Negative.    Respiratory: Negative.    Cardiovascular: Negative.    Gastrointestinal: Negative.    Endocrine:  Negative.    Genitourinary: Negative.    Musculoskeletal: Positive for arthralgias.   Skin: Negative.    Allergic/Immunologic: Negative.    Neurological: Negative.    Hematological: Negative.    Psychiatric/Behavioral: Negative.           Objective:  Patient's shoes and socks were removed, feet examined.            Foot Exam     Q, 9 findings bilateral.  Abnormal cooling noted bilateral.  Negative digital hair of digits     General  General Appearance: appears stated age and healthy         Right Foot/Ankle      Inspection and Palpation  Arch: pes planus  Hammertoes: second toe, fifth toe, fourth toe and third toe  Hallux valgus: yes  Skin Exam: callus, dry skin and skin changes;      Neurovascular  Dorsalis pedis: 1+  Posterior tibial: 1+  Achilles reflex: 1+  Babinski reflex: 1+        Left Foot/Ankle       Inspection and Palpation  Arch: pes planus  Hammertoes: second toe, fifth toe, fourth toe and third toe  Hallux valgus: yes  Skin Exam: callus, dry skin and skin changes;      Neurovascular  Dorsalis pedis: 1+  Posterior tibial: 1+  Achilles reflex: 1+  Babinski reflex: 1+              Right Foot/Ankle   Right Foot Inspection  Skin Exam: dry skin, callus and callus                                 Vascular     The right DP pulse is 1+. The right PT pulse is 1+.   Right Toe  - Comprehensive Exam  Arch: pes planus  Hammertoes: second toe, fifth toe, fourth toe and third toe  Hallux valgus: yes     Left Foot/Ankle  Left Foot Inspection  Skin Exam: dry skin and callus                                             Vascular     The left DP pulse is 1+. The left PT pulse is 1+.   Left Toe  - Comprehensive Exam  Arch: pes planus  Hammertoes: second toe, fifth toe, fourth toe and third toe  Hallux valgus: yes          Physical Exam   Cardiovascular:   Pulses:       Dorsalis pedis pulses are 1+ on the right side, and 1+ on the left side.        Posterior tibial pulses are 1+ on the right side, and 1+ on the left side.    Feet:   Right Foot:   Skin Integrity: Positive for callus and dry skin.   Left Foot:   Skin Integrity: Positive for callus and dry skin.   Neurological:   Reflex Scores:       Achilles reflexes are 1+ on the right side and 1+ on the left side.         Rigidly contracted hammertoes 2 through 5 bilateral.  Painful hyperkeratotic lesion distal right 3rd digit   Painful hyperkeratotic lesion the distal medial aspect of the left hallux.   No open wound or signs of infection  Skin hairless and atrophic vascular changes noted bilateral  Significant bunion hammertoes bilateral     Plus one edema bilateral feet and ankles  Hyperpigmentation lower legs bilateral  Venous stasis bilateral

## 2024-05-29 ENCOUNTER — APPOINTMENT (OUTPATIENT)
Dept: LAB | Facility: CLINIC | Age: 74
End: 2024-05-29
Payer: MEDICARE

## 2024-06-13 ENCOUNTER — TELEPHONE (OUTPATIENT)
Age: 74
End: 2024-06-13

## 2024-06-13 NOTE — TELEPHONE ENCOUNTER
Lmom for staff in regards to patient , per medicare guideline patient needs to see her PCP with in 6 months , we recommended patient schedule a visit with PCP if she has not been see with in 6 months .

## 2024-06-25 ENCOUNTER — TELEPHONE (OUTPATIENT)
Age: 74
End: 2024-06-25

## 2024-06-25 NOTE — TELEPHONE ENCOUNTER
Lmom for staff to call back in regards to patient , per medicare guidelines patient would need to see there PCP with in 6 months ,

## 2024-07-05 ENCOUNTER — OFFICE VISIT (OUTPATIENT)
Age: 74
End: 2024-07-05

## 2024-07-05 VITALS — HEIGHT: 63 IN | BODY MASS INDEX: 27.46 KG/M2

## 2024-07-05 DIAGNOSIS — M79.672 PAIN IN BOTH FEET: ICD-10-CM

## 2024-07-05 DIAGNOSIS — M79.671 PAIN IN BOTH FEET: ICD-10-CM

## 2024-07-05 DIAGNOSIS — L84 CALLUS: ICD-10-CM

## 2024-07-05 DIAGNOSIS — I70.209 ATHEROSCLEROSIS OF ARTERIES OF EXTREMITIES (HCC): Primary | ICD-10-CM

## 2024-07-05 DIAGNOSIS — B35.1 ONYCHOMYCOSIS: ICD-10-CM

## 2024-08-01 ENCOUNTER — OFFICE VISIT (OUTPATIENT)
Dept: AUDIOLOGY | Facility: CLINIC | Age: 74
End: 2024-08-01

## 2024-08-01 DIAGNOSIS — H90.3 SENSORY HEARING LOSS, BILATERAL: Primary | ICD-10-CM

## 2024-08-06 NOTE — PROGRESS NOTES
Hearing Aid Visit:    Name:  Talia Jauregui  :  1950  Age:  73 y.o.  Date of Evaluation:  2024    Talia Jauregui is being seen for a hearing aid visit.   It has been 7 months since her last visit.   Naty MAZARIEGOS Accompanied Talia to today's visit.  No issues are reported with the devices.       Patient is fit with [employer] OPN 3 minirite hearing aid(s). (312)  Right serial number [id number]. Left serial number [id number].   Warranty date: OOW 3/30/2022 (Loss/Damage and repair).   Dome/Earmold: 6 mm open   : Right # 2 60 / Left # 2 60    Action:  1. Removed mild cerumen via curette w/o incident   2. Cleaned and checked both aids / changed domes, wax guards and canal locks   3. Found to be in good working order via listening check    Recommendations:   1. See attached medical consult form   2. RTO 2024 for an annual hearing evaluation and HA check   3. Proceed with updated amplification at that time     Serena Swann, [facility], [place]# [id number]  Clinical Audiologist

## 2024-09-14 ENCOUNTER — APPOINTMENT (EMERGENCY)
Dept: RADIOLOGY | Facility: HOSPITAL | Age: 74
End: 2024-09-14
Payer: MEDICARE

## 2024-09-14 ENCOUNTER — HOSPITAL ENCOUNTER (EMERGENCY)
Facility: HOSPITAL | Age: 74
Discharge: HOME | End: 2024-09-14
Attending: EMERGENCY MEDICINE
Payer: MEDICARE

## 2024-09-14 VITALS
DIASTOLIC BLOOD PRESSURE: 83 MMHG | WEIGHT: 163.14 LBS | RESPIRATION RATE: 16 BRPM | TEMPERATURE: 98 F | OXYGEN SATURATION: 95 % | HEART RATE: 82 BPM | SYSTOLIC BLOOD PRESSURE: 145 MMHG | BODY MASS INDEX: 28.9 KG/M2

## 2024-09-14 DIAGNOSIS — S00.83XA CONTUSION OF FACE, INITIAL ENCOUNTER: Primary | ICD-10-CM

## 2024-09-14 PROCEDURE — 72125 CT NECK SPINE W/O DYE: CPT

## 2024-09-14 PROCEDURE — 90715 TDAP VACCINE 7 YRS/> IM: CPT | Performed by: EMERGENCY MEDICINE

## 2024-09-14 PROCEDURE — 70450 CT HEAD/BRAIN W/O DYE: CPT

## 2024-09-14 PROCEDURE — 72128 CT CHEST SPINE W/O DYE: CPT

## 2024-09-14 PROCEDURE — 99284 EMERGENCY DEPT VISIT MOD MDM: CPT

## 2024-09-14 PROCEDURE — 90471 IMMUNIZATION ADMIN: CPT

## 2024-09-14 PROCEDURE — 99284 EMERGENCY DEPT VISIT MOD MDM: CPT | Performed by: EMERGENCY MEDICINE

## 2024-09-14 PROCEDURE — 70486 CT MAXILLOFACIAL W/O DYE: CPT

## 2024-09-14 RX ORDER — GINSENG 100 MG
1 CAPSULE ORAL ONCE
Status: COMPLETED | OUTPATIENT
Start: 2024-09-14 | End: 2024-09-14

## 2024-09-14 RX ORDER — ACETAMINOPHEN 325 MG/1
650 TABLET ORAL ONCE
Status: COMPLETED | OUTPATIENT
Start: 2024-09-14 | End: 2024-09-14

## 2024-09-14 RX ADMIN — TETANUS TOXOID, REDUCED DIPHTHERIA TOXOID AND ACELLULAR PERTUSSIS VACCINE, ADSORBED 0.5 ML: 5; 2.5; 8; 8; 2.5 SUSPENSION INTRAMUSCULAR at 17:30

## 2024-09-14 RX ADMIN — BACITRACIN ZINC 1 SMALL APPLICATION: 500 OINTMENT TOPICAL at 19:22

## 2024-09-14 RX ADMIN — ACETAMINOPHEN 650 MG: 325 TABLET ORAL at 19:22

## 2024-09-14 NOTE — ED PROVIDER NOTES
1. Contusion of face, initial encounter      ED Disposition       ED Disposition   Discharge    Condition   Stable    Date/Time   Sat Sep 14, 2024  7:46 PM    Comment   Talia Jauregui discharge to home/self care.                   Assessment & Plan       Medical Decision Making  74-year-old female tripped fell onto the sidewalk striking her face.  No loss of consciousness.  Currently only complaint is pain around her facial area.  Patient not on anticoagulation concern for trauma to her face head and cervical spine.  No CT appropriately no other obvious signs of injury.  No other complaints.    Amount and/or Complexity of Data Reviewed  Radiology: ordered.    Risk  OTC drugs.  Prescription drug management.                       Medications   tetanus-diphtheria-acellular pertussis (BOOSTRIX) IM injection 0.5 mL (0.5 mL Intramuscular Given 9/14/24 1730)   acetaminophen (TYLENOL) tablet 650 mg (650 mg Oral Given 9/14/24 1922)   bacitracin topical ointment 1 small application (1 small application Topical Given 9/14/24 1922)       History of Present Illness       74-year-old female states she was walking along caught her foot on the sidewalk tripped and fell forward landing on her face causing abrasions to her nose upper lip and lower lip.  Does have possible some ecchymosis around the left orbit.  Does not complain of any pain except in her face area no other complaints of injury or signs of injury on her chest abdomen pelvis no lower extremity abrasions swelling or deformity.  Patient not on any anticoagulation or other reasons for further injury.  Does have a history of seizures takes Keppra for that states she has been compliant with her medications at the home she lives at            Review of Systems   Constitutional:  Negative for activity change, chills, diaphoresis and fever.   HENT:  Negative for congestion, ear pain, nosebleeds, sore throat, trouble swallowing and voice change.    Eyes:  Negative for pain,  discharge and redness.   Respiratory:  Negative for apnea, cough, choking, shortness of breath, wheezing and stridor.    Cardiovascular:  Negative for chest pain and palpitations.   Gastrointestinal:  Negative for abdominal distention, abdominal pain, constipation, diarrhea, nausea and vomiting.   Endocrine: Negative for polydipsia.   Genitourinary:  Negative for difficulty urinating, dysuria, flank pain, frequency, hematuria and urgency.   Musculoskeletal:  Negative for back pain, gait problem, joint swelling, myalgias, neck pain and neck stiffness.   Skin:  Positive for wound. Negative for pallor and rash.   Neurological:  Negative for dizziness, tremors, syncope, speech difficulty, weakness, numbness and headaches.   Hematological:  Negative for adenopathy.   Psychiatric/Behavioral:  Negative for confusion, hallucinations, self-injury and suicidal ideas. The patient is not nervous/anxious.            Objective     ED Triage Vitals [09/14/24 1715]   Temperature Pulse Blood Pressure Respirations SpO2 Patient Position - Orthostatic VS   98 °F (36.7 °C) 90 133/81 18 96 % --      Temp Source Heart Rate Source BP Location FiO2 (%) Pain Score    Tympanic Monitor Left arm -- 2        Physical Exam  Vitals and nursing note reviewed.   Constitutional:       General: She is not in acute distress.     Appearance: She is well-developed. She is not diaphoretic.   HENT:      Head: Normocephalic and atraumatic.      Right Ear: External ear normal.      Left Ear: External ear normal.      Nose: Nose normal.   Eyes:      Conjunctiva/sclera: Conjunctivae normal.      Pupils: Pupils are equal, round, and reactive to light.   Cardiovascular:      Rate and Rhythm: Normal rate and regular rhythm.      Heart sounds: Normal heart sounds.   Pulmonary:      Effort: Pulmonary effort is normal.      Breath sounds: Normal breath sounds.   Abdominal:      General: Bowel sounds are normal.      Palpations: Abdomen is soft.      Tenderness:  There is no abdominal tenderness.   Musculoskeletal:         General: Normal range of motion.      Cervical back: Normal range of motion and neck supple.   Skin:     General: Skin is warm and dry.      Findings: Lesion present.      Comments: Patient has abrasions to upper lip lower lip nothing that appears to be suturable.  Slight ecchymosis around the left orbit lower rim.  Does not complain of pain in the area does not have any obvious injury inside her mouth or teeth.   Neurological:      Mental Status: She is alert and oriented to person, place, and time.      Deep Tendon Reflexes: Reflexes are normal and symmetric.   Psychiatric:         Behavior: Behavior is cooperative.         Labs Reviewed - No data to display  CT thoracic spine without contrast   Final Interpretation by Kurt Mcneal MD (09/14 1933)      No acute fracture or subluxation.            Workstation performed: [id number]         CT head without contrast   Final Interpretation by Kurt Mcneal MD (09/14 1905)      No intracranial hemorrhage or calvarial fracture.                  Workstation performed: [id number]         CT cervical spine without contrast   Final Interpretation by Kurt Mcneal MD (09/14 1916)      No cervical spine fracture or traumatic malalignment.                  Workstation performed: [id number]         CT facial bones without contrast   Final Interpretation by Kurt Mcneal MD (09/14 1911)      No acute maxillofacial fracture identified.   Intact globes/orbits. No retrobulbar hematoma.               Workstation performed: [id number]             Jacky Schultz DO  09/1950

## 2024-09-14 NOTE — DISCHARGE INSTRUCTIONS
PLEASE USE BACITRACIN TWICE DAILY FOR THE NEXT 3 DAYS.  8AM AND 8PM.  You can call medical records to get copies made of the ct scans.    right LE/weight-bearing as tolerated

## 2024-10-04 ENCOUNTER — OFFICE VISIT (OUTPATIENT)
Age: 74
End: 2024-10-04
Payer: MEDICARE

## 2024-10-04 VITALS — WEIGHT: 163 LBS | BODY MASS INDEX: 28.88 KG/M2 | HEIGHT: 63 IN | RESPIRATION RATE: 16 BRPM

## 2024-10-04 DIAGNOSIS — M79.671 PAIN IN BOTH FEET: ICD-10-CM

## 2024-10-04 DIAGNOSIS — M79.672 PAIN IN BOTH FEET: ICD-10-CM

## 2024-10-04 DIAGNOSIS — L84 CALLUS: ICD-10-CM

## 2024-10-04 DIAGNOSIS — I70.209 ATHEROSCLEROSIS OF ARTERIES OF EXTREMITIES (HCC): Primary | ICD-10-CM

## 2024-10-04 DIAGNOSIS — B35.1 ONYCHOMYCOSIS: ICD-10-CM

## 2024-10-04 PROCEDURE — 11721 DEBRIDE NAIL 6 OR MORE: CPT | Performed by: PODIATRIST

## 2024-10-04 PROCEDURE — 11056 PARNG/CUTG B9 HYPRKR LES 2-4: CPT | Performed by: PODIATRIST

## 2024-10-04 PROCEDURE — RECHECK: Performed by: PODIATRIST

## 2024-12-30 ENCOUNTER — OFFICE VISIT (OUTPATIENT)
Dept: AUDIOLOGY | Facility: CLINIC | Age: 74
End: 2024-12-30
Payer: MEDICARE

## 2024-12-30 DIAGNOSIS — H90.3 SENSORY HEARING LOSS, BILATERAL: Primary | ICD-10-CM

## 2024-12-30 PROCEDURE — 92567 TYMPANOMETRY: CPT | Performed by: AUDIOLOGIST

## 2024-12-30 PROCEDURE — 92593 HB HEARING AID CHECK BOTH EARS: CPT | Performed by: AUDIOLOGIST

## 2024-12-30 PROCEDURE — 92557 COMPREHENSIVE HEARING TEST: CPT | Performed by: AUDIOLOGIST

## 2025-01-03 ENCOUNTER — OFFICE VISIT (OUTPATIENT)
Age: 75
End: 2025-01-03

## 2025-01-03 VITALS — BODY MASS INDEX: 28.88 KG/M2 | RESPIRATION RATE: 16 BRPM | HEIGHT: 63 IN | HEART RATE: 80 BPM | WEIGHT: 163 LBS

## 2025-01-03 DIAGNOSIS — I70.209 ATHEROSCLEROSIS OF ARTERIES OF EXTREMITIES (HCC): Primary | ICD-10-CM

## 2025-01-03 DIAGNOSIS — M79.672 PAIN IN BOTH FEET: ICD-10-CM

## 2025-01-03 DIAGNOSIS — B35.1 ONYCHOMYCOSIS: ICD-10-CM

## 2025-01-03 DIAGNOSIS — L84 CALLUS: ICD-10-CM

## 2025-01-03 DIAGNOSIS — M79.671 PAIN IN BOTH FEET: ICD-10-CM

## 2025-01-03 NOTE — PROGRESS NOTES
Assessment./Plan:     Chart reviewed.  PCP notes reviewed.     Aseptic debridement of pre-trophic hyperkeratotic lesions ×4 plantar feet bilateral. Debridement with #10 scalpel debrided nonviable tissue down to healthy viable tissue.     Aseptic debridement and planning of nails x10 and manually and mechanically.  All procedures performed without pain or complication.     Daily foot checks monitor for signs of infection  Follow-up 3 months     Patien and t staff given aftercare instruction..  Patient will consider hammertoe surgery to correct deformity/toe position causing corn formation.            Diagnoses and all orders for this visit:     Atherosclerosis of arteries of extremities (HCC)     Pain in both feet     Callus     Deformity of both feet            Subjective:       Patient ID: Talia Jauregui is a 74 y.o. female.     Patient has painful hyperkeratotic lesions that hurt when walking wearing shoes.  Patient has not noticed any redness or signs of infection.        Medical History   No past medical history on file.            Current Outpatient Medications:     alendronate (FOSAMAX) 70 mg tablet, Take 70 mg by mouth every 7 days, Disp: , Rfl:     CALCITONIN, SALMON, NA, into each nostril, Disp: , Rfl:     calcium carbonate (OS-YESICA) 600 MG tablet, Take by mouth, Disp: , Rfl:     calcium carbonate (OS-YESICA) 600 MG tablet, Take 1 tablet (600 mg total) by mouth 2 (two) times a day with meals, Disp: 180 tablet, Rfl: 3    ferrous fumarate-b12-vitamic C-folic acid (FEROCON) capsule, Take by mouth, Disp: , Rfl:     folic acid (FOLVITE) 1 mg tablet, Take by mouth daily, Disp: , Rfl:     furosemide (LASIX) 20 mg tablet, TAKE ONE TABLET @ 8AM .DR. SAAMNIEGO, Disp: 30 tablet, Rfl: 3    furosemide (LASIX) 20 mg tablet, TAKE 1 TABLET BY MOUTH DAILY @ 8AM .DR. SAMANIEGO, Disp: 30 tablet, Rfl: 5    hydroxychloroquine (PLAQUENIL) 200 mg tablet, Take 200 mg by mouth 2 (two) times a day with meals, Disp: , Rfl:     lactase  (LACTAID) 3,000 units tablet, Take 2 tablets (6,000 Units total) by mouth see administration instructions Take 2 tabs with first bite of dairy as needed., Disp: 60 tablet, Rfl: 3    levETIRAcetam (KEPPRA) 500 mg tablet, , Disp: , Rfl:     loratadine (CLARITIN) 10 mg tablet, TAKE 1 TABLET BY MOUTH @ 8PM .DR. CHANEY, Disp: 30 tablet, Rfl: 5    Multiple Vitamin (TAB-A-ANNELIESE) TABS, Take 1 tablet by mouth daily Take at 8 AM, Disp: 90 tablet, Rfl: 3    Multiple Vitamin (TAB-A-ANNELIESE) TABS, TAKE 1 TABLET BY MOUTH DAILY @ 8AM .DR. ETIENNE, Disp: 30 tablet, Rfl: 5    Multiple Vitamins-Minerals (MULTIVITAL PO), Take by mouth, Disp: , Rfl:     PATADAY 0.2 % opth drops, , Disp: , Rfl:     PHENobarbital 64.8 mg tablet, Take 64.8 mg by mouth 2 (two) times a day, Disp: , Rfl:     pyridoxine (VITAMIN B6) 50 mg tablet, Take 50 mg by mouth daily, Disp: , Rfl:     Pyridoxine HCl (VITAMIN B-6) 50 MG TABS, Take by mouth, Disp: , Rfl:     ranitidine (ZANTAC) 150 mg tablet, Take 1 tablet (150 mg total) by mouth 2 (two) times a day Take at 8 AM and 8 PM, Disp: 60 tablet, Rfl: 3    ranitidine (ZANTAC) 150 mg tablet, TAKE 1 TABLET BY MOUTH DAILY @ 8AM AND @ 8PM, Disp: 60 tablet, Rfl: 5    Vitamin D, Cholecalciferol, 400 units TABS, Take 2 tablets (800 Units total) by mouth every morning Take 2 tab daily at 8AM, Disp: 60 tablet, Rfl: 3     Surgical History   No past surgical history on file.                 Allergies   Allergen Reactions    Erythromycin      Lactose Intolerance (Gi)      Nabumetone      Penicillins                 Patient Active Problem List   Diagnosis    Atherosclerosis of arteries of extremities (HCC)    Callus    Deformity of ankle and foot, acquired    Hammer toes of both feet    Pain in both feet    Systemic lupus erythematosus (HCC)         Review of Systems   Constitutional: Negative.    HENT: Negative.    Eyes: Negative.    Respiratory: Negative.    Cardiovascular: Negative.    Gastrointestinal: Negative.    Endocrine:  Negative.    Genitourinary: Negative.    Musculoskeletal: Positive for arthralgias.   Skin: Negative.    Allergic/Immunologic: Negative.    Neurological: Negative.    Hematological: Negative.    Psychiatric/Behavioral: Negative.           Objective:  Patient's shoes and socks were removed, feet examined.            Foot Exam     Q, 9 findings bilateral.  Abnormal cooling noted bilateral.  Negative digital hair of digits     General  General Appearance: appears stated age and healthy         Right Foot/Ankle      Inspection and Palpation  Arch: pes planus  Hammertoes: second toe, fifth toe, fourth toe and third toe  Hallux valgus: yes  Skin Exam: callus, dry skin and skin changes;      Neurovascular  Dorsalis pedis: 1+  Posterior tibial: 1+  Achilles reflex: 1+  Babinski reflex: 1+        Left Foot/Ankle       Inspection and Palpation  Arch: pes planus  Hammertoes: second toe, fifth toe, fourth toe and third toe  Hallux valgus: yes  Skin Exam: callus, dry skin and skin changes;      Neurovascular  Dorsalis pedis: 1+  Posterior tibial: 1+  Achilles reflex: 1+  Babinski reflex: 1+              Right Foot/Ankle   Right Foot Inspection  Skin Exam: dry skin, callus and callus                                 Vascular     The right DP pulse is 1+. The right PT pulse is 1+.   Right Toe  - Comprehensive Exam  Arch: pes planus  Hammertoes: second toe, fifth toe, fourth toe and third toe  Hallux valgus: yes     Left Foot/Ankle  Left Foot Inspection  Skin Exam: dry skin and callus                                             Vascular     The left DP pulse is 1+. The left PT pulse is 1+.   Left Toe  - Comprehensive Exam  Arch: pes planus  Hammertoes: second toe, fifth toe, fourth toe and third toe  Hallux valgus: yes          Physical Exam   Cardiovascular:   Pulses:       Dorsalis pedis pulses are 1+ on the right side, and 1+ on the left side.        Posterior tibial pulses are 1+ on the right side, and 1+ on the left side.    Feet:   Right Foot:   Skin Integrity: Positive for callus and dry skin.   Left Foot:   Skin Integrity: Positive for callus and dry skin.   Neurological:   Reflex Scores:       Achilles reflexes are 1+ on the right side and 1+ on the left side.         Rigidly contracted hammertoes 2 through 5 bilateral.  Painful hyperkeratotic lesion distal right 3rd digit   Painful hyperkeratotic lesion the distal medial aspect of the left hallux.   No open wound or signs of infection  Skin hairless and atrophic vascular changes noted bilateral  Significant bunion hammertoes bilateral     Plus one edema bilateral feet and ankles  Hyperpigmentation lower legs bilateral  Venous stasis bilateral

## 2025-01-03 NOTE — PROGRESS NOTES
Diagnostic Hearing Evaluation    Name:  Talia Jauregui  :  1950  Age:  74 y.o.   MRN:  [id number]  Date of Evaluation: 2024    HISTORY:     Reason for visit:  Annual hearing evaluation     Talia Jauregui is being seen today at the request of Dr. Kline for an annual evaluation of hearing. The patient reports no current, new medical issues from previous visit. The patient  denies otalgia, otorrhea, and tinnitus.     EVALUATION:    Otoscopic Evaluation:   Right Ear:  moderate cerumen removed via curette w/o incident    Left Ear:  moderate cerumen removed via curette w/o incident     Tympanometry:   Right Ear: Type A; normal middle ear pressure and static compliance    Left Ear: Type A; normal middle ear pressure and static compliance     Speech Audiometry:  Speech Reception (SRT)    Right Ear: 20 dB HL    Left Ear: 20 dB HL    Word Recognition Scores (WRS):  Right Ear: good (92 % correct)     Left Ear: good (96 % correct)    Stimuli: [id number]    Pure Tone Audiometry:  Conventional pure tone audiometry from 250 - 8000 Hz  was obtained with good reliability and revealed the following:     Right Ear: Mild sloping to moderately severe sensorineural hearing loss (SNHL)    Left Ear: Mild sloping to moderately severe sensorineural hearing loss (SNHL)     *see attached audiogram    IMPRESSIONS:   Thresholds are 5-10 dB HL from previous testing.   Word recognition scores are stable.      RECOMMENDATIONS:  Annual hearing evaluations for monitoring purposes     PATIENT EDUCATION:   The results of today's results and recommendations were reviewed with the patient and her hearing thresholds were explained at length. Treatment options, including amplification and communication strategies, were discussed as appropriate. The patient voiced understanding of her test results. Questions were addressed and the patient was encouraged to contact our department should concerns arise.    Serena Swann, [facility]  Clinical  Audiologist  [id number]  [facility] AUDIOLOGY  [address] [place] [place] [address]

## 2025-02-19 ENCOUNTER — APPOINTMENT (EMERGENCY)
Dept: RADIOLOGY | Facility: HOSPITAL | Age: 75
End: 2025-02-19
Payer: MEDICARE

## 2025-02-19 ENCOUNTER — HOSPITAL ENCOUNTER (EMERGENCY)
Facility: HOSPITAL | Age: 75
Discharge: HOME | End: 2025-02-19
Payer: MEDICARE

## 2025-02-19 VITALS
DIASTOLIC BLOOD PRESSURE: 76 MMHG | RESPIRATION RATE: 18 BRPM | OXYGEN SATURATION: 95 % | SYSTOLIC BLOOD PRESSURE: 137 MMHG | HEART RATE: 84 BPM | TEMPERATURE: 98 F

## 2025-02-19 DIAGNOSIS — S09.90XA CLOSED HEAD INJURY, INITIAL ENCOUNTER: ICD-10-CM
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
## 2025-02-19 PROCEDURE — 99284 EMERGENCY DEPT VISIT MOD MDM: CPT

## 2025-02-19 PROCEDURE — 70450 CT HEAD/BRAIN W/O DYE: CPT

## 2025-02-19 PROCEDURE — 72125 CT NECK SPINE W/O DYE: CPT

## 2025-02-19 NOTE — DISCHARGE INSTRUCTIONS
CT scans of your head and neck are normal.    If you develop new or worsening symptoms, please return to the Emergency Department for further evaluation.

## 2025-02-19 NOTE — ED PROVIDER NOTES
Time reflects when diagnosis was documented in both MDM as applicable and the Disposition within this note       Time User Action Codes Description Comment    2/19/2025  5:39 PM John Taylor Add [W19.XXXA] Fall, initial encounter     2/19/2025  5:39 PM John Taylor Add [S09.90XA] Closed head injury, initial encounter           ED Disposition       ED Disposition   Discharge    Condition   Stable    Date/Time   Wed Feb 19, 2025  5:39 PM    Comment   Talia Jauregui discharge to home/self care.                   Assessment & Plan       Medical Decision Making  Amount and/or Complexity of Data Reviewed  Radiology: ordered.    73 y/o F presents from [facility] with mechanical trip/fall with +HS. No thinners. No LOC. Pt fell to ground on hands but denies pain anywhere. Pt has small hematoma to R frontal scalp, no laceration  No neuro deficits on exam  CT head and C spine without acute findings  Pt stable for discharge back to [facility].         Medications - No data to display    ED Risk Strat Scores                                                History of Present Illness       Chief Complaint   Patient presents with    Fall     Pt arrives via EMS from the [facility]. Pt tripped and fell, hitting right eyebrow against wall. Upon arrival swelling noted to the site. Per EMS, no LOC and no blood thinners. Pt A&Ox4.       Past Medical History:   Diagnosis Date    Diabetes mellitus (HCC)     pre DM    Dry eye     related to seasonal allergies    GERD (gastroesophageal reflux disease)     Lupus     Seasonal allergies     Seizures (HCC)     last seizure 2015      Past Surgical History:   Procedure Laterality Date    COLONOSCOPY      JOINT REPLACEMENT Bilateral     knee      Family History   Problem Relation Age of Onset    Hypertension Mother     Diabetes Father     Cancer Brother       Social History     Tobacco Use    Smoking status: Never    Smokeless tobacco: Never   Substance Use Topics    Alcohol use: Never    Drug use: Never       E-Cigarette/Vaping      E-Cigarette/Vaping Substances      I have reviewed and agree with the history as documented.     HPI  See mdm  Review of Systems   Constitutional:  Negative for chills and fever.   HENT:  Negative for ear pain and sore throat.    Eyes:  Negative for pain and visual disturbance.   Respiratory:  Negative for cough and shortness of breath.    Cardiovascular:  Negative for chest pain and palpitations.   Gastrointestinal:  Negative for abdominal pain and vomiting.   Genitourinary:  Negative for dysuria and hematuria.   Musculoskeletal:  Negative for arthralgias and back pain.   Skin:  Negative for color change and rash.   Neurological:  Negative for seizures and syncope.   All other systems reviewed and are negative.          Objective       ED Triage Vitals [02/19/25 1624]   Temperature Pulse Blood Pressure Respirations SpO2 Patient Position - Orthostatic VS   98 °F (36.7 °C) 84 137/76 18 95 % Lying      Temp Source Heart Rate Source BP Location FiO2 (%) Pain Score    Oral Monitor Right arm -- --      Vitals      Date and Time Temp Pulse SpO2 Resp BP Pain Score FACES Pain Rating User   02/19/25 1624 98 °F (36.7 °C) 84 95 % 18 137/76 -- -- CS            Physical Exam  Vitals and nursing note reviewed.   Constitutional:       General: She is not in acute distress.  HENT:      Head: Normocephalic.      Comments: R frontal scalp with small hematoma     Right Ear: External ear normal.      Left Ear: External ear normal.      Nose: Nose normal.      Mouth/Throat:      Pharynx: Oropharynx is clear.   Eyes:      Extraocular Movements: Extraocular movements intact.      Pupils: Pupils are equal, round, and reactive to light.   Cardiovascular:      Rate and Rhythm: Normal rate and regular rhythm.      Pulses: Normal pulses.      Heart sounds: Normal heart sounds. No murmur heard.     No friction rub. No gallop.   Pulmonary:      Effort: Pulmonary effort is normal. No respiratory distress.      Breath  sounds: Normal breath sounds. No wheezing, rhonchi or rales.   Abdominal:      General: Abdomen is flat. There is no distension.      Palpations: Abdomen is soft.      Tenderness: There is no abdominal tenderness. There is no guarding or rebound.   Musculoskeletal:         General: No deformity. Normal range of motion.      Cervical back: Normal range of motion. No tenderness.      Right lower leg: No edema.      Left lower leg: No edema.   Skin:     General: Skin is warm and dry.      Capillary Refill: Capillary refill takes less than 2 seconds.      Findings: No rash.   Neurological:      General: No focal deficit present.      Mental Status: She is alert and oriented to person, place, and time. Mental status is at baseline.   Psychiatric:         Mood and Affect: Mood normal.         Results Reviewed       None            CT head without contrast   Final Interpretation by Rene Christina MD (02/19 1722)      Right frontal scalp contusion. No acute intracranial hemorrhage, significant mass effect or midline shift.      The study was marked in EPIC for immediate notification.               Workstation performed: [id number]         CT spine cervical without contrast   Final Interpretation by Rene Christina MD (02/19 1732)      No acute cervical spine fracture or traumatic malalignment.                  Workstation performed: [id number]             Procedures    ED Medication and Procedure Management   Prior to Admission Medications   Prescriptions Last Dose Informant Patient Reported? Taking?   Gentle Laxative 5 MG EC tablet   No No   Sig: TAKE 2 TABLETS (10 MG TOTAL) BY MOUTH ONCE FOR 1 DOSE PER OFFICE INSTRUCTIONS   Multiple Vitamin (TAB-A-ANNELIESE) TABS  Care Giver No No   Sig: TAKE 1 TABLET BY MOUTH DAILY @ 8AM .DR. ETIENNE   Multiple Vitamins-Minerals (MULTIVITAL PO)  Care Giver Yes No   Sig: Take by mouth     PATADAY 0.2 % opth drops  Care Giver Yes No   PHENobarbital 64.8 mg tablet  Care Giver Yes No   Sig:  Take 64.8 mg by mouth 2 (two) times a day   Pyridoxine HCl (VITAMIN B-6) 50 MG TABS  Care Giver Yes No   Sig: Take by mouth   Vitamin D, Cholecalciferol, 400 units TABS  Care Giver No No   Sig: Take 2 tablets (800 Units total) by mouth every morning Take 2 tab daily at 8AM   calcium carbonate (OS-YESICA) 600 MG tablet  Care Giver No No   Sig: TAKE ONE TABLET BY MOUTH @ 8AM AND @ 8PM   clindamycin (CLEOCIN) 300 MG capsule  Care Giver Yes No   Sig: daily as needed Prior to dental work s/p knee replcaement    famotidine (PEPCID) 20 mg tablet  Care Giver Yes No   Si mg 2 (two) times a day     folic acid (FOLVITE) 1 mg tablet  Care Giver Yes No   Sig: Take by mouth daily   furosemide (LASIX) 20 mg tablet  Care Giver No No   Sig: TAKE 1 TABLET BY MOUTH DAILY @ 8AM .DR. SAMANIEGO   hydroxychloroquine (PLAQUENIL) 200 mg tablet  Care Giver Yes No   Sig: Take 200 mg by mouth 2 (two) times a day with meals   lactase (LACTAID) 3,000 units tablet  Care Giver No No   Sig: Take 2 tablets (6,000 Units total) by mouth see administration instructions Take 2 tabs with first bite of dairy as needed.   levETIRAcetam (KEPPRA) 500 mg tablet  Care Giver Yes No   Si mg every 12 (twelve) hours     loratadine (CLARITIN) 10 mg tablet  Care Giver No No   Sig: TAKE 1 TABLET BY MOUTH @ 8PM .DR. CHANEY   metFORMIN (FORTAMET) 500 MG (OSM) 24 hr tablet  Care Giver Yes No   Si mg 2 (two) times a day with meals     polyethylene glycol (GLYCOLAX) 17 GM/SCOOP powder   No No   Sig: TAKE 238 GRAMS BY MOUTH ONCE FOR 1 DOSE FOR BOWEL PREP      Facility-Administered Medications: None     Discharge Medication List as of 2025  5:39 PM        CONTINUE these medications which have NOT CHANGED    Details   calcium carbonate (OS-YESICA) 600 MG tablet TAKE ONE TABLET BY MOUTH @ 8AM AND @ 8PM, Normal      clindamycin (CLEOCIN) 300 MG capsule daily as needed Prior to dental work s/p knee replcaement , Starting Fri 10/1/2021, Historical Med       famotidine (PEPCID) 20 mg tablet 20 mg 2 (two) times a day  , Starting Fri 5/29/2020, Historical Med      folic acid (FOLVITE) 1 mg tablet Take by mouth daily, Historical Med      furosemide (LASIX) 20 mg tablet TAKE 1 TABLET BY MOUTH DAILY @ 8AM .DR. SAMANIEGO, Normal      Gentle Laxative 5 MG EC tablet TAKE 2 TABLETS (10 MG TOTAL) BY MOUTH ONCE FOR 1 DOSE PER OFFICE INSTRUCTIONS, Normal      hydroxychloroquine (PLAQUENIL) 200 mg tablet Take 200 mg by mouth 2 (two) times a day with meals, Historical Med      lactase (LACTAID) 3,000 units tablet Take 2 tablets (6,000 Units total) by mouth see administration instructions Take 2 tabs with first bite of dairy as needed., Starting Tue 3/27/2018, Normal      levETIRAcetam (KEPPRA) 500 mg tablet 500 mg every 12 (twelve) hours  , Starting Tue 3/6/2018, Historical Med      loratadine (CLARITIN) 10 mg tablet TAKE 1 TABLET BY MOUTH @ 8PM .DR. CHANEY, Normal      metFORMIN (FORTAMET) 500 MG (OSM) 24 hr tablet 500 mg 2 (two) times a day with meals  , Starting Fri 10/8/2021, Historical Med      Multiple Vitamin (TAB-A-ANNELIESE) TABS TAKE 1 TABLET BY MOUTH DAILY @ 8AM .DR. ETIENNE, Normal      Multiple Vitamins-Minerals (MULTIVITAL PO) Take by mouth  , Historical Med      PATADAY 0.2 % opth drops Starting Tue 1/30/2018, Historical Med      PHENobarbital 64.8 mg tablet Take 64.8 mg by mouth 2 (two) times a day, Historical Med      polyethylene glycol (GLYCOLAX) 17 GM/SCOOP powder TAKE 238 GRAMS BY MOUTH ONCE FOR 1 DOSE FOR BOWEL PREP, Normal      Pyridoxine HCl (VITAMIN B-6) 50 MG TABS Take by mouth, Starting Wed 6/24/2009, Historical Med      Vitamin D, Cholecalciferol, 400 units TABS Take 2 tablets (800 Units total) by mouth every morning Take 2 tab daily at 8AM, Starting Tue 3/27/2018, Normal           No discharge procedures on file.  ED SEPSIS DOCUMENTATION   Time reflects when diagnosis was documented in both MDM as applicable and the Disposition within this note       Time User  Action Codes Description Comment    2/19/2025  5:39 PM John Taylor [W19.XXXA] Fall, initial encounter     2/19/2025  5:39 PM John Taylor [S09.90XA] Closed head injury, initial encounter                  John Taylor MD  02/19/25 9986

## 2025-02-26 ENCOUNTER — OFFICE VISIT (OUTPATIENT)
Dept: AUDIOLOGY | Facility: CLINIC | Age: 75
End: 2025-02-26
Payer: MEDICARE

## 2025-02-26 DIAGNOSIS — H90.3 SENSORY HEARING LOSS, BILATERAL: Primary | ICD-10-CM

## 2025-02-26 PROCEDURE — V5261 HEARING AID, DIGIT, BIN, BTE: HCPCS | Performed by: AUDIOLOGIST

## 2025-02-26 PROCEDURE — V5160 DISPENSING FEE BINAURAL: HCPCS | Performed by: AUDIOLOGIST

## 2025-02-27 NOTE — PROGRESS NOTES
"Hearing Aid Fitting    Name:  Talia Jauregui  :  1950  Age:  74 y.o.  MRN:  [id number]  Date of Evaluation: 2025    HISTORY:    Talia Jauregui was seen today for a binaural hearing aid fitting of her Oticon Zircon 1   in the canal (BHAVANI) R hearing aid(s). Talia was accompanied by her  caregiver  Naty,  to today's visit.. Hearing aid purchase is being paid by insurance benefit  [facility]    DEVICE INFORMATION:     Left Device Right Device   Hearing Aid Make: Oticon  Oticon    Hearing Aid Model: Zircon 1 mini R  Zircon 1 mini R    Serial Number: [id number]   Repair Warranty Date: 2028   Loss/Damage Warranty Status: Active  Active        Length/Output # 2 85 w/lock  # 2 85 w/lock    Wax System: Pro Wax miniFIT Pro Wax miniFIT   Dome Size/Style: 6 mm DB  6 mm DB    Battery: Lithium-ion Rechargeable Lithium-ion Rechargeable       Serial Number:  Stroho :  [id number]    Warranty Date:  2028     Accessories: N/A       DEVICE SETTINGS:    Hearing aid(s) were programmed using NAL 2 fitting formula and were adjusted based on the patient's previous fitting prescription and her perceived comfort level. Hearing aid(s) were set to  prescription  per patient's subjective listening preference. The patient noted good sound quality, and was happy with the overall sound quality and fit of the hearing aid(s).  She reported that these aids felt \"softer\" on her ears and she was able to converse well with me and her caregiver.      DEVICE ORIENTATION:    The patient and caregiver were counseled on device and  components and function. Proper insertion and removal of the aid(s) was demonstrated. The patient practiced insertion and removal of the devices in the office, they demonstrated excellent ability to manipulate the hearing aids. The patient was given the devices user manuals that reviews aid usage and operation.   Talia is a long time user of devices / her caregivers " provide assistance with cleaning the aids and changing supplies when needed.        The hearing aid warranty, including unlimited repair and a one time loss and damage per hearing aid, through the , as well as [facility]'s hearing aid service plan, including unlimited office visits, and supplies were outlined thoroughly. The patient agreed to the terms of sale listed on the purchase agreement containing device specifications, warranties, pricing information, as well as [facility]'s 45-day trial period timeline. After this period has elapsed, hearing aids cannot be returned.    DEVICE EXPECTATIONS & USAGE:     Hearing aids are assistive devices and are not designed to restore normal hearing sensitivity. The importance of realistic expectations, especially in the presence of background noise, was emphasized. The need for daily, consistent usage (8-12 hours per day) for proper device adjustment, as well as the importance of self-advocacy and practicing effective communication strategies was outlined.     Effective communication strategies include:  1.) Maintaining face-to-face communication, allowing for speechreading of facial expressions, lips, and gestures.  2.) Reducing background noise and distance between communication partners.  3.) Having communication partners reduce their rate of speech when appropriate.  4.) Beginning conversation by getting communication partner's attention.  5.) Asking for rephrasing of missed aspects of conversation rather than asking for repetition.    RECOMMENDATIONS:  The patient demonstrated understanding of all the topics discussed. The patientis to follow-up in 2-3 weeks for a hearing aid check within the trial period as scheduled.      RTO scheduled for 3/18/2025    Serena Swann, CCC-A  Clinical Audiologist   [id number]  [facility] AUDIOLOGY  [address] [facility] [address]

## 2025-03-13 ENCOUNTER — OFFICE VISIT (OUTPATIENT)
Age: 75
End: 2025-03-13
Payer: MEDICARE

## 2025-03-13 VITALS — RESPIRATION RATE: 18 BRPM | HEIGHT: 63 IN | BODY MASS INDEX: 28.88 KG/M2 | HEART RATE: 84 BPM | WEIGHT: 163 LBS

## 2025-03-13 DIAGNOSIS — B35.1 ONYCHOMYCOSIS: ICD-10-CM

## 2025-03-13 DIAGNOSIS — L84 CALLUS: ICD-10-CM

## 2025-03-13 DIAGNOSIS — M79.671 PAIN IN BOTH FEET: ICD-10-CM

## 2025-03-13 DIAGNOSIS — M79.672 PAIN IN BOTH FEET: ICD-10-CM

## 2025-03-13 DIAGNOSIS — I70.209 ATHEROSCLEROSIS OF ARTERIES OF EXTREMITIES (HCC): Primary | ICD-10-CM

## 2025-03-13 PROCEDURE — 11721 DEBRIDE NAIL 6 OR MORE: CPT | Performed by: PODIATRIST

## 2025-03-13 PROCEDURE — 11056 PARNG/CUTG B9 HYPRKR LES 2-4: CPT | Performed by: PODIATRIST

## 2025-03-13 PROCEDURE — RECHECK: Performed by: PODIATRIST

## 2025-03-13 NOTE — PROGRESS NOTES
Assessment./Plan:     Chart reviewed.  PCP notes reviewed.     Aseptic debridement of pre-trophic hyperkeratotic lesions ×4 plantar feet bilateral. Debridement with #10 scalpel debrided nonviable tissue down to healthy viable tissue.     Aseptic debridement and planning of nails x10 and manually and mechanically.  All procedures performed without pain or complication.     Daily foot checks monitor for signs of infection  Follow-up 3 months     Patien and t staff given aftercare instruction..  Patient will consider hammertoe surgery to correct deformity/toe position causing corn formation.            Diagnoses and all orders for this visit:     Atherosclerosis of arteries of extremities (HCC)     Pain in both feet     Callus     Deformity of both feet            Subjective:       Patient ID: Talia Jauregui is a 74 y.o. female.     Patient has painful hyperkeratotic lesions that hurt when walking wearing shoes.  Patient has not noticed any redness or signs of infection.        Medical History   No past medical history on file.            Current Outpatient Medications:     alendronate (FOSAMAX) 70 mg tablet, Take 70 mg by mouth every 7 days, Disp: , Rfl:     CALCITONIN, SALMON, NA, into each nostril, Disp: , Rfl:     calcium carbonate (OS-YESICA) 600 MG tablet, Take by mouth, Disp: , Rfl:     calcium carbonate (OS-YESICA) 600 MG tablet, Take 1 tablet (600 mg total) by mouth 2 (two) times a day with meals, Disp: 180 tablet, Rfl: 3    ferrous fumarate-b12-vitamic C-folic acid (FEROCON) capsule, Take by mouth, Disp: , Rfl:     folic acid (FOLVITE) 1 mg tablet, Take by mouth daily, Disp: , Rfl:     furosemide (LASIX) 20 mg tablet, TAKE ONE TABLET @ 8AM .DR. SAMANIEGO, Disp: 30 tablet, Rfl: 3    furosemide (LASIX) 20 mg tablet, TAKE 1 TABLET BY MOUTH DAILY @ 8AM .DR. SAMANIEGO, Disp: 30 tablet, Rfl: 5    hydroxychloroquine (PLAQUENIL) 200 mg tablet, Take 200 mg by mouth 2 (two) times a day with meals, Disp: , Rfl:     lactase  (LACTAID) 3,000 units tablet, Take 2 tablets (6,000 Units total) by mouth see administration instructions Take 2 tabs with first bite of dairy as needed., Disp: 60 tablet, Rfl: 3    levETIRAcetam (KEPPRA) 500 mg tablet, , Disp: , Rfl:     loratadine (CLARITIN) 10 mg tablet, TAKE 1 TABLET BY MOUTH @ 8PM .DR. CHANEY, Disp: 30 tablet, Rfl: 5    Multiple Vitamin (TAB-A-ANNELIESE) TABS, Take 1 tablet by mouth daily Take at 8 AM, Disp: 90 tablet, Rfl: 3    Multiple Vitamin (TAB-A-ANNELIESE) TABS, TAKE 1 TABLET BY MOUTH DAILY @ 8AM .DR. ETIENNE, Disp: 30 tablet, Rfl: 5    Multiple Vitamins-Minerals (MULTIVITAL PO), Take by mouth, Disp: , Rfl:     PATADAY 0.2 % opth drops, , Disp: , Rfl:     PHENobarbital 64.8 mg tablet, Take 64.8 mg by mouth 2 (two) times a day, Disp: , Rfl:     pyridoxine (VITAMIN B6) 50 mg tablet, Take 50 mg by mouth daily, Disp: , Rfl:     Pyridoxine HCl (VITAMIN B-6) 50 MG TABS, Take by mouth, Disp: , Rfl:     ranitidine (ZANTAC) 150 mg tablet, Take 1 tablet (150 mg total) by mouth 2 (two) times a day Take at 8 AM and 8 PM, Disp: 60 tablet, Rfl: 3    ranitidine (ZANTAC) 150 mg tablet, TAKE 1 TABLET BY MOUTH DAILY @ 8AM AND @ 8PM, Disp: 60 tablet, Rfl: 5    Vitamin D, Cholecalciferol, 400 units TABS, Take 2 tablets (800 Units total) by mouth every morning Take 2 tab daily at 8AM, Disp: 60 tablet, Rfl: 3     Surgical History   No past surgical history on file.                 Allergies   Allergen Reactions    Erythromycin      Lactose Intolerance (Gi)      Nabumetone      Penicillins                 Patient Active Problem List   Diagnosis    Atherosclerosis of arteries of extremities (HCC)    Callus    Deformity of ankle and foot, acquired    Hammer toes of both feet    Pain in both feet    Systemic lupus erythematosus (HCC)         Review of Systems   Constitutional: Negative.    HENT: Negative.    Eyes: Negative.    Respiratory: Negative.    Cardiovascular: Negative.    Gastrointestinal: Negative.    Endocrine:  Negative.    Genitourinary: Negative.    Musculoskeletal: Positive for arthralgias.   Skin: Negative.    Allergic/Immunologic: Negative.    Neurological: Negative.    Hematological: Negative.    Psychiatric/Behavioral: Negative.           Objective:  Patient's shoes and socks were removed, feet examined.            Foot Exam     Q, 9 findings bilateral.  Abnormal cooling noted bilateral.  Negative digital hair of digits     General  General Appearance: appears stated age and healthy         Right Foot/Ankle      Inspection and Palpation  Arch: pes planus  Hammertoes: second toe, fifth toe, fourth toe and third toe  Hallux valgus: yes  Skin Exam: callus, dry skin and skin changes;      Neurovascular  Dorsalis pedis: 1+  Posterior tibial: 1+  Achilles reflex: 1+  Babinski reflex: 1+        Left Foot/Ankle       Inspection and Palpation  Arch: pes planus  Hammertoes: second toe, fifth toe, fourth toe and third toe  Hallux valgus: yes  Skin Exam: callus, dry skin and skin changes;      Neurovascular  Dorsalis pedis: 1+  Posterior tibial: 1+  Achilles reflex: 1+  Babinski reflex: 1+              Right Foot/Ankle   Right Foot Inspection  Skin Exam: dry skin, callus and callus                                 Vascular     The right DP pulse is 1+. The right PT pulse is 1+.   Right Toe  - Comprehensive Exam  Arch: pes planus  Hammertoes: second toe, fifth toe, fourth toe and third toe  Hallux valgus: yes     Left Foot/Ankle  Left Foot Inspection  Skin Exam: dry skin and callus                                             Vascular     The left DP pulse is 1+. The left PT pulse is 1+.   Left Toe  - Comprehensive Exam  Arch: pes planus  Hammertoes: second toe, fifth toe, fourth toe and third toe  Hallux valgus: yes          Physical Exam   Cardiovascular:   Pulses:       Dorsalis pedis pulses are 1+ on the right side, and 1+ on the left side.        Posterior tibial pulses are 1+ on the right side, and 1+ on the left side.    Feet:   Right Foot:   Skin Integrity: Positive for callus and dry skin.   Left Foot:   Skin Integrity: Positive for callus and dry skin.   Neurological:   Reflex Scores:       Achilles reflexes are 1+ on the right side and 1+ on the left side.         Rigidly contracted hammertoes 2 through 5 bilateral.  Painful hyperkeratotic lesion distal right 3rd digit   Painful hyperkeratotic lesion the distal medial aspect of the left hallux.   No open wound or signs of infection  Skin hairless and atrophic vascular changes noted bilateral  Significant bunion hammertoes bilateral     Plus one edema bilateral feet and ankles  Hyperpigmentation lower legs bilateral  Venous stasis bilateral

## 2025-03-18 ENCOUNTER — OFFICE VISIT (OUTPATIENT)
Dept: AUDIOLOGY | Facility: CLINIC | Age: 75
End: 2025-03-18
Payer: MEDICARE

## 2025-03-18 DIAGNOSIS — H90.3 SENSORY HEARING LOSS, BILATERAL: Primary | ICD-10-CM

## 2025-03-18 PROCEDURE — 92593 HB HEARING AID CHECK BOTH EARS: CPT | Performed by: AUDIOLOGIST

## 2025-03-18 NOTE — PROGRESS NOTES
"Hearing Aid Visit:    Name:  Talia Jauregui  :  1950  Age:  74 y.o.  MRN:  [id number]  Date of Evaluation: 25     HISTORY:    Talia Jauregui was seen today (3/18/2025) for a(n) in-warranty (under trial) hearing aid check of her bilateral hearing aids. Today, Talia was accompanied by her caregiver, Aniyah.  Talia reported no difficulties with the devices, but it was observed that she did not have the aids in completely.      Most recent Audiogram: 2024    DEVICE INFORMATION:      Left Device Right Device   Hearing Aid Make: Oticon  Oticon    Hearing Aid Model: Zircon 1 mini R  Zircon 1 mini R    Serial Number: [id number]   Repair Warranty Date: 2028   Loss/Damage Warranty Status: Active  Active        Length/Output # 2 85 w/lock  # 2 85 w/lock    Wax System: Pro Wax miniFIT Pro Wax miniFIT   Dome Size/Style: 6 mm DB  6 mm DB    Battery: Lithium-ion Rechargeable Lithium-ion Rechargeable       Serial Number: desk :  [id number]   Warranty Date: 2028    Accessories: N/A       ACTION/ADJUSTMENTS:  Checked data / she is using them an average of 6 hours.   When wearing them, she will use them 4-8 hours or 8/12 hours.   She removes them after 3:00 when she is finished with Program for the day.  We all discussed using them until bedtime, but she takes them out for \"a shower\" and doesn't put them back in.    Did not change the settings as she is at prescription  Re-instructed on placement and had her demonstrate both aids twice.   She is able to place them well.      RECOMMENDATIONS:   RTO 7/10/2025 for follow up to fit and CR check  Advised to call with any issues prior to that time.       Serena Swann, CCC-A  Clinical Audiologist  [id number]  [facility] AUDIOLOGY  [address] [facility] [id number]  "

## 2025-04-18 ENCOUNTER — APPOINTMENT (OUTPATIENT)
Dept: LAB | Facility: CLINIC | Age: 75
End: 2025-04-18
Attending: INTERNAL MEDICINE
Payer: MEDICARE

## 2025-04-18 ENCOUNTER — TRANSCRIBE ORDERS (OUTPATIENT)
Dept: ADMINISTRATIVE | Age: 75
End: 2025-04-18

## 2025-04-18 DIAGNOSIS — M85.88 OTHER SPECIFIED DISORDERS OF BONE DENSITY AND STRUCTURE, OTHER SITE: Primary | ICD-10-CM

## 2025-04-18 DIAGNOSIS — E11.9 DIABETES MELLITUS WITHOUT COMPLICATION (HCC): ICD-10-CM

## 2025-04-18 DIAGNOSIS — M85.88 OTHER SPECIFIED DISORDERS OF BONE DENSITY AND STRUCTURE, OTHER SITE: ICD-10-CM

## 2025-04-18 LAB
25(OH)D3 SERPL-MCNC: 41.8 NG/ML (ref 30–100)
ANION GAP SERPL CALCULATED.3IONS-SCNC: 8 MMOL/L (ref 4–13)
BUN SERPL-MCNC: 15 MG/DL (ref 5–25)
CALCIUM SERPL-MCNC: 9.3 MG/DL (ref 8.4–10.2)
CHLORIDE SERPL-SCNC: 98 MMOL/L (ref 96–108)
CO2 SERPL-SCNC: 32 MMOL/L (ref 21–32)
CREAT SERPL-MCNC: 0.85 MG/DL (ref 0.6–1.3)
EST. AVERAGE GLUCOSE BLD GHB EST-MCNC: 114 MG/DL
GFR SERPL CREATININE-BSD FRML MDRD: 67 ML/MIN/1.73SQ M
GLUCOSE P FAST SERPL-MCNC: 78 MG/DL (ref 65–99)
HBA1C MFR BLD: 5.6 %
POTASSIUM SERPL-SCNC: 4.7 MMOL/L (ref 3.5–5.3)
SODIUM SERPL-SCNC: 138 MMOL/L (ref 135–147)

## 2025-04-18 PROCEDURE — 80048 BASIC METABOLIC PNL TOTAL CA: CPT

## 2025-04-18 PROCEDURE — 82306 VITAMIN D 25 HYDROXY: CPT

## 2025-04-18 PROCEDURE — 83036 HEMOGLOBIN GLYCOSYLATED A1C: CPT

## 2025-04-18 PROCEDURE — 36415 COLL VENOUS BLD VENIPUNCTURE: CPT

## 2025-05-22 ENCOUNTER — TELEPHONE (OUTPATIENT)
Age: 75
End: 2025-05-22

## 2025-05-22 NOTE — TELEPHONE ENCOUNTER
Caller: Meme[facility]  Tyson    [facility]     #: [phone]    Escalation: Appointment Calling on behalf of patient Talia. She normally comes to her appts with other patients from where she lives but someone this appt she got scheduled separate. They are bringing other patients in on July 8th and they would like to know if she can also be scheduled that day with them. Please return call to Meme and let her know. Thank you

## 2025-06-05 ENCOUNTER — PROCEDURE VISIT (OUTPATIENT)
Age: 75
End: 2025-06-05
Payer: MEDICARE

## 2025-06-05 VITALS — WEIGHT: 163 LBS | BODY MASS INDEX: 28.88 KG/M2 | HEIGHT: 63 IN | RESPIRATION RATE: 18 BRPM

## 2025-06-05 DIAGNOSIS — B35.9 DERMATOPHYTOSIS: Primary | ICD-10-CM

## 2025-06-05 DIAGNOSIS — M79.672 PAIN IN BOTH FEET: ICD-10-CM

## 2025-06-05 DIAGNOSIS — M79.671 PAIN IN BOTH FEET: ICD-10-CM

## 2025-06-05 PROCEDURE — RECHECK: Performed by: PODIATRIST

## 2025-06-05 PROCEDURE — 11000 DBRDMT ECZ/INFECTED SKIN<10%: CPT | Performed by: PODIATRIST

## 2025-06-05 NOTE — PROGRESS NOTES
Assessment/Plan: Dermatophytosis of heel bilateral.  Pain upon ambulation.    Plan.  Chart reviewed.  PCP notes reviewed.  Patient examined.  She has dermatophytosis.  This was debrided today, bilateral heel.  Patient has as needed order for antifungal.  Staff advised on this.  Aftercare instruction given.  Return for follow-up.         Diagnoses and all orders for this visit:    Dermatophytosis    Pain in both feet          Subjective: Patient has pain in her heels.  She recently has been on antibiotics.  She has had a leg infection as well.  She presents for evaluation.    Allergies   Allergen Reactions    Lactose Intolerance (Gi) - Food Allergy GI Intolerance    Nabumetone Other (See Comments)     Unknown reaction    Penicillins Other (See Comments)     Unknown reacion    Erythromycin Other (See Comments)     Unknown reaction       Current Medications[1]    Problem List[2]       Patient ID: Talia Jauregui is a 74 y.o. female.    HPI    The following portions of the patient's history were reviewed and updated as appropriate:     family history includes Cancer in her brother; Diabetes in her father; Hypertension in her mother.      reports that she has never smoked. She has never used smokeless tobacco. She reports that she does not drink alcohol and does not use drugs.    Vitals:    06/05/25 1538   Resp: 18       Review of Systems      Objective:  Patient's shoes and socks removed.   Foot ExamPhysical Exam  Vitals and nursing note reviewed.     Cardiovascular:      Rate and Rhythm: Normal rate and regular rhythm.     Skin:     Capillary Refill: Capillary refill takes less than 2 seconds.      Comments: Bilateral heel demonstrates fissures consistent with dermatophytosis.  Negative cellulitis of foot.     Neurological:      Mental Status: She is alert.                          [1]   Current Outpatient Medications:     calcium carbonate (OS-YESICA) 600 MG tablet, TAKE ONE TABLET BY MOUTH @ 8AM AND @ 8PM, Disp: 180  tablet, Rfl: 3    clindamycin (CLEOCIN) 300 MG capsule, daily as needed Prior to dental work s/p knee replcaement, Disp: , Rfl:     famotidine (PEPCID) 20 mg tablet, 20 mg in the morning and 20 mg in the evening., Disp: , Rfl:     folic acid (FOLVITE) 1 mg tablet, Take by mouth in the morning., Disp: , Rfl:     furosemide (LASIX) 20 mg tablet, TAKE 1 TABLET BY MOUTH DAILY @ 8AM .DR. SAMANIEGO, Disp: 30 tablet, Rfl: 5    Gentle Laxative 5 MG EC tablet, TAKE 2 TABLETS (10 MG TOTAL) BY MOUTH ONCE FOR 1 DOSE PER OFFICE INSTRUCTIONS, Disp: 2 tablet, Rfl: 0    hydroxychloroquine (PLAQUENIL) 200 mg tablet, Take 200 mg by mouth in the morning and 200 mg in the evening. Take with meals., Disp: , Rfl:     lactase (LACTAID) 3,000 units tablet, Take 2 tablets (6,000 Units total) by mouth see administration instructions Take 2 tabs with first bite of dairy as needed., Disp: 60 tablet, Rfl: 3    levETIRAcetam (KEPPRA) 500 mg tablet, 500 mg every 12 (twelve) hours, Disp: , Rfl:     loratadine (CLARITIN) 10 mg tablet, TAKE 1 TABLET BY MOUTH @ 8PM .DR. CHANEY, Disp: 30 tablet, Rfl: 5    metFORMIN (FORTAMET) 500 MG (OSM) 24 hr tablet, 500 mg in the morning and 500 mg in the evening. Take with meals., Disp: , Rfl:     Multiple Vitamin (TAB-A-ANNELIESE) TABS, TAKE 1 TABLET BY MOUTH DAILY @ 8AM .DR. ETIENNE, Disp: 30 tablet, Rfl: 5    Multiple Vitamins-Minerals (MULTIVITAL PO), Take by mouth, Disp: , Rfl:     PATADAY 0.2 % opth drops, , Disp: , Rfl:     PHENobarbital 64.8 mg tablet, Take 64.8 mg by mouth in the morning and 64.8 mg in the evening., Disp: , Rfl:     polyethylene glycol (GLYCOLAX) 17 GM/SCOOP powder, TAKE 238 GRAMS BY MOUTH ONCE FOR 1 DOSE FOR BOWEL PREP, Disp: 238 g, Rfl: 8    Pyridoxine HCl (VITAMIN B-6) 50 MG TABS, Take by mouth, Disp: , Rfl:     Vitamin D, Cholecalciferol, 400 units TABS, Take 2 tablets (800 Units total) by mouth every morning Take 2 tab daily at 8AM, Disp: 60 tablet, Rfl: 3  [2]   Patient Active Problem  List  Diagnosis    Atherosclerosis of arteries of extremities (HCC)    Callus    Deformity of ankle and foot, acquired    Hammer toes of both feet    Pain in both feet    Systemic lupus erythematosus (HCC)

## 2025-06-06 ENCOUNTER — OFFICE VISIT (OUTPATIENT)
Dept: CARDIOLOGY CLINIC | Facility: CLINIC | Age: 75
End: 2025-06-06
Payer: MEDICARE

## 2025-06-06 VITALS
BODY MASS INDEX: 27.29 KG/M2 | WEIGHT: 154 LBS | HEIGHT: 63 IN | DIASTOLIC BLOOD PRESSURE: 64 MMHG | OXYGEN SATURATION: 94 % | HEART RATE: 75 BPM | SYSTOLIC BLOOD PRESSURE: 130 MMHG

## 2025-06-06 DIAGNOSIS — I70.209 ATHEROSCLEROSIS OF ARTERIES OF EXTREMITIES (HCC): Primary | ICD-10-CM

## 2025-06-06 DIAGNOSIS — R60.0 BILATERAL LEG EDEMA: ICD-10-CM

## 2025-06-06 DIAGNOSIS — R06.02 EXERTIONAL SHORTNESS OF BREATH: ICD-10-CM

## 2025-06-06 DIAGNOSIS — R00.2 PALPITATIONS: ICD-10-CM

## 2025-06-06 PROCEDURE — 99204 OFFICE O/P NEW MOD 45 MIN: CPT | Performed by: INTERNAL MEDICINE

## 2025-06-06 PROCEDURE — 93000 ELECTROCARDIOGRAM COMPLETE: CPT | Performed by: INTERNAL MEDICINE

## 2025-06-06 RX ORDER — ALBUTEROL SULFATE 90 UG/1
INHALANT RESPIRATORY (INHALATION)
COMMUNITY
Start: 2025-04-01

## 2025-06-06 NOTE — PROGRESS NOTES
[facility]  [address]   [place] [address]  Cardiology Consultation    Talia Jauregui  [id number]  1950      Consult for:Dyspnea  Appreciate consult by: Sanford Kline    1. Atherosclerosis of arteries of extremities (HCC)  POCT ECG    Zio Monitor    NM myocardial perfusion spect (rx stress and/or rest)    Echo complete w/ contrast if indicated      2. Exertional shortness of breath  Zio Monitor    NM myocardial perfusion spect (rx stress and/or rest)    Echo complete w/ contrast if indicated      3. Bilateral leg edema  Zio Monitor    NM myocardial perfusion spect (rx stress and/or rest)    Echo complete w/ contrast if indicated      4. Palpitations  Zio Monitor    NM myocardial perfusion spect (rx stress and/or rest)    Echo complete w/ contrast if indicated         Discussion/Summary:   Assessment  - Arrhythmia with premature ventricular contractions  - Shortness of breath on exertion, possibly related to cardiac issues  - Lupus with potential cardiac involvement  - Scoliosis    Recommendations  - Use of a 3-day heart monitor  - Updated echocardiogram and non-walking stress test  - Consideration of starting Jardiance after test results  - Implementation of a 2-gram sodium diet  - Follow-up appointment after testing results    HPI:   Talia reports difficulty catching her breath during physical activities, such as walking long distances. She experiences periodic early heartbeats and palpitations. Recently diagnosed with cellulitis in her right leg, she also deals with leg swelling. Talia has lupus and is currently managing her condition with medication. A heart monitor will be mailed to Talia for three days of use, and an updated echocardiogram and non-walking stress test are planned to assess her cardiac health further.    Past Medical History  - Lupus  - Previous evaluation with echocardiogram and stress test (8 years ago)  - Cellulitis in the  "right leg (recent)    Medications  - Prednisone (recently for viral infection)  - Plaquenil  - Lasix            Past Medical History[1]  Social History     Socioeconomic History    Marital status: Single     Spouse name: Not on file    Number of children: Not on file    Years of education: Not on file    Highest education level: Not on file   Occupational History    Not on file   Tobacco Use    Smoking status: Never    Smokeless tobacco: Never   Substance and Sexual Activity    Alcohol use: Never    Drug use: Never    Sexual activity: Not on file   Other Topics Concern    Not on file   Social History Narrative    Not on file     Social Drivers of Health     Financial Resource Strain: Not on file   Food Insecurity: Not on file   Transportation Needs: Not on file   Physical Activity: Not on file   Stress: Not on file   Social Connections: Not on file   Intimate Partner Violence: Not on file   Housing Stability: Not on file      Family History[2]  Past Surgical History[3]  Current Medications[4]  Allergies   Allergen Reactions    Lactose Intolerance (Gi) - Food Allergy GI Intolerance    Nabumetone Other (See Comments)     Unknown reaction    Penicillins Other (See Comments)     Unknown reacion    Erythromycin Other (See Comments)     Unknown reaction     Vitals:    06/06/25 1100   BP: 130/64   BP Location: Right arm   Patient Position: Sitting   Cuff Size: Standard   Pulse: 75   SpO2: 94%   Weight: 69.9 kg (154 lb)   Height: 5' 3\" (1.6 m)       Review of Systems:   Review of Systems   Constitutional:  Positive for fatigue. Negative for activity change, appetite change, chills, diaphoresis, fever and unexpected weight change.   HENT: Negative.  Negative for congestion, dental problem, drooling, ear discharge, ear pain, facial swelling, hearing loss, mouth sores, nosebleeds, postnasal drip, rhinorrhea, sinus pressure, sinus pain, sneezing, sore throat, tinnitus, trouble swallowing and voice change.    Eyes: Negative.  " "Negative for photophobia, pain, redness, itching and visual disturbance.   Respiratory:  Positive for shortness of breath. Negative for apnea, cough, choking, chest tightness, wheezing and stridor.    Cardiovascular: Negative.  Negative for chest pain, palpitations and leg swelling.   Gastrointestinal: Negative.  Negative for abdominal distention, abdominal pain, anal bleeding, blood in stool, constipation, diarrhea, nausea, rectal pain and vomiting.   Endocrine: Negative.  Negative for cold intolerance, heat intolerance, polydipsia, polyphagia and polyuria.   Genitourinary: Negative.  Negative for decreased urine volume, difficulty urinating, dyspareunia, dysuria, enuresis, flank pain, frequency, genital sores, hematuria, menstrual problem, pelvic pain, urgency, vaginal bleeding, vaginal discharge and vaginal pain.   Musculoskeletal:  Positive for arthralgias, back pain and gait problem. Negative for joint swelling, myalgias, neck pain and neck stiffness.   Skin: Negative.  Negative for color change, pallor, rash and wound.   Allergic/Immunologic: Negative.  Negative for environmental allergies, food allergies and immunocompromised state.   Neurological:  Negative for dizziness, tremors, seizures, syncope, facial asymmetry, speech difficulty, weakness, light-headedness, numbness and headaches.   Hematological: Negative.  Negative for adenopathy. Does not bruise/bleed easily.   Psychiatric/Behavioral: Negative.  Negative for agitation, behavioral problems, confusion, decreased concentration, dysphoric mood, hallucinations, self-injury, sleep disturbance and suicidal ideas. The patient is not nervous/anxious and is not hyperactive.    All other systems reviewed and are negative.      Vitals:    06/06/25 1100   BP: 130/64   BP Location: Right arm   Patient Position: Sitting   Cuff Size: Standard   Pulse: 75   SpO2: 94%   Weight: 69.9 kg (154 lb)   Height: 5' 3\" (1.6 m)     Physical Examination:   Physical " Exam  Constitutional:       General: She is not in acute distress.     Appearance: She is well-developed. She is ill-appearing. She is not diaphoretic.   HENT:      Head: Normocephalic and atraumatic.      Right Ear: External ear normal.      Left Ear: External ear normal.     Eyes:      General: No scleral icterus.        Right eye: No discharge.         Left eye: No discharge.      Conjunctiva/sclera: Conjunctivae normal.      Pupils: Pupils are equal, round, and reactive to light.     Neck:      Thyroid: No thyromegaly.      Vascular: No JVD.      Trachea: No tracheal deviation.     Cardiovascular:      Rate and Rhythm: Normal rate and regular rhythm.      Heart sounds: Murmur heard.      No friction rub. Gallop present.   Pulmonary:      Effort: Pulmonary effort is normal. No respiratory distress.      Breath sounds: Normal breath sounds. No stridor. No wheezing or rales.   Chest:      Chest wall: No tenderness.   Abdominal:      General: Bowel sounds are normal. There is no distension.      Palpations: Abdomen is soft. There is no mass.      Tenderness: There is no abdominal tenderness. There is no guarding or rebound.     Musculoskeletal:         General: Tenderness present. No deformity. Normal range of motion.      Cervical back: Normal range of motion and neck supple.     Skin:     General: Skin is warm and dry.      Coloration: Skin is not pale.      Findings: No erythema or rash.     Neurological:      Mental Status: She is alert and oriented to person, place, and time.      Cranial Nerves: No cranial nerve deficit.      Motor: No abnormal muscle tone.      Coordination: Coordination normal.      Deep Tendon Reflexes: Reflexes are normal and symmetric. Reflexes normal.     Psychiatric:         Behavior: Behavior normal.         Thought Content: Thought content normal.         Judgment: Judgment normal.         Labs:     Lab Results   Component Value Date    WBC 3.71 (L) 05/29/2024    HGB 12.1 05/29/2024  "   HCT 38.9 2024    MCV 95 2024    RDW 13.5 2024     2024     BMP:  Lab Results   Component Value Date    SODIUM 138 2025    K 4.7 2025    CL 98 2025    CO2 32 2025    ANIONGAP 9.3 (L) 2015    BUN 15 2025    CREATININE 0.85 2025    GLUC 73 2016    GLUF 78 2025    CALCIUM 9.3 2025    EGFR 67 2025     LFT:  Lab Results   Component Value Date    AST 40 (H) 2024    ALT 19 2024    ALKPHOS 63 2024    TP 7.2 2024    ALB 4.4 2024      Lab Results   Component Value Date    YVW2HDNCCRJG 3.359 2024     Lab Results   Component Value Date    HGBA1C 5.6 2025     Lipid Profile:   Lab Results   Component Value Date    CHOLESTEROL 227 (H) 2024     2024    LDLCALC 104 (H) 2024    TRIG 103 2024     Lab Results   Component Value Date    CHOLESTEROL 227 (H) 2024    CHOLESTEROL 229 (H) 2023     No results found for: \"CKTOTAL\", \"CKMB\", \"CKMBINDEX\", \"TROPONINI\"  No results found for: \"NTBNP\"   No results found for this or any previous visit (from the past 4 weeks).    Imaging & Testing   I have personally reviewed pertinent reports.      Cardiac Testing   Results for orders placed in visit on 17    Echo complete with contrast if indicated    Narrative  [address] [address] [phone]    Transthoracic Echocardiogram  2D, M-mode, Doppler, and Color Doppler    Study date:  20-Dec-2017    Patient: NURA NORRIS  MR number: [id number]  Account number: [id number]  : 1950  Age: 67 years  Gender: Female  Status: Routine  Location: Echo lab  Height: 63 in  Weight: 184.6 lb  BP: 128/ 80 mmHg    Indications: Chest Pain    Diagnoses: 786.50 - CHEST PAIN NOS    Sonographer:  MARY Barker  Primary Physician:  Holland Membreno MD  Referring Physician:  Cristobal Morgan MD  Group:  [facility] " Tyson  Interpreting Physician:  Cristobal Morgan MD    SUMMARY    LEFT VENTRICLE:  Systolic function was normal. Ejection fraction was estimated in the range of 60 % to 65 %.  There were no regional wall motion abnormalities.  Wall thickness was mildly increased.    LEFT ATRIUM:  The atrium was moderately dilated.    RIGHT ATRIUM:  The atrium was mildly dilated.    TRICUSPID VALVE:  There was trace regurgitation.    PULMONIC VALVE:  There was mild regurgitation.    HISTORY: PRIOR HISTORY: Atherosclerosis of Arteries, Scoliosis    PROCEDURE: The procedure was performed in the echo lab. This was a routine study. The transthoracic approach was used. The study included complete 2D imaging, M-mode, complete spectral Doppler, and color Doppler. The heart rate was 80 bpm,  at the start of the study. Images were not obtained from the subcostal acoustic windows. Image quality was adequate.    LEFT VENTRICLE: Size was normal. Systolic function was normal. Ejection fraction was estimated in the range of 60 % to 65 %. There were no regional wall motion abnormalities. Wall thickness was mildly increased. No evidence of apical  thrombus. DOPPLER: Left ventricular diastolic function parameters were normal.    RIGHT VENTRICLE: The size was normal. Systolic function was normal. Wall thickness was normal.    LEFT ATRIUM: The atrium was moderately dilated.    RIGHT ATRIUM: The atrium was mildly dilated.    MITRAL VALVE: Valve structure was normal. There was normal leaflet separation. DOPPLER: The transmitral velocity was within the normal range. There was no evidence for stenosis. There was no significant regurgitation.    AORTIC VALVE: The valve was trileaflet. Leaflets exhibited normal thickness and normal cuspal separation. DOPPLER: Transaortic velocity was within the normal range. There was no evidence for stenosis. There was no significant  regurgitation.    TRICUSPID VALVE: The valve structure was normal. There was normal leaflet  separation. DOPPLER: The transtricuspid velocity was within the normal range. There was no evidence for stenosis. There was trace regurgitation. Estimated peak PA  pressure was 26 mmHg.    PULMONIC VALVE: Leaflets exhibited normal thickness, no calcification, and normal cuspal separation. DOPPLER: The transpulmonic velocity was within the normal range. There was mild regurgitation.    PERICARDIUM: There was no pericardial effusion. The pericardium was normal in appearance.    AORTA: The root exhibited normal size.    SYSTEMIC VEINS: IVC: The inferior vena cava was normal in size.    SYSTEM MEASUREMENT TABLES    2D mode  AoR Diam 2D: 3.7 cm  LA Diam (2D): 4.6 cm  LA/Ao (2D): 1.24  FS (2D Teich): 32.9 %  IVSd (2D): 1.13 cm  LVDEV: 88.2 cmÃ¯Â¾Â³  LVESV: 33.9 cmÃ¯Â¾Â³  LVIDd(2D): 4.41 cm  LVISd (2D): 2.96 cm  LVPWd (2D): 1.12 cm  SV (Teich): 54.3 cmÃ¯Â¾Â³    Apical four chamber  LVEF A4C: 61 %    Unspecified Scan Mode  MV Peak A Derrick: 507 mm/s  MV Peak E Derrick. Mean: 395 mm/s  MVA (PHT): 4.15 cm squared  PHT: 53 ms  Max P mm[Hg]  V Max: 2420 mm/s  Vmax: 2500 mm/s  RA Area: 18.6 cm squared  RA Volume: 47.9 cmÃ¯Â¾Â³  TAPSE: 1.6 cm    [facility] Accredited Echocardiography Laboratory    Prepared and electronically signed by    Cristobal Morgan MD  Signed 20-Dec-2017 14:41:25    No results found for this or any previous visit.    No results found for this or any previous visit.    No results found for this or any previous visit.    Results for orders placed during the hospital encounter of 17    NM myocardial perfusion spect (stress and/or rest)    Narrative  [address] [address] [phone]    Rest/Stress Gated SPECT Myocardial Perfusion Imaging After Regadenoson    Patient: NURA NORRIS  MR number: [id number]  Account number: [id number]  : 1950  Age: 67 years  Gender: Female  Status: Outpatient  Location: Stress lab  Height: 63  in  Weight: 185 lb  BP: 113/ 65 mmHg    Allergies: ALLERGIES NOT ON FILE    Diagnosis: R07.2 - Precordial pain, R94.31 - Abnormal electrocardiogram [ECG] [EKG]    Primary Physician:  Holland Membreno MD  RN:  GORGE Mejias  Referring Physician:  Cristobal Morgan MD  Group:  CASSANDRA Mehta  Report Prepared By::  GORGE Mejias  Interpreting Physician:  Cristobal Morgan MD    INDICATIONS: assess for Coronary artery disease.    HISTORY: The patient is a 67 year old  female. Chest pain status: chest pain. Other symptoms: dyspnea. Coronary artery disease risk factors: none. Cardiovascular history: atherosclerosis of arteries of lower extremities.  Co-morbidity: systemic lupus erythematosus, age. Medications: a diuretic. Previous test results: abnormal ECG.    PHYSICAL EXAM: Baseline physical exam screening: no wheezes audible, uses a cane with ambulation.    REST ECG: Normal sinus rhythm. The ECG showed occasional premature atrial contractions.    PROCEDURE: The study was performed in the stress lab. A regadenoson infusion pharmacologic stress test was performed. Gated SPECT myocardial perfusion imaging was performed after stress and at rest. Systolic blood pressure was 113 mmHg, at  the start of the study. Diastolic blood pressure was 65 mmHg, at the start of the study. The heart rate was 95 bpm, at the start of the study. IV double checked.  Regadenoson protocol:  Time HR bpm SBP mmHg DBP mmHg Symptoms Rhythm/conduct  Baseline 09:37 72 113 65 none NSR, occasional PAC's  Immediate 09:44 102 100 61 none --  1 min 09:45 95 104 56 none --  2 min 09:46 93 101 55 none --  3 min 09:47 91 99 61 none --  4 min 09:48 91 102 63 none --  No medications or fluids given.    STRESS SUMMARY: Duration of pharmacologic stress was 3 min. Maximal heart rate during stress was 102 bpm. The heart rate response to stress was normal. There was normal resting blood pressure with an appropriate response to stress.  The  rate-pressure product for the peak heart rate and blood pressure was 91627. There was no chest pain during stress. The stress test was terminated due to protocol completion. Pre oxygen saturation: 96 %. Peak oxygen saturation: 100 %. The  stress ECG was negative for ischemia and normal. There were no stress arrhythmias or conduction abnormalities.    ISOTOPE ADMINISTRATION:  Resting isotope administration Stress isotope administration  Agent Tetrofosmin Tetrofosmin  Dose 11 mCi 33 mCi  Date 12/20/2017 12/20/2017    The radiopharmaceutical was injected at the peak effect of pharmacologic stress.    MYOCARDIAL PERFUSION IMAGING:  The image quality was good. Left ventricular size was normal. The TID ratio was 1.3. However, on visual inspection without evidence of significant stress dilation    PERFUSION DEFECTS:  -  There was a small, partially reversible myocardial perfusion defect of the anterior wall due to attenuation from breast tissue.    GATED SPECT:  The calculated left ventricular ejection fraction was 70 %. Left ventricular ejection fraction was within normal limits by visual estimate. There was no left ventricular regional abnormality.    SUMMARY:  -  Stress results: There was no chest pain during stress.  -  ECG conclusions: The stress ECG was negative for ischemia and normal.  -  Perfusion imaging: There was a small, partially reversible myocardial perfusion defect of the anterior wall due to attenuation from breast tissue.  -  Gated SPECT: The calculated left ventricular ejection fraction was 70 %. Left ventricular ejection fraction was within normal limits by visual estimate. There was no left ventricular regional abnormality.  -  Impressions and recommendations: Equivocal study after pharmacologic vasodilation.  -  Summary: No evidence of prior infarction or major high risk ischemia    IMPRESSIONS: Equivocal study after pharmacologic vasodilation.    Prepared and signed by    Cristobal Morgan  "MD  Signed 12/20/2017 16:43:17    No results found for this or any previous visit.      EKG: Personally reviewed.    Sinus rhythm with PAC incomplete right bundle branch block no acute ST changes    Cristobal Morgan MD [facility]RE[facility] Mehta  [phone]  Please call with any questions or suggestions    Counseling :  A description of the counseling:   Goals and Barriers:  Patient's ability to self care:  Medication side effect reviewed with patient in detail and all their questions answered.    \"Portions of the record may have been created with voice recognition software. Occasional wrong word or \"sound a like\" substitutions may have occurred due to the inherent limitations of voice recognition software. Read the chart carefully and recognize, using context, where substitutions have occurred. Please call if you have any questions. \"         [1]   Past Medical History:  Diagnosis Date    Diabetes mellitus (HCC)     pre DM    Dry eye     related to seasonal allergies    GERD (gastroesophageal reflux disease)     Lupus     Seasonal allergies     Seizures (HCC)     last seizure 2015   [2]   Family History  Problem Relation Name Age of Onset    Hypertension Mother      Diabetes Father      Cancer Brother     [3]   Past Surgical History:  Procedure Laterality Date    COLONOSCOPY      JOINT REPLACEMENT Bilateral     knee   [4]   Current Outpatient Medications:     albuterol (PROVENTIL HFA,VENTOLIN HFA) 90 mcg/act inhaler, , Disp: , Rfl:     calcium carbonate (OS-YESICA) 600 MG tablet, TAKE ONE TABLET BY MOUTH @ 8AM AND @ 8PM, Disp: 180 tablet, Rfl: 3    clindamycin (CLEOCIN) 300 MG capsule, daily as needed Prior to dental work s/p knee replcaement, Disp: , Rfl:     famotidine (PEPCID) 20 mg tablet, 20 mg in the morning and 20 mg in the evening., Disp: , Rfl:     folic acid (FOLVITE) 1 mg tablet, Take by mouth in the morning., Disp: , Rfl:     furosemide (LASIX) 20 mg tablet, TAKE 1 TABLET BY MOUTH DAILY @ 8AM .DR. SAMANIEGO, Disp: 30 " tablet, Rfl: 5    Gentle Laxative 5 MG EC tablet, TAKE 2 TABLETS (10 MG TOTAL) BY MOUTH ONCE FOR 1 DOSE PER OFFICE INSTRUCTIONS, Disp: 2 tablet, Rfl: 0    hydroxychloroquine (PLAQUENIL) 200 mg tablet, Take 200 mg by mouth in the morning and 200 mg in the evening. Take with meals., Disp: , Rfl:     lactase (LACTAID) 3,000 units tablet, Take 2 tablets (6,000 Units total) by mouth see administration instructions Take 2 tabs with first bite of dairy as needed., Disp: 60 tablet, Rfl: 3    levETIRAcetam (KEPPRA) 500 mg tablet, 500 mg every 12 (twelve) hours, Disp: , Rfl:     loratadine (CLARITIN) 10 mg tablet, TAKE 1 TABLET BY MOUTH @ 8PM .DR. CHANEY, Disp: 30 tablet, Rfl: 5    metFORMIN (FORTAMET) 500 MG (OSM) 24 hr tablet, 500 mg in the morning and 500 mg in the evening. Take with meals., Disp: , Rfl:     Multiple Vitamin (TAB-A-ANNELIESE) TABS, TAKE 1 TABLET BY MOUTH DAILY @ 8AM .DR. ETIENNE, Disp: 30 tablet, Rfl: 5    Multiple Vitamins-Minerals (MULTIVITAL PO), Take by mouth, Disp: , Rfl:     PATADAY 0.2 % opth drops, , Disp: , Rfl:     PHENobarbital 64.8 mg tablet, Take 64.8 mg by mouth in the morning and 64.8 mg in the evening., Disp: , Rfl:     polyethylene glycol (GLYCOLAX) 17 GM/SCOOP powder, TAKE 238 GRAMS BY MOUTH ONCE FOR 1 DOSE FOR BOWEL PREP, Disp: 238 g, Rfl: 8    Pyridoxine HCl (VITAMIN B-6) 50 MG TABS, Take by mouth, Disp: , Rfl:     Vitamin D, Cholecalciferol, 400 units TABS, Take 2 tablets (800 Units total) by mouth every morning Take 2 tab daily at 8AM, Disp: 60 tablet, Rfl: 3

## 2025-06-11 ENCOUNTER — TELEPHONE (OUTPATIENT)
Age: 75
End: 2025-06-11

## 2025-06-11 NOTE — TELEPHONE ENCOUNTER
Caller: Erlinda     Doctor: Dr. Morgan    Reason for call: Erlinda calling from [facility] requesting echo and monitor results from 6/6/2020 to be faxed to [phone]. State requires to have medical updates in place.    Call back#: [phone]

## 2025-06-27 LAB
CV ZIO BASELINE AVG BPM: 81 BPM
CV ZIO BASELINE BPM HIGH: 207 BPM
CV ZIO BASELINE BPM LOW: 57 BPM
CV ZIO DEVICE ANALYSIS TIME: NORMAL
CV ZIO ECT SVE COUNT: NORMAL EPISODES
CV ZIO ECT SVE CPLT COUNT: NORMAL EPISODES
CV ZIO ECT SVE CPLT FREQ: NORMAL
CV ZIO ECT SVE FREQ: NORMAL
CV ZIO ECT SVE TPLT COUNT: 2604 EPISODES
CV ZIO ECT SVE TPLT FREQ: NORMAL
CV ZIO ECT VE COUNT: 1879 EPISODES
CV ZIO ECT VE CPLT COUNT: 49 EPISODES
CV ZIO ECT VE CPLT FREQ: NORMAL
CV ZIO ECT VE FREQ: NORMAL
CV ZIO ECT VE TPLT COUNT: 0 EPISODES
CV ZIO ECT VE TPLT FREQ: 0
CV ZIO ECTOPIC SVE COUPLET RAW PERCENT: 7.84 %
CV ZIO ECTOPIC SVE ISOLATED PERCENT: 9.06 %
CV ZIO ECTOPIC SVE TRIPLET RAW PERCENT: 2.13 %
CV ZIO ECTOPIC VE COUPLET RAW PERCENT: 0.03 %
CV ZIO ECTOPIC VE ISOLATED PERCENT: 0.51 %
CV ZIO ECTOPIC VE TRIPLET RAW PERCENT: 0 %
CV ZIO ENROLLMENT END: NORMAL
CV ZIO ENROLLMENT START: NORMAL
CV ZIO PATIENT EVENTS DIARIES: 0
CV ZIO PATIENT EVENTS TRIGGERS: 0
CV ZIO PAUSE COUNT: 0
CV ZIO PRESCRIPTION STATUS: NORMAL
CV ZIO SVT AVG BPM: 127 BPM
CV ZIO SVT BPM HIGH: 207 BPM
CV ZIO SVT BPM LOW: 81 BPM
CV ZIO SVT COUNT: 613
CV ZIO SVT F EPI AVG BPM: 188 BPM
CV ZIO SVT F EPI BEATS: 4 BEATS
CV ZIO SVT F EPI BPM HIGH: 207 BPM
CV ZIO SVT F EPI BPM LOW: 167 BPM
CV ZIO SVT F EPI DUR: 1 SEC
CV ZIO SVT F EPI END: NORMAL
CV ZIO SVT F EPI START: NORMAL
CV ZIO SVT L EPI AVG BPM: 133 BPM
CV ZIO SVT L EPI BEATS: 59 BEATS
CV ZIO SVT L EPI BPM HIGH: 169 BPM
CV ZIO SVT L EPI BPM LOW: 118 BPM
CV ZIO SVT L EPI DUR: 26.2 SEC
CV ZIO SVT L EPI END: NORMAL
CV ZIO SVT L EPI START: NORMAL
CV ZIO TOTAL  ENROLLMENT PERIOD: NORMAL
CV ZIO VT COUNT: 0

## 2025-07-08 ENCOUNTER — OFFICE VISIT (OUTPATIENT)
Age: 75
End: 2025-07-08
Payer: MEDICARE

## 2025-07-08 VITALS — HEIGHT: 63 IN | WEIGHT: 154 LBS | RESPIRATION RATE: 16 BRPM | BODY MASS INDEX: 27.29 KG/M2

## 2025-07-08 DIAGNOSIS — B35.9 DERMATOPHYTOSIS: Primary | ICD-10-CM

## 2025-07-08 DIAGNOSIS — M79.671 PAIN IN BOTH FEET: ICD-10-CM

## 2025-07-08 DIAGNOSIS — I70.209 ATHEROSCLEROSIS OF ARTERIES OF EXTREMITIES (HCC): ICD-10-CM

## 2025-07-08 DIAGNOSIS — M79.672 PAIN IN BOTH FEET: ICD-10-CM

## 2025-07-08 DIAGNOSIS — B35.1 ONYCHOMYCOSIS: ICD-10-CM

## 2025-07-08 PROCEDURE — RECHECK: Performed by: PODIATRIST

## 2025-07-08 PROCEDURE — 11000 DBRDMT ECZ/INFECTED SKIN<10%: CPT | Performed by: PODIATRIST

## 2025-07-08 PROCEDURE — 11721 DEBRIDE NAIL 6 OR MORE: CPT | Performed by: PODIATRIST

## 2025-07-08 RX ORDER — KETOCONAZOLE 20 MG/G
CREAM TOPICAL DAILY
Qty: 60 G | Refills: 1 | Status: SHIPPED | OUTPATIENT
Start: 2025-07-08 | End: 2025-08-07

## 2025-07-08 NOTE — PROGRESS NOTES
Assessment/Plan: Dermatophytosis of heel bilateral.  Pain upon ambulation.  Necrosis of nail.  Pain upon ambulation.  Mild peripheral artery disease.     Plan.  Chart reviewed.  PCP notes reviewed.  Patient examined.  She has dermatophytosis.  This was debrided today, bilateral heel.  Patient will be placed on ketoconazole cream.  Written instruction given.  Staff advised on this.  Aftercare instruction given.  Return for follow-up.           Assessment  Diagnoses and all orders for this visit:     Dermatophytosis     Pain in both feet              Subjective: Patient has pain in her heels.  She recently has been on antibiotics.  She has had a leg infection as well.  She presents for evaluation.           Allergies   Allergen Reactions    Lactose Intolerance (Gi) - Food Allergy GI Intolerance    Nabumetone Other (See Comments)       Unknown reaction    Penicillins Other (See Comments)       Unknown reacion    Erythromycin Other (See Comments)       Unknown reaction         [Current Medications]    [Current Medications]     Current Outpatient Medications:     calcium carbonate (OS-YESICA) 600 MG tablet, TAKE ONE TABLET BY MOUTH @ 8AM AND @ 8PM, Disp: 180 tablet, Rfl: 3    clindamycin (CLEOCIN) 300 MG capsule, daily as needed Prior to dental work s/p knee replcaement, Disp: , Rfl:     famotidine (PEPCID) 20 mg tablet, 20 mg in the morning and 20 mg in the evening., Disp: , Rfl:     folic acid (FOLVITE) 1 mg tablet, Take by mouth in the morning., Disp: , Rfl:     furosemide (LASIX) 20 mg tablet, TAKE 1 TABLET BY MOUTH DAILY @ 8AM .DR. SAMANIEGO, Disp: 30 tablet, Rfl: 5    Gentle Laxative 5 MG EC tablet, TAKE 2 TABLETS (10 MG TOTAL) BY MOUTH ONCE FOR 1 DOSE PER OFFICE INSTRUCTIONS, Disp: 2 tablet, Rfl: 0    hydroxychloroquine (PLAQUENIL) 200 mg tablet, Take 200 mg by mouth in the morning and 200 mg in the evening. Take with meals., Disp: , Rfl:     lactase (LACTAID) 3,000 units tablet, Take 2 tablets (6,000 Units total) by  mouth see administration instructions Take 2 tabs with first bite of dairy as needed., Disp: 60 tablet, Rfl: 3    levETIRAcetam (KEPPRA) 500 mg tablet, 500 mg every 12 (twelve) hours, Disp: , Rfl:     loratadine (CLARITIN) 10 mg tablet, TAKE 1 TABLET BY MOUTH @ 8PM .DR. CHANEY, Disp: 30 tablet, Rfl: 5    metFORMIN (FORTAMET) 500 MG (OSM) 24 hr tablet, 500 mg in the morning and 500 mg in the evening. Take with meals., Disp: , Rfl:     Multiple Vitamin (TAB-A-ANNELIESE) TABS, TAKE 1 TABLET BY MOUTH DAILY @ 8AM .DR. ETIENNE, Disp: 30 tablet, Rfl: 5    Multiple Vitamins-Minerals (MULTIVITAL PO), Take by mouth, Disp: , Rfl:     PATADAY 0.2 % opth drops, , Disp: , Rfl:     PHENobarbital 64.8 mg tablet, Take 64.8 mg by mouth in the morning and 64.8 mg in the evening., Disp: , Rfl:     polyethylene glycol (GLYCOLAX) 17 GM/SCOOP powder, TAKE 238 GRAMS BY MOUTH ONCE FOR 1 DOSE FOR BOWEL PREP, Disp: 238 g, Rfl: 8    Pyridoxine HCl (VITAMIN B-6) 50 MG TABS, Take by mouth, Disp: , Rfl:     Vitamin D, Cholecalciferol, 400 units TABS, Take 2 tablets (800 Units total) by mouth every morning Take 2 tab daily at 8AM, Disp: 60 tablet, Rfl: 3     [Problem List]    [Problem List]  Patient Active Problem List      Diagnosis    Atherosclerosis of arteries of extremities (HCC)    Callus    Deformity of ankle and foot, acquired    Hammer toes of both feet    Pain in both feet    Systemic lupus erythematosus (HCC)           Subjective  Patient ID: Talia Jauregui is a 74 y.o. female.     HPI     The following portions of the patient's history were reviewed and updated as appropriate:      family history includes Cancer in her brother; Diabetes in her father; Hypertension in her mother.       reports that she has never smoked. She has never used smokeless tobacco. She reports that she does not drink alcohol and does not use drugs.      Objective:  Patient's shoes and socks removed.  Objective  Foot ExamPhysical Exam  Vitals and nursing note  reviewed.      Cardiovascular:      Rate and Rhythm: Normal rate and regular rhythm.      Skin:     Capillary Refill: Capillary refill takes less than 2 seconds.      Comments: Bilateral heel demonstrates fissures consistent with dermatophytosis.  Negative cellulitis of foot.      Neurological:      Mental Status: She is alert.

## 2025-07-08 NOTE — PATIENT INSTRUCTIONS
Patient will be started on ketoconazole cream.  This to be applied to foot bilateral ADA and AP for 30 days.

## 2025-07-10 ENCOUNTER — OFFICE VISIT (OUTPATIENT)
Dept: AUDIOLOGY | Facility: CLINIC | Age: 75
End: 2025-07-10

## 2025-07-10 DIAGNOSIS — H90.3 SENSORY HEARING LOSS, BILATERAL: Primary | ICD-10-CM

## 2025-07-10 NOTE — PROGRESS NOTES
"Hearing Aid Visit:    Name:  Talia Jauregui  :  1950  Age:  74 y.o.  MRN:  [id number]  Date of Evaluation: 07/10/25     HISTORY:    Talia Jauregui was seen today (7/10/2025) for a(n) in-warranty hearing aid check of her bilateral hearing aids. Today, Talia was accompanied by her caregiver, Naty.  Talia reported that the \"right ear will go out before the left\".      Most recent Audiogram: 2024    DEVICE INFORMATION:      Left Device Right Device   Hearing Aid Make: Oticon  Oticon    Hearing Aid Model: Zircon 1 mini R  Zircon 1 mini R    Serial Number: [id number]   Repair Warranty Date: 2028   Loss/Damage Warranty Status: Active  Active        Length/Output # 2 85 w/lock  # 2 85 w/lock    Wax System: Pro Wax miniFIT Pro Wax miniFIT   Dome Size/Style: 6 mm DB  6 mm DB    Battery: Lithium-ion Rechargeable Lithium-ion Rechargeable       Serial Number: desk :  [id number]   Warranty Date: 2028    Accessories: N/A     ACTION/ADJUSTMENTS:  Mild cerumen was removed from both canals via curette w/o incident   RIGHT wax guard was full / instructed Naty on changing them (once a month) and the domes when needed (not able to be cleaned well)    No setting changes needed today / aids were placed well today (upon observation)    RECOMMENDATIONS:   RTO scheduled for 2025     Serena Swann CCC-A  Clinical Audiologist  [id number]  [facility] AUDIOLOGY  [address] [id number]  "

## 2025-07-15 ENCOUNTER — HOSPITAL ENCOUNTER (OUTPATIENT)
Dept: NON INVASIVE DIAGNOSTICS | Facility: HOSPITAL | Age: 75
Discharge: HOME | End: 2025-07-15
Attending: INTERNAL MEDICINE
Payer: MEDICARE

## 2025-07-15 ENCOUNTER — HOSPITAL ENCOUNTER (OUTPATIENT)
Dept: RADIOLOGY | Facility: HOSPITAL | Age: 75
Discharge: HOME | End: 2025-07-15
Attending: INTERNAL MEDICINE
Payer: MEDICARE

## 2025-07-15 ENCOUNTER — RESULTS FOLLOW-UP (OUTPATIENT)
Dept: CARDIOLOGY CLINIC | Facility: CLINIC | Age: 75
End: 2025-07-15

## 2025-07-15 VITALS
HEART RATE: 83 BPM | HEIGHT: 63 IN | DIASTOLIC BLOOD PRESSURE: 79 MMHG | BODY MASS INDEX: 27.29 KG/M2 | WEIGHT: 154 LBS | SYSTOLIC BLOOD PRESSURE: 131 MMHG

## 2025-07-15 VITALS
RESPIRATION RATE: 20 BRPM | HEART RATE: 70 BPM | SYSTOLIC BLOOD PRESSURE: 130 MMHG | DIASTOLIC BLOOD PRESSURE: 73 MMHG | OXYGEN SATURATION: 100 %

## 2025-07-15 DIAGNOSIS — R00.2 PALPITATIONS: ICD-10-CM

## 2025-07-15 DIAGNOSIS — I42.0 DILATED CARDIOMYOPATHY (HCC): Primary | ICD-10-CM

## 2025-07-15 DIAGNOSIS — I70.209 ATHEROSCLEROSIS OF ARTERIES OF EXTREMITIES (HCC): ICD-10-CM

## 2025-07-15 DIAGNOSIS — R06.02 EXERTIONAL SHORTNESS OF BREATH: ICD-10-CM

## 2025-07-15 DIAGNOSIS — R60.0 BILATERAL LEG EDEMA: ICD-10-CM

## 2025-07-15 LAB
AORTIC ROOT: 4 CM
AV LVOT PEAK GRADIENT: 4 MMHG
AV PEAK GRADIENT: 6 MMHG
CHEST PAIN STATEMENT: NORMAL
DOP CALC LVOT AREA: 3.46 CM2
DOP CALC LVOT DIAMETER: 2.1 CM
E WAVE DECELERATION TIME: 174 MS
E/A RATIO: 1.83
FRACTIONAL SHORTENING: 24 (ref 28–44)
GLOBAL LONGITUIDAL STRAIN: -15 %
INTERVENTRICULAR SEPTUM IN DIASTOLE (PARASTERNAL SHORT AXIS VIEW): 1.2 CM
INTERVENTRICULAR SEPTUM: 1.2 CM (ref 0.6–1.1)
LAAS-AP2: 32.9 CM2
LAAS-AP4: 28.1 CM2
LEFT ATRIUM AREA SYSTOLE SINGLE PLANE A4C: 26.6 CM2
LEFT ATRIUM SIZE: 4 CM
LEFT ATRIUM VOLUME (MOD BIPLANE): 108 ML
LEFT ATRIUM VOLUME INDEX (MOD BIPLANE): 62.4 ML/M2
LEFT INTERNAL DIMENSION IN SYSTOLE: 2.8 CM (ref 2.1–4)
LEFT VENTRICULAR INTERNAL DIMENSION IN DIASTOLE: 3.7 CM (ref 3.5–6)
LEFT VENTRICULAR POSTERIOR WALL IN END DIASTOLE: 1.2 CM
LEFT VENTRICULAR STROKE VOLUME: 31 ML
LVSV (TEICH): 31 ML
MAX DIASTOLIC BP: 73 MMHG
MAX HR PERCENT: 67 %
MAX HR: 98 BPM
MAX PREDICTED HEART RATE: 146 BPM
MV E'TISSUE VEL-LAT: 8 CM/S
MV E'TISSUE VEL-SEP: 7 CM/S
MV PEAK A VEL: 0.42 M/S
MV PEAK E VEL: 77 CM/S
MV STENOSIS PRESSURE HALF TIME: 51 MS
MV VALVE AREA P 1/2 METHOD: 4.31
PROTOCOL NAME: NORMAL
PV PEAK GRADIENT: 2 MMHG
RA PRESSURE ESTIMATED: 8 MMHG
RATE PRESSURE PRODUCT: 9240
REASON FOR TERMINATION: NORMAL
RIGHT ATRIUM AREA SYSTOLE A4C: 19.6 CM2
RIGHT VENTRICLE ID DIMENSION: 3.5 CM
RV PSP: 75 MMHG
SL CV LEFT ATRIUM LENGTH A2C: 7.2 CM
SL CV LV EF: 48
SL CV PED ECHO LEFT VENTRICLE DIASTOLIC VOLUME (MOD BIPLANE) 2D: 60 ML
SL CV PED ECHO LEFT VENTRICLE SYSTOLIC VOLUME (MOD BIPLANE) 2D: 29 ML
SL CV REST NUCLEAR ISOTOPE DOSE: 10.5 MCI
SL CV STRESS NUCLEAR ISOTOPE DOSE: 31.8 MCI
SL CV STRESS RECOVERY BP: NORMAL MMHG
SL CV STRESS RECOVERY HR: 86 BPM
SL CV STRESS RECOVERY O2 SAT: 97 %
SPECT HRT GATED+EF W RNC IV: 62 %
STRESS ANGINA INDEX: 0
STRESS BASELINE BP: NORMAL MMHG
STRESS BASELINE HR: 70 BPM
STRESS O2 SAT REST: 100 %
STRESS PEAK HR: 88 BPM
STRESS POST ESTIMATED WORKLOAD: 1 METS
STRESS POST EXERCISE DUR MIN: 1 MIN
STRESS POST EXERCISE DUR MIN: 1 MIN
STRESS POST EXERCISE DUR SEC: 1 SEC
STRESS POST O2 SAT PEAK: 95 %
STRESS POST PEAK BP: 105 MMHG
STRESS POST PEAK HR: 98 BPM
STRESS POST PEAK SYSTOLIC BP: 130 MMHG
STRESS/REST PERFUSION RATIO: 1.03
TARGET HR FORMULA: NORMAL
TEST INDICATION: NORMAL
TR MAX PG: 67 MMHG
TR PEAK VELOCITY: 4.1 M/S
TRICUSPID ANNULAR PLANE SYSTOLIC EXCURSION: 2.1 CM
TRICUSPID VALVE PEAK REGURGITATION VELOCITY: 4.97 M/S

## 2025-07-15 PROCEDURE — 93017 CV STRESS TEST TRACING ONLY: CPT

## 2025-07-15 PROCEDURE — 78452 HT MUSCLE IMAGE SPECT MULT: CPT

## 2025-07-15 PROCEDURE — 93306 TTE W/DOPPLER COMPLETE: CPT | Performed by: INTERNAL MEDICINE

## 2025-07-15 PROCEDURE — 93306 TTE W/DOPPLER COMPLETE: CPT

## 2025-07-15 PROCEDURE — 93016 CV STRESS TEST SUPVJ ONLY: CPT | Performed by: INTERNAL MEDICINE

## 2025-07-15 PROCEDURE — A9502 TC99M TETROFOSMIN: HCPCS

## 2025-07-15 PROCEDURE — 93018 CV STRESS TEST I&R ONLY: CPT | Performed by: INTERNAL MEDICINE

## 2025-07-15 PROCEDURE — 78452 HT MUSCLE IMAGE SPECT MULT: CPT | Performed by: INTERNAL MEDICINE

## 2025-07-15 RX ORDER — REGADENOSON 0.08 MG/ML
0.4 INJECTION, SOLUTION INTRAVENOUS ONCE
Status: COMPLETED | OUTPATIENT
Start: 2025-07-15 | End: 2025-07-15

## 2025-07-15 RX ADMIN — REGADENOSON 0.4 MG: 0.08 INJECTION, SOLUTION INTRAVENOUS at 09:53

## 2025-07-20 ENCOUNTER — HOSPITAL ENCOUNTER (EMERGENCY)
Facility: HOSPITAL | Age: 75
Discharge: LEFT BEFORE DISCHARGE | End: 2025-07-20
Attending: EMERGENCY MEDICINE | Admitting: EMERGENCY MEDICINE
Payer: MEDICARE

## 2025-07-20 ENCOUNTER — APPOINTMENT (EMERGENCY)
Dept: RADIOLOGY | Facility: HOSPITAL | Age: 75
End: 2025-07-20
Payer: MEDICARE

## 2025-07-20 VITALS
OXYGEN SATURATION: 93 % | HEART RATE: 85 BPM | SYSTOLIC BLOOD PRESSURE: 134 MMHG | DIASTOLIC BLOOD PRESSURE: 81 MMHG | TEMPERATURE: 97.8 F | RESPIRATION RATE: 18 BRPM

## 2025-07-20 DIAGNOSIS — S09.90XA INJURY OF HEAD, INITIAL ENCOUNTER: Primary | ICD-10-CM

## 2025-07-20 PROCEDURE — 99283 EMERGENCY DEPT VISIT LOW MDM: CPT

## 2025-07-20 PROCEDURE — 99284 EMERGENCY DEPT VISIT MOD MDM: CPT | Performed by: EMERGENCY MEDICINE

## 2025-07-20 PROCEDURE — 70450 CT HEAD/BRAIN W/O DYE: CPT

## 2025-07-20 NOTE — DISCHARGE INSTRUCTIONS
"Please understand the risk of signing out against medical advice for head injury risk includes death  Patient Education     Head injury in adults   The Basics   Written by the doctors and editors at [facility]   What causes head injuries? -- A head injury can happen when a person hits their head on a hard surface or is hit in the head with something. The most common causes of head injury are falls, sports injuries, and car and bike accidents.  Some head injuries are minor, like a bump on the head. With minor head injuries, the skull protects the brain from damage. Others can be more serious and cause brain injury. A \"concussion\" is the medical term for a mild brain injury. Sometimes, head injuries can be serious or life-threatening.  A more serious head injury can cause:   Broken bone of the skull or face (figure 1)   Brain injury or swelling   Bleeding in or around the brain  This article discusses head injuries in people 18 years and older. Head injuries in children might be managed differently.  What are the symptoms of a head injury? -- Symptoms depend on the type of injury a person has and how severe it is. People with a minor head injury, such as a bump on the head, might not have any symptoms.  Some people pass out or lose consciousness when they get a head injury. If a person does not wake up quickly, or blacks out several minutes or hours after a head injury, they might have bleeding in the brain. The person needs emergency help.  Other symptoms that can happen after a head injury include:   Headache   Nausea or vomiting   Swelling, bleeding, or bruising on the scalp   Dizziness   Confusion or memory problems   Vision problems   Feeling tired or sleepy   Mood or behavior changes, or not acting like yourself   Trouble walking or talking   Seizures - Seizures are waves of abnormal electrical activity in the brain. They can make you pass out, or move or behave strangely.  A head injury that involves a broken " skull or face bone can also cause:   Bruising around the eyes or behind the ear   Blood or clear fluid draining from the nose or ear  Symptoms can start right after a head injury, or a few hours or days later.  Some people have symptoms that last a short time only. Other people have symptoms that cause long-lasting problems.  Will I need tests? -- It depends on your injury and symptoms. Your doctor or nurse will ask about your symptoms and do an exam. They will also ask questions to find out if you are able to think clearly.  If your doctor or nurse thinks that you might have a serious injury, they might order an imaging test of your brain. This might be a CT or MRI scan. These tests create pictures of your skull and brain.  How are head injuries treated? -- Treatment depends on your injury and how serious it is.  Usually, minor head injuries do not need treatment. But your doctor might recommend things like:   Having someone stay with you for 24 hours after your injury - This person should watch for new symptoms or the symptoms listed above. They should also make sure that you can wake up at a normal time after you fall asleep. It is not usually necessary to wake you up during the night.   Taking over-the-counter pain medicines - Acetaminophen (sample brand name: Tylenol) might help relieve a headache.   Rest - It can be important to rest if you have symptoms after a concussion. This means resting your body and avoiding activities that make you feel worse. It can also help to rest your brain. Avoid looking at screens until you are feeling better.   Ice - If you bumped your head, ice can help with pain and swelling. Apply a cold gel pack, bag of ice, or bag of frozen vegetables on the area every 1 to 2 hours, for 15 minutes each time. Put a thin towel between the ice (or other cold object) and your head. Use the ice (or other cold object) for at least 6 hours after your injury.  Severe head injuries need to be  treated in the hospital. In this case, treatment can include:   Medicines - Different medicines can help prevent brain swelling, bleeding in the brain, or seizures.   Surgery - If you have bleeding in or around your brain, or if your brain swells, you might need surgery.  When should I call for help? -- If you had a head injury, there are certain problems that you or your caregiver should watch for.  Someone should call for an ambulance (in the [place] and [place], call 9-1-1) if you:   Cannot be fully woken up   Are acting confused or disoriented   Have a sudden and persistent change in your behavior   Cannot walk normally   Have trouble speaking or slurred speech   Have severe weakness or cannot move an arm, leg, or 1 side of your face   Have a seizure, or jerking of your arms or legs you cannot control  Call the doctor or nurse for advice if you:   Have trouble concentrating, thinking clearly, or remembering things   Have trouble waking from sleep or staying awake   Have nausea or vomiting that is not improving   Have blurry eyesight, double vision, or other problems seeing   Have blood or clear liquid draining from your ears or nose   Feel dizzy or faint   Seem weak or have numbness in an arm, leg, or other body part   Have a stiff neck   Have a headache that is severe, gets worse, feels different, or does not get better with over-the-counter medicines  If any of the above symptoms seem severe, or if you are concerned but cannot reach the doctor or nurse, seek emergency help. These things don't always mean there is a serious problem, but seeing a doctor or nurse is the only way to know for sure.  When can I play sports or do my usual activities again? -- Ask your doctor when you can play sports or do your usual activities again. It depends on your injury and symptoms.  How can head injuries be prevented? -- To help prevent another head injury, you should wear a helmet when you ride a bike or motorcycle, or when you  play sports where you could hit your head. You should also wear a seat belt every time you drive or ride in a car.  All topics are updated as new evidence becomes available and our peer review process is complete.  This topic retrieved from [employer] on: Mar 06, 2024.  Topic 38435 Version 13.0  Release: 32.2.4 - C32.64  © 2024 UpToDate, Inc. and/or its affiliates. All rights reserved.  figure 1: Bones of the skull and face     Graphic 24022 Version 2.0  Consumer Information Use and Disclaimer   Disclaimer: This generalized information is a limited summary of diagnosis, treatment, and/or medication information. It is not meant to be comprehensive and should be used as a tool to help the user understand and/or assess potential diagnostic and treatment options. It does NOT include all information about conditions, treatments, medications, side effects, or risks that may apply to a specific patient. It is not intended to be medical advice or a substitute for the medical advice, diagnosis, or treatment of a health care provider based on the health care provider's examination and assessment of a patient's specific and unique circumstances. Patients must speak with a health care provider for complete information about their health, medical questions, and treatment options, including any risks or benefits regarding use of medications. This information does not endorse any treatments or medications as safe, effective, or approved for treating a specific patient. Up[employer]Date, Inc. and its affiliates disclaim any warranty or liability relating to this information or the use thereof.The use of this information is governed by the Terms of Use, available at https://www.wolterskluwer.com/en/know/clinical-effectiveness-terms. 2024© UpToDate, Inc. and its affiliates and/or licensors. All rights reserved.  Copyright   © 2024 [employer]Date, Inc. and/or its affiliates. All rights reserved.

## 2025-07-20 NOTE — ED PROVIDER NOTES
Time reflects when diagnosis was documented in both MDM as applicable and the Disposition within this note       Time User Action Codes Description Comment    7/20/2025 12:18 PM Lanie Ragsdale [S09.90XA] Injury of head, initial encounter           ED Disposition       ED Disposition   AMA    Condition   --    Date/Time   Sun Jul 20, 2025 12:18 PM    Comment   Date: 7/20/2025  Patient: Talia Jauregui  Admitted: 7/20/2025 10:16 AM  Attending Provider: Lanie Ragsdale DO    Talia Jauregui or her authorized caregiver has made the decision for the patient to leave the emergency department against the advice of shelton ragsdale She or her authorized caregiver has been informed and understands the inherent risks, including death, stable She or her authorized caregiver has decided to accept the responsibility for this decision. Talia Jauregui and all necessary parties  have been advised that she may return for further evaluation or treatment. Her condition at time of discharge was stable Talia Jauregui had current vital signs as follows:  /81 (BP Location: Left arm)   Pulse 85   Temp 97.8 °F (36.6 °C) (Ora l)   Resp 18                Assessment & Plan       Medical Decision Making  Patient evaluated with labs  imaging.  Patient did not want to wait for the CT read so she signed out AGAINST MEDICAL ADVICE    I reviewed her CT once it was read and was unremarkable and negative for any acute injury    Problems Addressed:  Injury of head, initial encounter: acute illness or injury    Amount and/or Complexity of Data Reviewed  External Data Reviewed: notes.  Radiology: ordered. Decision-making details documented in ED Course.             Medications - No data to display    ED Risk Strat Scores                    (ISAR) Identification of Seniors at Risk  Before the illness or injury that brought you to the Emergency, did you need someone to help you on a regular basis?: 0  In the last 24 hours, have you needed more help than  usual?: 0  Have you been hospitalized for one or more nights during the past 6 months?: 0  In general, do you see well?: 1  In general, do you have serious problems with your memory?: 0  Do you take more than three different medications every day?: 0  ISAR Score: 1            SBIRT 20yo+      Flowsheet Row Most Recent Value   Initial Alcohol Screen: US AUDIT-C     1. How often do you have a drink containing alcohol? 0 Filed at: 07/20/2025 1025   2. How many drinks containing alcohol do you have on a typical day you are drinking?  0 Filed at: 07/20/2025 1025   3a. Male UNDER 65: How often do you have five or more drinks on one occasion? 0 Filed at: 07/20/2025 1025   3b. FEMALE Any Age, or MALE 65+: How often do you have 4 or more drinks on one occassion? 0 Filed at: 07/20/2025 1025   Audit-C Score 0 Filed at: 07/20/2025 1025   [place]: How many times in the past year have you...    Used an illegal drug or used a prescription medication for non-medical reasons? Never Filed at: 07/20/2025 1025                            History of Present Illness       Chief Complaint   Patient presents with    Head Injury     Pt reports opening fridge and hitting head, no LOC/blood thinners. No Lac noted, pt asymptomatic at time of triage.        Past Medical History[1]   Past Surgical History[2]   Family History[3]   Social History[4]   E-Cigarette/Vaping      E-Cigarette/Vaping Substances      I have reviewed and agree with the history as documented.     74-year-old female presents with head trauma where she hit her head on a fridge this morning no loss of consciousness headache no nausea vomiting no confusion no dizziness lightheadedness no symptoms not on blood thinners.      History provided by:  Patient   used: No        Review of Systems   Constitutional: Negative.    HENT: Negative.     Eyes: Negative.    Respiratory: Negative.     Cardiovascular: Negative.    Gastrointestinal: Negative.    Endocrine:  Negative.    Genitourinary: Negative.    Musculoskeletal: Negative.    Skin: Negative.    Allergic/Immunologic: Negative.    Neurological: Negative.    Hematological: Negative.    Psychiatric/Behavioral: Negative.     All other systems reviewed and are negative.          Objective       ED Triage Vitals [07/20/25 1021]   Temperature Pulse Blood Pressure Respirations SpO2 Patient Position - Orthostatic VS   97.8 °F (36.6 °C) 85 134/81 18 93 % Sitting      Temp Source Heart Rate Source BP Location FiO2 (%) Pain Score    Oral Monitor Left arm -- --      Vitals      Date and Time Temp Pulse SpO2 Resp BP Pain Score FACES Pain Rating User   07/20/25 1021 97.8 °F (36.6 °C) 85 93 % 18 134/81 -- -- VG            Physical Exam  Vitals and nursing note reviewed.   Constitutional:       Appearance: Normal appearance.   HENT:      Head: Normocephalic and atraumatic.      Nose: Nose normal.      Mouth/Throat:      Mouth: Mucous membranes are moist.     Eyes:      Extraocular Movements: Extraocular movements intact.      Pupils: Pupils are equal, round, and reactive to light.       Cardiovascular:      Rate and Rhythm: Normal rate and regular rhythm.   Pulmonary:      Effort: Pulmonary effort is normal.      Breath sounds: Normal breath sounds.   Abdominal:      General: Abdomen is flat. Bowel sounds are normal.      Palpations: Abdomen is soft.     Musculoskeletal:         General: Normal range of motion.      Cervical back: Normal range of motion and neck supple.     Skin:     General: Skin is warm.      Capillary Refill: Capillary refill takes less than 2 seconds.     Neurological:      General: No focal deficit present.      Mental Status: She is alert and oriented to person, place, and time. Mental status is at baseline.      Cranial Nerves: No cranial nerve deficit.      Sensory: No sensory deficit.      Motor: No weakness.      Coordination: Coordination normal.      Gait: Gait normal.      Deep Tendon Reflexes: Reflexes  normal.     Psychiatric:         Mood and Affect: Mood normal.         Thought Content: Thought content normal.         Results Reviewed       None            CT head without contrast   Final Interpretation by Jimmie Bartlett MD ( [id number])      No acute intracranial abnormality.                  Workstation performed: [id number]             Procedures    ED Medication and Procedure Management   Prior to Admission Medications   Prescriptions Last Dose Informant Patient Reported? Taking?   Empagliflozin (Jardiance) 10 MG TABS tablet   No No   Sig: Take 1 tablet (10 mg total) by mouth every morning   Gentle Laxative 5 MG EC tablet  Care Giver No No   Sig: TAKE 2 TABLETS (10 MG TOTAL) BY MOUTH ONCE FOR 1 DOSE PER OFFICE INSTRUCTIONS   Multiple Vitamin (TAB-A-ANNELIESE) TABS  Care Giver No No   Sig: TAKE 1 TABLET BY MOUTH DAILY @ 8AM .DR. ETIENNE   Multiple Vitamins-Minerals (MULTIVITAL PO)  Care Giver Yes No   Sig: Take by mouth   PATADAY 0.2 % opth drops  Care Giver Yes No   PHENobarbital 64.8 mg tablet  Care Giver Yes No   Sig: Take 64.8 mg by mouth in the morning and 64.8 mg in the evening.   Pyridoxine HCl (VITAMIN B-6) 50 MG TABS  Care Giver Yes No   Sig: Take by mouth   Vitamin D, Cholecalciferol, 400 units TABS  Care Giver No No   Sig: Take 2 tablets (800 Units total) by mouth every morning Take 2 tab daily at 8AM   albuterol (PROVENTIL HFA,VENTOLIN HFA) 90 mcg/act inhaler   Yes No   calcium carbonate (OS-YESICA) 600 MG tablet  Care Giver No No   Sig: TAKE ONE TABLET BY MOUTH @ 8AM AND @ 8PM   clindamycin (CLEOCIN) 300 MG capsule  Care Giver Yes No   Sig: daily as needed Prior to dental work s/p knee replcaement   famotidine (PEPCID) 20 mg tablet  Care Giver Yes No   Si mg in the morning and 20 mg in the evening.   folic acid (FOLVITE) 1 mg tablet  Care Giver Yes No   Sig: Take by mouth in the morning.   furosemide (LASIX) 20 mg tablet  Care Giver No No   Sig: TAKE 1 TABLET BY MOUTH DAILY @ 8AM .DR. SAMANIEGO    hydroxychloroquine (PLAQUENIL) 200 mg tablet  Care Giver Yes No   Sig: Take 200 mg by mouth in the morning and 200 mg in the evening. Take with meals.   ketoconazole (NIZORAL) 2 % cream   No No   Sig: Apply topically daily   lactase (LACTAID) 3,000 units tablet  Care Giver No No   Sig: Take 2 tablets (6,000 Units total) by mouth see administration instructions Take 2 tabs with first bite of dairy as needed.   levETIRAcetam (KEPPRA) 500 mg tablet  Care Giver Yes No   Si mg every 12 (twelve) hours   loratadine (CLARITIN) 10 mg tablet  Care Giver No No   Sig: TAKE 1 TABLET BY MOUTH @ 8PM .DR. CHANEY   metFORMIN (FORTAMET) 500 MG (OSM) 24 hr tablet  Care Giver Yes No   Si mg in the morning and 500 mg in the evening. Take with meals.   polyethylene glycol (GLYCOLAX) 17 GM/SCOOP powder  Care Giver No No   Sig: TAKE 238 GRAMS BY MOUTH ONCE FOR 1 DOSE FOR BOWEL PREP      Facility-Administered Medications: None     Discharge Medication List as of 2025 12:19 PM        CONTINUE these medications which have NOT CHANGED    Details   albuterol (PROVENTIL HFA,VENTOLIN HFA) 90 mcg/act inhaler Historical Med      calcium carbonate (OS-YESICA) 600 MG tablet TAKE ONE TABLET BY MOUTH @ 8AM AND @ 8PM, Normal      clindamycin (CLEOCIN) 300 MG capsule daily as needed Prior to dental work s/p knee replcaement, Starting Fri 10/1/2021, Historical Med      Empagliflozin (Jardiance) 10 MG TABS tablet Take 1 tablet (10 mg total) by mouth every morning, Starting Tue 7/15/2025, Normal      famotidine (PEPCID) 20 mg tablet 20 mg in the morning and 20 mg in the evening., Starting 2020, Historical Med      folic acid (FOLVITE) 1 mg tablet Take by mouth in the morning., Historical Med      furosemide (LASIX) 20 mg tablet TAKE 1 TABLET BY MOUTH DAILY @ 8AM .DR. SAMANIEGO, Normal      Gentle Laxative 5 MG EC tablet TAKE 2 TABLETS (10 MG TOTAL) BY MOUTH ONCE FOR 1 DOSE PER OFFICE INSTRUCTIONS, Normal      hydroxychloroquine  (PLAQUENIL) 200 mg tablet Take 200 mg by mouth in the morning and 200 mg in the evening. Take with meals., Historical Med      ketoconazole (NIZORAL) 2 % cream Apply topically daily, Starting Tue 7/8/2025, Until Thu 8/7/2025, Normal      lactase (LACTAID) 3,000 units tablet Take 2 tablets (6,000 Units total) by mouth see administration instructions Take 2 tabs with first bite of dairy as needed., Starting Tue 3/27/2018, Normal      levETIRAcetam (KEPPRA) 500 mg tablet 500 mg every 12 (twelve) hours, Starting Tue 3/6/2018, Historical Med      loratadine (CLARITIN) 10 mg tablet TAKE 1 TABLET BY MOUTH @ 8PM .DR. CHANEY, Normal      metFORMIN (FORTAMET) 500 MG (OSM) 24 hr tablet 500 mg in the morning and 500 mg in the evening. Take with meals., Starting Fri 10/8/2021, Historical Med      Multiple Vitamin (TAB-A-ANNELIESE) TABS TAKE 1 TABLET BY MOUTH DAILY @ 8AM .DR. ETIENNE, Normal      Multiple Vitamins-Minerals (MULTIVITAL PO) Take by mouth, Historical Med      PATADAY 0.2 % opth drops Starting Tue 1/30/2018, Historical Med      PHENobarbital 64.8 mg tablet Take 64.8 mg by mouth in the morning and 64.8 mg in the evening., Historical Med      polyethylene glycol (GLYCOLAX) 17 GM/SCOOP powder TAKE 238 GRAMS BY MOUTH ONCE FOR 1 DOSE FOR BOWEL PREP, Normal      Pyridoxine HCl (VITAMIN B-6) 50 MG TABS Take by mouth, Starting Wed 6/24/2009, Historical Med      Vitamin D, Cholecalciferol, 400 units TABS Take 2 tablets (800 Units total) by mouth every morning Take 2 tab daily at 8AM, Starting Tue 3/27/2018, Normal           No discharge procedures on file.  ED SEPSIS DOCUMENTATION   Time reflects when diagnosis was documented in both MDM as applicable and the Disposition within this note       Time User Action Codes Description Comment    7/20/2025 12:18 PM Lanie Ragsdale Add [S09.90XA] Injury of head, initial encounter                      [1]   Past Medical History:  Diagnosis Date    Diabetes mellitus (HCC)     pre DM    Dry eye      related to seasonal allergies    GERD (gastroesophageal reflux disease)     Lupus     Seasonal allergies     Seizures (HCC)     last seizure 2015   [2]   Past Surgical History:  Procedure Laterality Date    COLONOSCOPY      JOINT REPLACEMENT Bilateral     knee   [3]   Family History  Problem Relation Name Age of Onset    Hypertension Mother      Diabetes Father      Cancer Brother     [4]   Social History  Tobacco Use    Smoking status: Never    Smokeless tobacco: Never   Substance Use Topics    Alcohol use: Never    Drug use: Never        Lanie Ragsdale DO  07/21/25 3065

## 2025-07-20 NOTE — Clinical Note
Talia Jauregui was seen and treated in our emergency department on 7/20/2025.                Diagnosis:     Talia  .    She may return on this date: 07/21/2025    May return to day program/work.     If you have any questions or concerns, please don't hesitate to call.      Sharla Holguin RN    ______________________________           _______________          _______________  Hospital Representative                              Date                                Time

## 2025-07-30 ENCOUNTER — TELEPHONE (OUTPATIENT)
Age: 75
End: 2025-07-30

## 2025-08-19 ENCOUNTER — CONSULT (OUTPATIENT)
Dept: PULMONOLOGY | Facility: MEDICAL CENTER | Age: 75
End: 2025-08-19
Payer: MEDICARE

## 2025-08-19 ENCOUNTER — TELEPHONE (OUTPATIENT)
Age: 75
End: 2025-08-19

## 2025-08-19 VITALS
OXYGEN SATURATION: 97 % | HEART RATE: 64 BPM | TEMPERATURE: 97.2 F | WEIGHT: 150.4 LBS | BODY MASS INDEX: 26.64 KG/M2 | SYSTOLIC BLOOD PRESSURE: 120 MMHG | DIASTOLIC BLOOD PRESSURE: 72 MMHG

## 2025-08-19 DIAGNOSIS — I27.20 PULMONARY HYPERTENSION (HCC): Primary | ICD-10-CM

## 2025-08-19 DIAGNOSIS — R06.02 SHORTNESS OF BREATH: ICD-10-CM

## 2025-08-19 DIAGNOSIS — M32.0 DRUG-INDUCED SYSTEMIC LUPUS ERYTHEMATOSUS, UNSPECIFIED ORGAN INVOLVEMENT STATUS (HCC): ICD-10-CM

## 2025-08-19 PROCEDURE — 99204 OFFICE O/P NEW MOD 45 MIN: CPT | Performed by: INTERNAL MEDICINE
